# Patient Record
Sex: MALE | Race: WHITE
[De-identification: names, ages, dates, MRNs, and addresses within clinical notes are randomized per-mention and may not be internally consistent; named-entity substitution may affect disease eponyms.]

---

## 2017-02-28 NOTE — PREOP
DATE OF OPERATION:  Undetermined. 

 

DATE OF DICTATION:  02/16/2017

 

REASON FOR ADMISSION:  Soft tissue mass, right chest. 

 

BRIEF HISTORY:  This is a 51-year-old gentleman who has a painful soft tissue mass

just along the rib cage on the patient's right side.  The patient underwent an

ultrasound of this mass and it was nonspecific; however, a cyst could not be ruled

out.  The mass, on ultrasound, was approximately 7 mm in size.  Due to the pain, the

patient wished to have this removed.  

 

PAST MEDICAL HISTORY:  Significant for peptic ulcer disease, hypertension, coronary

artery disease, hypercholesterolemia, diabetes, history of blood clots and a

pulmonary embolism.  He is on lifelong Coumadin.  He has a history of thyroid issues.

 

 

PAST SURGICAL HISTORY:  Open repair of ventral hernia.  He has an excision of a cyst

in 2012 and knee surgery. 

 

ALLERGIES:  None. 

 

SOCIAL HISTORY:  The patient is a retired .  He does not smoke nor

drink. 

 

MEDICATIONS:  Metformin, atenolol, diltiazem, AcipHex and Coumadin.  

 

PHYSICAL EXAMINATION:

Lungs:  Clear. 

Heart:  Regular rhythm. 

Abdomen:  Soft, nontender, nondistended.  Patient lost approximately 90 pounds.  The

patient has a soft tissue mass along the right rib cage.  It is noted best in the

sitting position and just at the crease of the chest fat and his abdominal fat.  The

mass is approximately 1 cm in size and well demarcated.  It is located between a

chronic skin psoriatic patch posteriorly and a skin tag anteriorly.  

 

IMPRESSION/PLAN:  Soft tissue mass, right chest.  Given the fact that it caused him

discomfort, will plan for excision in the operating room.  

 

The patient, his wife and myself had approximately an hour conversation regarding the

pros and cons of stopping the Coumadin for this operation and doing a Lovenox bridge

versus doing the operation on Coumadin.  I think the risks for this kind of surgery

is higher if we put him on a Lovenox bridge, and therefore, the patient and wife have

opted to proceed with doing the surgery on Coumadin, knowing the effects of

persistent bleeding, possibly requiring transfusion and even reversal with FFP.  They

understand these risks and still want to proceed.  

 

 

BURTON DRIVER M.D.

 

[id number]

DD: 02/16/2017 15:14

DT: 02/16/2017 20:19

Job #:  [id number]

CC:  Davion Solis MD

## 2017-03-17 PROBLEM — Z00.00 ENCOUNTER FOR PREVENTIVE HEALTH EXAMINATION: Status: ACTIVE | Noted: 2017-03-17

## 2017-03-28 PROBLEM — N40.0 BPH (BENIGN PROSTATIC HYPERPLASIA): Status: RESOLVED | Noted: 2017-03-28 | Resolved: 2017-03-28

## 2017-03-29 ENCOUNTER — HOSPITAL ENCOUNTER (OUTPATIENT)
Dept: HOSPITAL 74 - FASU | Age: 52
Discharge: HOME | End: 2017-03-29
Attending: SURGERY
Payer: COMMERCIAL

## 2017-03-29 VITALS — HEART RATE: 71 BPM | TEMPERATURE: 97.9 F | SYSTOLIC BLOOD PRESSURE: 123 MMHG | DIASTOLIC BLOOD PRESSURE: 73 MMHG

## 2017-03-29 VITALS — BODY MASS INDEX: 33.7 KG/M2

## 2017-03-29 DIAGNOSIS — D21.3: Primary | ICD-10-CM

## 2017-03-29 LAB
INR BLD: 1.88 (ref 0.82–1.09)
PT PNL PPP: 20.8 SEC (ref 10.2–13)

## 2017-03-29 PROCEDURE — 0HB5XZX EXCISION OF CHEST SKIN, EXTERNAL APPROACH, DIAGNOSTIC: ICD-10-PCS | Performed by: SURGERY

## 2017-03-29 NOTE — PN
Progress Note (short form)





- Note


Progress Note: 


52 year old male has AF, with bradycardia that seems to be exacerbated post op.

  Pt with occational runs of HR dropping into 20's.  Pt says he feels fine.





Bp stable through out





Rhythm strip captured and 12 lead obtained.





Spoke with Dr Olivares pt cardiologist.


He recommended, holding Digoxin, holding atenolol for the evening and call him 

in the AM





he was ok with patient being discharged home

## 2017-03-30 ENCOUNTER — APPOINTMENT (OUTPATIENT)
Dept: HEMATOLOGY ONCOLOGY | Facility: CLINIC | Age: 52
End: 2017-03-30

## 2017-03-30 VITALS
HEART RATE: 72 BPM | OXYGEN SATURATION: 98 % | HEIGHT: 70 IN | SYSTOLIC BLOOD PRESSURE: 130 MMHG | DIASTOLIC BLOOD PRESSURE: 88 MMHG | WEIGHT: 239 LBS | BODY MASS INDEX: 34.22 KG/M2

## 2017-03-30 DIAGNOSIS — Z87.891 PERSONAL HISTORY OF NICOTINE DEPENDENCE: ICD-10-CM

## 2017-03-30 DIAGNOSIS — N40.0 BENIGN PROSTATIC HYPERPLASIA WITHOUT LOWER URINARY TRACT SYMPMS: ICD-10-CM

## 2017-03-30 DIAGNOSIS — Z83.2 FAMILY HISTORY OF DISEASES OF THE BLOOD AND BLOOD-FORMING ORGANS AND CERTAIN DISORDERS INVOLVING THE IMMUNE MECHANISM: ICD-10-CM

## 2017-03-30 DIAGNOSIS — Z82.71 FAMILY HISTORY OF POLYCYSTIC KIDNEY: ICD-10-CM

## 2017-03-30 RX ORDER — DIGOXIN 250 MCG
0.25 TABLET ORAL DAILY
Refills: 0 | Status: DISCONTINUED | COMMUNITY
End: 2017-03-29

## 2017-03-30 RX ORDER — GLIMEPIRIDE 4 MG/1
4 TABLET ORAL TWICE DAILY
Refills: 0 | Status: DISCONTINUED | COMMUNITY
End: 2017-03-30

## 2017-03-30 NOTE — OP
DATE OF OPERATION:  03/29/2017

 

PREOPERATIVE DIAGNOSIS:  Soft tissue mass right chest, suspect lipoma.

 

POSTOPERATIVE DIAGNOSIS:   Soft tissue mass right chest, suspect lipoma, 
consistent

with lipoma.

 

PROCEDURE:   Excision of right chest soft tissue mass with 4 cm wound closure. 

 

SURGEON:  Hilario Reyna MD

 

ASSISTANT: None. 

 

ANESTHESIA:  Sofi Lockett MD; MAC, 1% lidocaine without epinephrine, 
approximately 5

mL. 

 

ESTIMATED BLOOD LOSS:  Minimal.

 

SPECIMENS:  Soft tissue mass, lipoma. 

 

INDICATIONS FOR PROCEDURE:  This is a gentleman with a soft tissue mass 
overlying the

right chest that is causing him discomfort.  He wished to have this removed. 

 

DESCRIPTION OF PROCEDURE:   Patient identified and appropriately positioned on 
the

operating table.  After IV sedation, the area was prepped and draped in the 
usual

sterile fashion with ChloraPrep.  Then, 1% lidocaine without epinephrine was 
used for

anesthesia, approximately 5 mL.  A 4-cm transverse incision overlying the mass 
was

mass was made, deep to the subcutaneous tissue.  The mass was sharply excised. 

Hemostasis was achieved with electrocautery as needed.  The deep fat and dermis 
were

reapproximated with inverted 3-0 Vicryl suture. The skin was closed with 4-0 
Biosyn

followed by Dermabond.  Length of incision was 4 cm.  At the conclusion of the 
case,

sponge and needle counts were correct.  

 

ATTESTATION:  Brief operative note hand written on the preprinted form.  The 
 was

cleared prior to giving any narcotics.  

 

 

MEGAN CALDWELL [id number]

DD: 03/29/2017 13:01

DT: 03/30/2017 02:03

Job #:  [id number]



cc:  EMERSON Rebolledo

## 2017-03-30 NOTE — EKG
Test Reason : 

Blood Pressure : ***/*** mmHG

Vent. Rate : 052 BPM     Atrial Rate : 055 BPM

   P-R Int : 000 ms          QRS Dur : 088 ms

    QT Int : 432 ms       P-R-T Axes : 000 000 -08 degrees

   QTc Int : 401 ms

 

ATRIAL FIBRILLATION WITH SLOW VENTRICULAR RESPONSE

CANNOT RULE OUT INFERIOR INFARCT , AGE UNDETERMINED

ANTEROSEPTAL INFARCT , AGE UNDETERMINED

NO PREVIOUS ECGS AVAILABLE

Confirmed by MD COY MARJORY ([id number]) on 3/30/2017 4:46:14 PM

 

Referred By: Hilario Reyna           Confirmed By:DRISS COY MD

## 2017-03-31 NOTE — PATH
Surgical Pathology Report



Patient Name:  BOBY JOHNSON

Accession #:  

[facility]. Rec. #:  [id number]                                                        

   /Age/Gender:  1965 (Age: 52) / M

Account:  [id number]                                                          

             Location: [facility] AMB[facility] 

Taken:  3/29/2017

Received:  3/29/2017

Reported:  3/31/2017

Physicians:  Hilario Reyna

  



Specimen(s) Received

 SOFT TISSUE MASS RIGHT CHEST LIPOMA 





Clinical History

Right chest wall mass







Final Diagnosis

SOFT TISSUE, RIGHT CHEST, EXCISION:

LIPOMA.





***Electronically Signed***

Rafa Klein M.D.





Gross Description

Received in formalin labeled "soft tissue mass right chest lipoma," is a 3.5 x

2.7 x 1.0 cm portion of yellow, lobulated adipose tissue. Sectioning reveals

homogeneous yellow, smooth fat. Representative sections are submitted in one

cassette.

/3/30/2017



[facility]/3/30/2017

## 2017-04-07 RX ORDER — WARFARIN 5 MG/1
5 TABLET ORAL
Refills: 0 | Status: DISCONTINUED | COMMUNITY
End: 2017-04-07

## 2017-06-14 ENCOUNTER — OTHER (OUTPATIENT)
Age: 52
End: 2017-06-14

## 2017-06-21 ENCOUNTER — APPOINTMENT (OUTPATIENT)
Dept: HEMATOLOGY ONCOLOGY | Facility: CLINIC | Age: 52
End: 2017-06-21

## 2017-07-18 ENCOUNTER — APPOINTMENT (OUTPATIENT)
Dept: HEMATOLOGY ONCOLOGY | Facility: CLINIC | Age: 52
End: 2017-07-18

## 2017-07-18 VITALS
HEART RATE: 78 BPM | SYSTOLIC BLOOD PRESSURE: 122 MMHG | BODY MASS INDEX: 35.07 KG/M2 | OXYGEN SATURATION: 98 % | HEIGHT: 70 IN | WEIGHT: 245 LBS | RESPIRATION RATE: 16 BRPM | DIASTOLIC BLOOD PRESSURE: 76 MMHG | TEMPERATURE: 98.7 F

## 2017-07-18 RX ORDER — ALLOPURINOL 100 MG/1
100 TABLET ORAL DAILY
Refills: 0 | Status: DISCONTINUED | COMMUNITY
End: 2017-07-18

## 2017-10-05 ENCOUNTER — OTHER (OUTPATIENT)
Age: 52
End: 2017-10-05

## 2018-01-02 ENCOUNTER — RX RENEWAL (OUTPATIENT)
Age: 53
End: 2018-01-02

## 2018-01-03 ENCOUNTER — RX RENEWAL (OUTPATIENT)
Age: 53
End: 2018-01-03

## 2018-01-22 ENCOUNTER — APPOINTMENT (OUTPATIENT)
Dept: HEMATOLOGY ONCOLOGY | Facility: CLINIC | Age: 53
End: 2018-01-22
Payer: MEDICARE

## 2018-01-22 VITALS
WEIGHT: 281 LBS | OXYGEN SATURATION: 97 % | DIASTOLIC BLOOD PRESSURE: 75 MMHG | HEIGHT: 70 IN | SYSTOLIC BLOOD PRESSURE: 117 MMHG | HEART RATE: 82 BPM | TEMPERATURE: 99.2 F | BODY MASS INDEX: 40.23 KG/M2 | RESPIRATION RATE: 16 BRPM

## 2018-01-22 PROCEDURE — 99214 OFFICE O/P EST MOD 30 MIN: CPT

## 2018-02-22 ENCOUNTER — APPOINTMENT (OUTPATIENT)
Dept: HEMATOLOGY ONCOLOGY | Facility: CLINIC | Age: 53
End: 2018-02-22

## 2018-03-08 ENCOUNTER — APPOINTMENT (OUTPATIENT)
Dept: HEMATOLOGY ONCOLOGY | Facility: CLINIC | Age: 53
End: 2018-03-08

## 2018-06-04 ENCOUNTER — OTHER (OUTPATIENT)
Age: 53
End: 2018-06-04

## 2018-06-11 ENCOUNTER — APPOINTMENT (OUTPATIENT)
Dept: HEMATOLOGY ONCOLOGY | Facility: CLINIC | Age: 53
End: 2018-06-11

## 2018-06-15 ENCOUNTER — OTHER (OUTPATIENT)
Age: 53
End: 2018-06-15

## 2018-07-17 ENCOUNTER — APPOINTMENT (OUTPATIENT)
Dept: HEMATOLOGY ONCOLOGY | Facility: CLINIC | Age: 53
End: 2018-07-17

## 2018-12-24 ENCOUNTER — RECORD ABSTRACTING (OUTPATIENT)
Age: 53
End: 2018-12-24

## 2018-12-24 DIAGNOSIS — Z87.19 PERSONAL HISTORY OF OTHER DISEASES OF THE DIGESTIVE SYSTEM: ICD-10-CM

## 2018-12-24 DIAGNOSIS — Z86.010 PERSONAL HISTORY OF COLONIC POLYPS: ICD-10-CM

## 2018-12-24 DIAGNOSIS — Z82.49 FAMILY HISTORY OF ISCHEMIC HEART DISEASE AND OTHER DISEASES OF THE CIRCULATORY SYSTEM: ICD-10-CM

## 2018-12-24 DIAGNOSIS — Z84.1 FAMILY HISTORY OF DISORDERS OF KIDNEY AND URETER: ICD-10-CM

## 2019-01-10 ENCOUNTER — RX RENEWAL (OUTPATIENT)
Age: 54
End: 2019-01-10

## 2019-01-23 ENCOUNTER — RX RENEWAL (OUTPATIENT)
Age: 54
End: 2019-01-23

## 2019-02-04 ENCOUNTER — RX RENEWAL (OUTPATIENT)
Age: 54
End: 2019-02-04

## 2019-02-13 ENCOUNTER — RESULT REVIEW (OUTPATIENT)
Age: 54
End: 2019-02-13

## 2019-03-12 ENCOUNTER — APPOINTMENT (OUTPATIENT)
Dept: ENDOCRINOLOGY | Facility: CLINIC | Age: 54
End: 2019-03-12

## 2019-03-13 ENCOUNTER — RX RENEWAL (OUTPATIENT)
Age: 54
End: 2019-03-13

## 2019-03-18 ENCOUNTER — RX RENEWAL (OUTPATIENT)
Age: 54
End: 2019-03-18

## 2019-03-18 RX ORDER — ENOXAPARIN SODIUM 150 MG/ML
150 INJECTION SUBCUTANEOUS DAILY
Qty: 3 | Refills: 0 | Status: DISCONTINUED | COMMUNITY
Start: 2017-08-15 | End: 2019-03-18

## 2019-03-18 RX ORDER — ENOXAPARIN SODIUM 100 MG/ML
40 INJECTION SUBCUTANEOUS DAILY
Qty: 2 | Refills: 0 | Status: DISCONTINUED | COMMUNITY
Start: 2017-08-15 | End: 2019-03-18

## 2019-03-19 ENCOUNTER — APPOINTMENT (OUTPATIENT)
Dept: HEMATOLOGY ONCOLOGY | Facility: CLINIC | Age: 54
End: 2019-03-19
Payer: MEDICARE

## 2019-03-19 ENCOUNTER — OTHER (OUTPATIENT)
Age: 54
End: 2019-03-19

## 2019-03-19 VITALS
OXYGEN SATURATION: 97 % | WEIGHT: 315 LBS | BODY MASS INDEX: 45.1 KG/M2 | DIASTOLIC BLOOD PRESSURE: 94 MMHG | RESPIRATION RATE: 20 BRPM | SYSTOLIC BLOOD PRESSURE: 146 MMHG | TEMPERATURE: 98.7 F | HEART RATE: 91 BPM | HEIGHT: 70 IN

## 2019-03-19 PROCEDURE — 99214 OFFICE O/P EST MOD 30 MIN: CPT

## 2019-03-19 NOTE — HISTORY OF PRESENT ILLNESS
[de-identified] : Patient is a 52 year old male being seen for history of PE. He was diagnosed with PE 1/2010,  placed on enoxaparin transitioned to warfarin. PE felt to be unprovoked and hypercoag work up was negative for prothrombin and factor V mutation and antiphospholipid syndrome.  Ferritin level was also low.  Mr Blackwood was morbidly obese in 2010 weighing 350lbs.  He has lost over 100lbs in last 6 months. He has a history of HTN, atrial fibrillation and diabetes.  He has since been able to come off several medications due to weight loss.  He continues on warfarin with no bleeding or bruising and his INR is stable.  [de-identified] : He is s/p inguinal hernia repair about two weeks ago which was done laparoscopically.. His surgery was delayed because of bout with sciatica causing sever back pain. He is scheduled for ventral hernia in September. he used Lovenox as a bridge and restarted Pradaxa.  He denies bleeding, bruising, SOB or leg discomfort.

## 2019-03-19 NOTE — ASSESSMENT
[FreeTextEntry1] : Patient with h/o unprovoked pulmonary embolism 2010 followed by chronic atrial fibrillation 2011 on chronic anticoagulation. \par Patient changed 2017 to dabigatran from warfarin - tolerates well. \par \par Plan for hernia repair - May 2019\par S/p 3 falls, mechanical - PT ordered for gait training \par Continue dabigatran \par Repeat iron parameters - previously reported ferritin 17. \par GI work up - colonoscopy - 2013, EGD 6/16\par \par D/w patient - gained 80 lbs since 2017\par \par

## 2019-03-19 NOTE — PHYSICAL EXAM
[Fully active, able to carry on all pre-disease performance without restriction] : Status 0 - Fully active, able to carry on all pre-disease performance without restriction [Normal] : affect appropriate [Obese] : obese [de-identified] : trace edema, varicose veins [de-identified] : right flank - lipoma removed, no bleeding

## 2019-03-19 NOTE — REASON FOR VISIT
[Follow-Up Visit] : a follow-up visit for [Spouse] : spouse [Other: _____] : [unfilled] [FreeTextEntry2] : Pulmonary embolism, AFib, chronic anticoagulation

## 2019-03-19 NOTE — REVIEW OF SYSTEMS
[Negative] : Allergic/Immunologic [Fever] : no fever [Recent Change In Weight] : ~T no recent weight change [Eye Pain] : no eye pain [Vision Problems] : no vision problems [Dysphagia] : no dysphagia [Hoarseness] : no hoarseness [Chest Pain] : no chest pain [Palpitations] : no palpitations [Lower Ext Edema] : no lower extremity edema [Shortness Of Breath] : no shortness of breath [Cough] : no cough [Abdominal Pain] : no abdominal pain [Constipation] : no constipation [Diarrhea] : no diarrhea [Dysuria] : no dysuria [Joint Pain] : no joint pain [Muscle Pain] : no muscle pain [Skin Rash] : no skin rash [Dizziness] : no dizziness [Insomnia] : no insomnia [Anxiety] : no anxiety [Depression] : no depression [Hot Flashes] : no hot flashes [Muscle Weakness] : no muscle weakness [Easy Bleeding] : no tendency for easy bleeding [Easy Bruising] : no tendency for easy bruising

## 2019-03-19 NOTE — CONSULT LETTER
[Dear  ___] : Dear  [unfilled], [Consult Letter:] : I had the pleasure of evaluating your patient, [unfilled]. [Please see my note below.] : Please see my note below. [Consult Closing:] : Thank you very much for allowing me to participate in the care of this patient.  If you have any questions, please do not hesitate to contact me. [Sincerely,] : Sincerely, [DrPerez  ___] : Dr. PINEDA [DrPerez ___] : Dr. PINEDA [FreeTextEntry3] : Ariana Gibson MD\par [facility] [place] [facility]\par

## 2019-03-19 NOTE — DISCUSSION/SUMMARY
[FreeTextEntry1] : 1.  Plan for hernia repair- Dr. Loera\par - stop Pradaxa 9/3/17 in am\par - anticoagulation Lovenox 150 mg SQ q 24hr for 3 dayy with last dose 24hr before procedure\par - start Lovenox 40 mg SQ 24 hr after the surgery x 2 days \par - resume paradaxa on 9/9/17\par \par 2. Iron deficiency anemia \par - Dr. Nieto - GI work up, obtain results\par - start oral iron. ferrous gluconate

## 2019-03-27 ENCOUNTER — RX RENEWAL (OUTPATIENT)
Age: 54
End: 2019-03-27

## 2019-04-29 ENCOUNTER — APPOINTMENT (OUTPATIENT)
Dept: ENDOCRINOLOGY | Facility: CLINIC | Age: 54
End: 2019-04-29
Payer: MEDICARE

## 2019-04-29 VITALS
HEART RATE: 88 BPM | BODY MASS INDEX: 45.1 KG/M2 | WEIGHT: 315 LBS | SYSTOLIC BLOOD PRESSURE: 136 MMHG | HEIGHT: 70 IN | DIASTOLIC BLOOD PRESSURE: 90 MMHG

## 2019-04-29 PROCEDURE — 99215 OFFICE O/P EST HI 40 MIN: CPT

## 2019-04-29 RX ORDER — ROSUVASTATIN CALCIUM 5 MG/1
5 TABLET, FILM COATED ORAL
Refills: 0 | Status: DISCONTINUED | COMMUNITY
End: 2019-04-29

## 2019-04-29 RX ORDER — METHYLPREDNISOLONE 4 MG/1
4 TABLET ORAL
Refills: 0 | Status: DISCONTINUED | COMMUNITY
End: 2019-04-29

## 2019-04-29 RX ORDER — PEN NEEDLE, DIABETIC 29 G X1/2"
31G X 8 MM NEEDLE, DISPOSABLE MISCELLANEOUS
Qty: 120 | Refills: 5 | Status: ACTIVE | COMMUNITY
Start: 2019-04-29 | End: 1900-01-01

## 2019-04-29 RX ORDER — METFORMIN HYDROCHLORIDE 1000 MG/1
1000 TABLET, COATED ORAL
Refills: 0 | Status: DISCONTINUED | COMMUNITY
End: 2019-04-29

## 2019-04-29 RX ORDER — HYDROXYCHLOROQUINE SULFATE 200 MG/1
200 TABLET, FILM COATED ORAL
Refills: 0 | Status: DISCONTINUED | COMMUNITY
End: 2019-04-29

## 2019-04-29 RX ORDER — TADALAFIL 5 MG/1
5 TABLET, FILM COATED ORAL
Refills: 0 | Status: DISCONTINUED | COMMUNITY
End: 2019-04-29

## 2019-04-29 NOTE — HISTORY OF PRESENT ILLNESS
[FreeTextEntry1] : ELMER is here for diabetes type 2 follow up. He is on  no medications \par A1c is 10.1 % and has hyperglycemia, recently completely steroid course for URI \par has been getting steroids for last few mths for one reason or another\par A1c prior to this was 6.5 % \par REFUSES TO BE ON INSULIN \par wants to try diet and exercise  \par no BG log \par Recent thyroid USG now negative for nodules \par TSH was borderline high \par

## 2019-04-29 NOTE — REVIEW OF SYSTEMS
[All other systems negative] : All other systems negative [FreeTextEntry1] : Review of system intake sheet reviewed, signed and scanned in the EMR

## 2019-04-29 NOTE — HISTORY OF PRESENT ILLNESS
[FreeTextEntry1] : ELMRE is here for diabetes type 2 follow up. He is on  no medications \par A1c is 10.1 % and has hyperglycemia, recently completely steroid course for URI \par has been getting steroids for last few mths for one reason or another\par A1c prior to this was 6.5 % \par REFUSES TO BE ON INSULIN \par wants to try diet and exercise  \par no BG log \par Recent thyroid USG now negative for nodules \par TSH was borderline high \par

## 2019-05-01 ENCOUNTER — RX RENEWAL (OUTPATIENT)
Age: 54
End: 2019-05-01

## 2019-05-02 ENCOUNTER — RX RENEWAL (OUTPATIENT)
Age: 54
End: 2019-05-02

## 2019-05-02 RX ORDER — BLOOD SUGAR DIAGNOSTIC
STRIP MISCELLANEOUS TWICE DAILY
Qty: 120 | Refills: 2 | Status: DISCONTINUED | COMMUNITY
Start: 2019-05-01 | End: 2019-05-02

## 2019-05-20 ENCOUNTER — RX RENEWAL (OUTPATIENT)
Age: 54
End: 2019-05-20

## 2019-05-24 ENCOUNTER — RX RENEWAL (OUTPATIENT)
Age: 54
End: 2019-05-24

## 2019-05-28 ENCOUNTER — RX RENEWAL (OUTPATIENT)
Age: 54
End: 2019-05-28

## 2019-05-29 ENCOUNTER — RX CHANGE (OUTPATIENT)
Age: 54
End: 2019-05-29

## 2019-05-29 RX ORDER — OLMESARTAN MEDOXOMIL 20 MG/1
20 TABLET, FILM COATED ORAL
Refills: 0 | Status: DISCONTINUED | COMMUNITY
End: 2019-05-29

## 2019-05-30 ENCOUNTER — RX CHANGE (OUTPATIENT)
Age: 54
End: 2019-05-30

## 2019-05-30 RX ORDER — OLMESARTAN MEDOXOMIL 40 MG/1
40 TABLET, FILM COATED ORAL DAILY
Qty: 90 | Refills: 3 | Status: DISCONTINUED | COMMUNITY
Start: 1900-01-01 | End: 2019-05-30

## 2019-05-30 RX ORDER — IRBESARTAN 150 MG/1
150 TABLET ORAL DAILY
Qty: 90 | Refills: 3 | Status: DISCONTINUED | COMMUNITY
Start: 2019-05-29 | End: 2019-05-30

## 2019-05-31 ENCOUNTER — RECORD ABSTRACTING (OUTPATIENT)
Age: 54
End: 2019-05-31

## 2019-05-31 DIAGNOSIS — Z82.49 FAMILY HISTORY OF ISCHEMIC HEART DISEASE AND OTHER DISEASES OF THE CIRCULATORY SYSTEM: ICD-10-CM

## 2019-06-03 ENCOUNTER — APPOINTMENT (OUTPATIENT)
Dept: ENDOCRINOLOGY | Facility: CLINIC | Age: 54
End: 2019-06-03
Payer: MEDICARE

## 2019-06-03 VITALS
BODY MASS INDEX: 45.1 KG/M2 | SYSTOLIC BLOOD PRESSURE: 138 MMHG | HEIGHT: 70 IN | WEIGHT: 315 LBS | DIASTOLIC BLOOD PRESSURE: 92 MMHG | HEART RATE: 90 BPM

## 2019-06-03 LAB — GLUCOSE BLDC GLUCOMTR-MCNC: 220

## 2019-06-03 PROCEDURE — 82962 GLUCOSE BLOOD TEST: CPT

## 2019-06-03 PROCEDURE — 99215 OFFICE O/P EST HI 40 MIN: CPT | Mod: 25

## 2019-06-03 RX ORDER — DILTIAZEM HYDROCHLORIDE 240 MG/1
240 CAPSULE, COATED, EXTENDED RELEASE ORAL
Refills: 0 | Status: DISCONTINUED | COMMUNITY
End: 2019-06-03

## 2019-06-03 NOTE — REVIEW OF SYSTEMS
[Fatigue] : fatigue [Recent Weight Gain (___ Lbs)] : recent [unfilled] ~Ulb weight gain [Nausea] : nausea [Joint Pain] : joint pain [Back Pain] : back pain [Headache] : headaches [Insomnia] : insomnia [Polydipsia] : polydipsia [Negative] : Heme/Lymph [FreeTextEntry7] : passing kidney stones  [FreeTextEntry8] : hemataurelioa

## 2019-06-03 NOTE — HISTORY OF PRESENT ILLNESS
[FreeTextEntry1] :    55 yo WM pt of Dr Metcalf coming for f/u for uncontrolled DM2, hypothyroidism gained his all wait back \par noncompliant last seen 2017 then seen Dr Butler\par        lost 68lb since on MediFast diet, gained 11 in the last 6 months, still doing well, going to the gym daily, swimming then gained all back, has neck pain, now passing kidney stones\par tried Victoza with Dr butler had side effects \par        labs reviewed, now thyroid low , A1c 10 \par        off all his diabetic meds\par        BP meds were adjusted by his PCP , will have hernia repair 12/17 also will see a vascular surgeon per pt his PCP advised to to pains in his legs while sitting \par        did not do US thyroid as advised, last US done 11/16 showed no nodules, so no biopsy was done by Dr Leach\par        body hair groing back, feels "great " per pt \par        started Medifast beginning of November 2016 , "Take shape for Life" \par        stopped insulin all togheter \par        type 2 DM for 10 years 2005 w/o known complications \par        STOPPED Glimepiride 4mg half a tab in am only restarted a week ago \par        4/4/17 STOPPED Metformin 1g bid \par        STOPPED Lantus 30units qhs \par        STOPPED Humalog 18-22......22.....22 per SS\par        forgets about 3 times a week to take his Humalog before his meals \par         Humalog sliding scale before each meal\par        for sugar under 80 do not take it\par         take 18 for breakfast....16 for lunch and 18 for supper\par        131-180 take 20units breakfast....18units for lunch....20units supper\par        181-230 take 22units for breakfast........20units for lunch and 22units for supper\par        231-280 take 24units breakfast........22 for lunch.......24units for supper etc\par        used to be on Byetta , stopped as his sugars were low per pt , Bydureon gave him lumps stooped July 2015\par         HgA1c was 8.8% 4/21/15, 7/15 was 9.0%, HgA1c 10.9% on 11/15, had one with Dr Metcalf a month ago around 8% per pt , 2/'16 HgA1c 8.4%, 5/16 was 8.5%\par        Checks BS none\par        lowest 86\par        has problems checking sugars , has a hard time getting blood put of his fingers \par        Microvascular complications: \par        Nephropathy +microal/cr 83 (<30), repeated 83, EGFR 105 11/4/15\par        Neuropathy symptoms, denies \par        microfilament exam normal today \par        Retinopathy denies last eye exam 1/17 ago Dr Thomas \par        Macrovascular complications: \par        HTN at goal on present meds on Benicar\par        CAD/CVA denies\par        Lipids not at goal , was 105 , TG , 136, were 209 was 229 on with LFT's elevated AST 58, ALT 39, was 51 (<46) used to be on Crestor 5mg qd \par        TSH 10.24, FT4 1.3 on dose was increased from 225 to 250mcg since July 2015 , does not forget \par        takes it at 2am then goes back to sleep\par        11/4/15 TSH 8\par        2/16 TSH 0.88\par        5/16 TSH 1.8\par        CBC mild anemia Hg 12.8 stable \par        had hair loss patchy from his legs , seen Dermatholgy was told likely due to thyroid per pt , now resolved \par        also has crhonic pretibial hyperpigmentation stasis.\par eye exam Dr Thomas 11/18 no DR per pt

## 2019-06-03 NOTE — PHYSICAL EXAM
[Alert] : alert [No Acute Distress] : no acute distress [Well Nourished] : well nourished [Normal Sclera/Conjunctiva] : normal sclera/conjunctiva [Well Developed] : well developed [EOMI] : extra ocular movement intact [No Proptosis] : no proptosis [Thyroid Not Enlarged] : the thyroid was not enlarged [Normal Oropharynx] : the oropharynx was normal [No Thyroid Nodules] : there were no palpable thyroid nodules [No Respiratory Distress] : no respiratory distress [No Accessory Muscle Use] : no accessory muscle use [Clear to Auscultation] : lungs were clear to auscultation bilaterally [Normal Rate] : heart rate was normal  [Normal S1, S2] : normal S1 and S2 [Regular Rhythm] : with a regular rhythm [Pedal Pulses Normal] : the pedal pulses are present [No Edema] : there was no peripheral edema [Normal Bowel Sounds] : normal bowel sounds [Not Tender] : non-tender [Not Distended] : not distended [Soft] : abdomen soft [Post Cervical Nodes] : posterior cervical nodes [Anterior Cervical Nodes] : anterior cervical nodes [Axillary Nodes] : axillary nodes [Normal] : normal and non tender [No Spinal Tenderness] : no spinal tenderness [Spine Straight] : spine straight [No Stigmata of Cushings Syndrome] : no stigmata of cushings syndrome [Normal Gait] : normal gait [Normal Strength/Tone] : muscle strength and tone were normal [No Rash] : no rash [Left Foot Was Examined] : left foot ~C was examined [Right Foot Was Examined] : right foot ~C was examined [2+] : 2+ in the dorsalis pedis [No Tremors] : no tremors [Normal Reflexes] : deep tendon reflexes were 2+ and symmetric [Oriented x3] : oriented to person, place, and time [Normal Sensation on Monofilament Testing] : normal sensation on monofilament testing of lower extremities [Acanthosis Nigricans] : no acanthosis nigricans [Vibration Dec.] : normal vibratory sensation at the level of the toes [Position Sense Dec.] : normal position sense at the level of the toes [Diminished Throughout Both Feet] : normal tactile sensation with monofilament testing throughout both feet

## 2019-06-18 ENCOUNTER — RX RENEWAL (OUTPATIENT)
Age: 54
End: 2019-06-18

## 2019-07-09 ENCOUNTER — MEDICATION RENEWAL (OUTPATIENT)
Age: 54
End: 2019-07-09

## 2019-07-23 ENCOUNTER — RX RENEWAL (OUTPATIENT)
Age: 54
End: 2019-07-23

## 2019-07-26 RX ORDER — LEVOTHYROXINE SODIUM 0.17 MG/1
175 TABLET ORAL
Qty: 90 | Refills: 0 | Status: DISCONTINUED | COMMUNITY
Start: 2019-07-23 | End: 2019-07-26

## 2019-08-05 ENCOUNTER — RX RENEWAL (OUTPATIENT)
Age: 54
End: 2019-08-05

## 2019-08-30 ENCOUNTER — RX CHANGE (OUTPATIENT)
Age: 54
End: 2019-08-30

## 2019-08-30 RX ORDER — TELMISARTAN 40 MG/1
40 TABLET ORAL DAILY
Qty: 90 | Refills: 2 | Status: DISCONTINUED | COMMUNITY
Start: 2019-05-30 | End: 2019-08-30

## 2019-09-17 ENCOUNTER — APPOINTMENT (OUTPATIENT)
Dept: HEMATOLOGY ONCOLOGY | Facility: CLINIC | Age: 54
End: 2019-09-17

## 2019-09-19 ENCOUNTER — MEDICATION RENEWAL (OUTPATIENT)
Age: 54
End: 2019-09-19

## 2019-10-07 ENCOUNTER — RX CHANGE (OUTPATIENT)
Age: 54
End: 2019-10-07

## 2019-10-07 ENCOUNTER — APPOINTMENT (OUTPATIENT)
Dept: ENDOCRINOLOGY | Facility: CLINIC | Age: 54
End: 2019-10-07
Payer: MEDICARE

## 2019-10-07 VITALS
HEART RATE: 90 BPM | OXYGEN SATURATION: 97 % | BODY MASS INDEX: 45.1 KG/M2 | RESPIRATION RATE: 16 BRPM | WEIGHT: 315 LBS | HEIGHT: 70 IN | SYSTOLIC BLOOD PRESSURE: 130 MMHG | DIASTOLIC BLOOD PRESSURE: 80 MMHG

## 2019-10-07 LAB — GLUCOSE BLDC GLUCOMTR-MCNC: 139

## 2019-10-07 PROCEDURE — G0447 BEHAVIOR COUNSEL OBESITY 15M: CPT | Mod: 59

## 2019-10-07 PROCEDURE — 82962 GLUCOSE BLOOD TEST: CPT

## 2019-10-07 PROCEDURE — 99215 OFFICE O/P EST HI 40 MIN: CPT | Mod: 25

## 2019-10-07 RX ORDER — RANITIDINE 300 MG/1
300 TABLET ORAL
Refills: 0 | Status: DISCONTINUED | COMMUNITY
Start: 2017-12-07 | End: 2019-10-07

## 2019-10-07 NOTE — REVIEW OF SYSTEMS
[Fatigue] : fatigue [Nausea] : nausea [Recent Weight Gain (___ Lbs)] : recent [unfilled] ~Ulb weight gain [Joint Pain] : joint pain [Back Pain] : back pain [Headache] : headaches [Insomnia] : insomnia [Polydipsia] : polydipsia [Negative] : Endocrine [FreeTextEntry8] : hemataurelioa  [FreeTextEntry7] : passing kidney stones

## 2019-10-07 NOTE — HISTORY OF PRESENT ILLNESS
[FreeTextEntry1] :    53 yo WM pt of Dr Metcalf coming for f/u for uncontrolled DM2, hypothyroidism gained his all wait back \par \par        lost 68lb since on MediFast diet, gained 11 in the last 6 months, still doing well, going to the gym daily, swimming then gained all back, has neck pain, now passing kidney stones\par tried Victoza with Dr Butler had side effects \par        labs reviewed, now thyroid low , A1c 10 \par        tried Tresiba, was having shortness of breath at night and eczema , stopped 5 days ago \par sugars are better per pt he eats better \par        BP meds were adjusted by his PCP , will have hernia repair 12/17 also will see a vascular surgeon per pt his PCP advised to to pains in his legs while sitting \par        did not do US thyroid as advised, last US done 11/16 showed no nodules, so no biopsy was done by Dr Leach\par        body hair groing back, feels "great " per pt \par        started Medifast beginning of November 2016 , "Take shape for Life" \par        stopped insulin all togheter \par        type 2 DM for 10 years 2005 w/o known complications \par        STOPPED Glimepiride 4mg half a tab in am only restarted a week ago \par        4/4/17 STOPPED Metformin 1g bid \par        STOPPED Lantus 30units qhs \par        STOPPED Humalog 18-22......22.....22 per SS\par        forgets about 3 times a week to take his Humalog before his meals \par         Humalog sliding scale before each meal\par        for sugar under 80 do not take it\par         take 18 for breakfast....16 for lunch and 18 for supper\par        131-180 take 20units breakfast....18units for lunch....20units supper\par        181-230 take 22units for breakfast........20units for lunch and 22units for supper\par        231-280 take 24units breakfast........22 for lunch.......24units for supper etc\par        used to be on Byetta , stopped as his sugars were low per pt , Bydureon gave him lumps stooped July 2015\par         HgA1c was 8.8% 4/21/15, 7/15 was 9.0%, HgA1c 10.9% on 11/15, had one with Dr Metcalf a month ago around 8% per pt , 2/'16 HgA1c 8.4%, 5/16 was 8.5%\par        Checks BS none\par        lowest 86\par        has problems checking sugars , has a hard time getting blood put of his fingers \par        Microvascular complications: \par        Nephropathy +microal/cr 83 (<30), repeated 83, EGFR 105 11/4/15\par        Neuropathy symptoms, denies \par        microfilament exam normal today \par        Retinopathy denies last eye exam 1/17 ago Dr Thomas \par        Macrovascular complications: \par        HTN at goal on present meds on Benicar\par        CAD/CVA denies\par        Lipids not at goal , was 105 , TG , 136, were 209 was 229 on with LFT's elevated AST 58, ALT 39, was 51 (<46) used to be on Crestor 5mg qd \par        TSH 10.24, FT4 1.3 on dose was increased from 225 to 250mcg since July 2015 , does not forget \par        takes it at 2am then goes back to sleep\par        11/4/15 TSH 8\par        2/16 TSH 0.88\par        5/16 TSH 1.8\par        CBC mild anemia Hg 12.8 stable \par        had hair loss patchy from his legs , seen Dermatholgy was told likely due to thyroid per pt , now resolved \par        also has crhonic pretibial hyperpigmentation stasis.\par eye exam Dr Thomas 11/18 no DR per pt

## 2019-10-07 NOTE — PHYSICAL EXAM
[Alert] : alert [No Acute Distress] : no acute distress [Well Nourished] : well nourished [Normal Sclera/Conjunctiva] : normal sclera/conjunctiva [Well Developed] : well developed [EOMI] : extra ocular movement intact [No Proptosis] : no proptosis [Normal Oropharynx] : the oropharynx was normal [No Thyroid Nodules] : there were no palpable thyroid nodules [Thyroid Not Enlarged] : the thyroid was not enlarged [No Respiratory Distress] : no respiratory distress [No Accessory Muscle Use] : no accessory muscle use [Clear to Auscultation] : lungs were clear to auscultation bilaterally [Normal S1, S2] : normal S1 and S2 [Normal Rate] : heart rate was normal  [Regular Rhythm] : with a regular rhythm [Pedal Pulses Normal] : the pedal pulses are present [No Edema] : there was no peripheral edema [Normal Bowel Sounds] : normal bowel sounds [Not Tender] : non-tender [Not Distended] : not distended [Soft] : abdomen soft [Post Cervical Nodes] : posterior cervical nodes [Anterior Cervical Nodes] : anterior cervical nodes [Axillary Nodes] : axillary nodes [Normal] : normal and non tender [No Spinal Tenderness] : no spinal tenderness [Spine Straight] : spine straight [No Stigmata of Cushings Syndrome] : no stigmata of cushings syndrome [Normal Gait] : normal gait [Normal Strength/Tone] : muscle strength and tone were normal [No Rash] : no rash [Acanthosis Nigricans] : no acanthosis nigricans [Right Foot Was Examined] : right foot ~C was examined [Left Foot Was Examined] : left foot ~C was examined [2+] : 2+ in the dorsalis pedis [Vibration Dec.] : normal vibratory sensation at the level of the toes [Position Sense Dec.] : normal position sense at the level of the toes [Diminished Throughout Both Feet] : normal tactile sensation with monofilament testing throughout both feet [Normal Reflexes] : deep tendon reflexes were 2+ and symmetric [Normal Sensation on Monofilament Testing] : normal sensation on monofilament testing of lower extremities [No Tremors] : no tremors [Oriented x3] : oriented to person, place, and time

## 2019-10-14 ENCOUNTER — MEDICATION RENEWAL (OUTPATIENT)
Age: 54
End: 2019-10-14

## 2019-10-16 ENCOUNTER — APPOINTMENT (OUTPATIENT)
Dept: HEMATOLOGY ONCOLOGY | Facility: CLINIC | Age: 54
End: 2019-10-16

## 2019-10-17 ENCOUNTER — MEDICATION RENEWAL (OUTPATIENT)
Age: 54
End: 2019-10-17

## 2019-10-17 RX ORDER — FLASH GLUCOSE SENSOR
KIT MISCELLANEOUS
Qty: 1 | Refills: 0 | Status: ACTIVE | COMMUNITY
Start: 2019-10-17 | End: 1900-01-01

## 2019-11-01 ENCOUNTER — RX RENEWAL (OUTPATIENT)
Age: 54
End: 2019-11-01

## 2019-11-19 ENCOUNTER — MEDICATION RENEWAL (OUTPATIENT)
Age: 54
End: 2019-11-19

## 2019-11-29 ENCOUNTER — CLINICAL ADVICE (OUTPATIENT)
Age: 54
End: 2019-11-29

## 2019-12-03 ENCOUNTER — RX RENEWAL (OUTPATIENT)
Age: 54
End: 2019-12-03

## 2019-12-09 ENCOUNTER — APPOINTMENT (OUTPATIENT)
Dept: HEMATOLOGY ONCOLOGY | Facility: CLINIC | Age: 54
End: 2019-12-09
Payer: MEDICARE

## 2019-12-11 ENCOUNTER — APPOINTMENT (OUTPATIENT)
Dept: INTERNAL MEDICINE | Facility: CLINIC | Age: 54
End: 2019-12-11
Payer: MEDICARE

## 2019-12-11 VITALS
HEIGHT: 70 IN | DIASTOLIC BLOOD PRESSURE: 90 MMHG | OXYGEN SATURATION: 97 % | HEART RATE: 92 BPM | BODY MASS INDEX: 45.1 KG/M2 | WEIGHT: 315 LBS | SYSTOLIC BLOOD PRESSURE: 130 MMHG

## 2019-12-11 PROCEDURE — 99204 OFFICE O/P NEW MOD 45 MIN: CPT

## 2019-12-11 NOTE — ASSESSMENT
[FreeTextEntry1] : I suspect dyspnea is due to morbid obesity and recent weight gain and to have the history of shortness of breath mainly at rest but not with activity. I will a complete pulmonary function test. I also suspect a suspect this patient has significant sleep apnea. This is based on the prior history and the fact that he has gained back most of all the weight that he lost. I will order a home sleep study for confirmation. Patient should be treated with CPAP if sleep apnea is documented.

## 2019-12-11 NOTE — PHYSICAL EXAM
[No Acute Distress] : no acute distress [Well Developed] : well developed [Well-Appearing] : well-appearing [PERRL] : pupils equal round and reactive to light [Normal Sclera/Conjunctiva] : normal sclera/conjunctiva [EOMI] : extraocular movements intact [Normal Outer Ear/Nose] : the outer ears and nose were normal in appearance [No Lymphadenopathy] : no lymphadenopathy [No JVD] : no jugular venous distention [Normal Oropharynx] : the oropharynx was normal [Supple] : supple [No Respiratory Distress] : no respiratory distress  [Thyroid Normal, No Nodules] : the thyroid was normal and there were no nodules present [No Accessory Muscle Use] : no accessory muscle use [Clear to Auscultation] : lungs were clear to auscultation bilaterally [Normal Rate] : normal rate  [Normal S1, S2] : normal S1 and S2 [No Murmur] : no murmur heard [No Abdominal Bruit] : a ~M bruit was not heard ~T in the abdomen [No Carotid Bruits] : no carotid bruits [No Varicosities] : no varicosities [No Edema] : there was no peripheral edema [Pedal Pulses Present] : the pedal pulses are present [No Extremity Clubbing/Cyanosis] : no extremity clubbing/cyanosis [Soft] : abdomen soft [No Palpable Aorta] : no palpable aorta [Non-distended] : non-distended [Non Tender] : non-tender [No Masses] : no abdominal mass palpated [No HSM] : no HSM [Normal Bowel Sounds] : normal bowel sounds [Normal Posterior Cervical Nodes] : no posterior cervical lymphadenopathy [Normal Anterior Cervical Nodes] : no anterior cervical lymphadenopathy [No CVA Tenderness] : no CVA  tenderness [No Joint Swelling] : no joint swelling [No Spinal Tenderness] : no spinal tenderness [Grossly Normal Strength/Tone] : grossly normal strength/tone [No Focal Deficits] : no focal deficits [No Rash] : no rash [Coordination Grossly Intact] : coordination grossly intact [Normal Affect] : the affect was normal [Normal Gait] : normal gait [Normal Insight/Judgement] : insight and judgment were intact [de-identified] : morbid obesity [de-identified] : irreg irreg

## 2019-12-11 NOTE — HISTORY OF PRESENT ILLNESS
[FreeTextEntry1] : Evaluation for dyspnea. [de-identified] : This is a 54-year-old male formerly smoked one to 2 packs per day but quit 30 years ago. Over the past 3-4 weeks he complains of shortness of breath. He also has an occasional cough. He had a chest x-ray done on 11-29 which revealed a questionable infiltrate at the right base and he was treated with a course of an antibiotic for 10 days. He continues to complain of shortness of breath more when he is bending over and shortness of breath lying flat. Of note he can walk okay and he is still able to go spinning classes without undue shortness of breath. His cough is mostly dry. There are no fever or chills. He has a history of pulmonary emboli in 2010 which was unprovoked. He currently is on Robaxin. 2 years ago the patient was 120 pounds but has gained back most of it over the past 2 years. He is currently 5 feet 10 weight 341 pounds. Of note the patient used to use CPAP but stopped in March of 2017. He was diagnosed with severe sleep apnea in 2014. He admits to snoring. There is no documented witnessed apneas. He does sometimes wake up with gasping for breath. He feels slightly sleepy during the day but his Irvine score is 8.

## 2019-12-18 ENCOUNTER — RX RENEWAL (OUTPATIENT)
Age: 54
End: 2019-12-18

## 2019-12-26 ENCOUNTER — NON-APPOINTMENT (OUTPATIENT)
Age: 54
End: 2019-12-26

## 2019-12-26 ENCOUNTER — APPOINTMENT (OUTPATIENT)
Dept: CARDIOLOGY | Facility: CLINIC | Age: 54
End: 2019-12-26
Payer: MEDICARE

## 2019-12-26 VITALS
WEIGHT: 315 LBS | HEART RATE: 97 BPM | BODY MASS INDEX: 45.1 KG/M2 | HEIGHT: 70 IN | SYSTOLIC BLOOD PRESSURE: 120 MMHG | DIASTOLIC BLOOD PRESSURE: 90 MMHG

## 2019-12-26 PROCEDURE — 93000 ELECTROCARDIOGRAM COMPLETE: CPT

## 2019-12-26 PROCEDURE — 99214 OFFICE O/P EST MOD 30 MIN: CPT

## 2019-12-26 PROCEDURE — 99205 OFFICE O/P NEW HI 60 MIN: CPT

## 2019-12-26 RX ORDER — OMEGA-3/DHA/EPA/FISH OIL 300-1000MG
1000 CAPSULE ORAL DAILY
Refills: 0 | Status: DISCONTINUED | COMMUNITY
Start: 2019-12-26 | End: 2019-12-26

## 2019-12-26 RX ORDER — DEXLANSOPRAZOLE 60 MG/1
60 CAPSULE, DELAYED RELEASE ORAL DAILY
Refills: 0 | Status: DISCONTINUED | COMMUNITY
End: 2019-12-26

## 2019-12-26 RX ORDER — CHLORHEXIDINE GLUCONATE 4 %
1000 LIQUID (ML) TOPICAL DAILY
Refills: 0 | Status: ACTIVE | COMMUNITY
Start: 2019-12-26

## 2019-12-26 RX ORDER — EMPAGLIFLOZIN 10 MG/1
10 TABLET, FILM COATED ORAL DAILY
Qty: 90 | Refills: 0 | Status: DISCONTINUED | COMMUNITY
Start: 2019-10-07 | End: 2019-12-26

## 2019-12-26 RX ORDER — HYDROCORTISONE 25 MG/G
2.5 CREAM TOPICAL
Refills: 0 | Status: DISCONTINUED | COMMUNITY
End: 2019-12-26

## 2019-12-26 NOTE — HISTORY OF PRESENT ILLNESS
[FreeTextEntry1] : This 54 year-old male patient presents for cardiovascular evaluation.\par \par His problem list is as noted above.\par \par His last examination in January 2019 the patient has had multiple medical interactions.  He underwent bilateral inguinal herniorrhaphy without event.  He had an episode of "walking pneumonia" approximately 2 months ago.  He was treated and recovered.  He has had multiple physician examinations including his primary care physician, pulmonologist, endocrinologist.  He has gained considerable weight and now again has sleep apnea.  His diabetes is being managed.  He also will be seeing his orthopedist.  He has had a recurrence of significant right-sided sciatica and bilateral knee discomfort.\par \par The patient states he can walk for 5 to 10 minutes but is limited then by his sciatica and knee discomfort.  He has some degree of shortness of breath but no acute decompensation.  No chest discomfort, palpitation, fainting.  He has had a couple of trips and falls.  He is currently using a cane.

## 2019-12-26 NOTE — DISCUSSION/SUMMARY
[FreeTextEntry1] : Brief recommendations and follow-up: (see above for details)\par \par The patient overall cardiovascular status is stable but he needs considerable attention to risk factor reduction.\par His blood pressure is not optimal and I have increased his losartan to 50 mg twice a day\par I advised that he may discontinue his fish oil.\par I agree with the recommendation for a formal nutritional consultation.\par He anticipates laboratories with his next endocrinology visit.\par He has an upcoming orthopedic evaluation.\par Next full cardiology visit 4 months.

## 2019-12-26 NOTE — PHYSICAL EXAM
[Normal Conjunctiva] : the conjunctiva exhibited no abnormalities [No Oral Pallor] : no oral pallor [Normal Oral Mucosa] : normal oral mucosa [Eyelids - No Xanthelasma] : the eyelids demonstrated no xanthelasmas [Normal Jugular Venous A Waves Present] : normal jugular venous A waves present [No Oral Cyanosis] : no oral cyanosis [Normal Jugular Venous V Waves Present] : normal jugular venous V waves present [No Jugular Venous Tillman A Waves] : no jugular venous tillman A waves [Auscultation Breath Sounds / Voice Sounds] : lungs were clear to auscultation bilaterally [Respiration, Rhythm And Depth] : normal respiratory rhythm and effort [Exaggerated Use Of Accessory Muscles For Inspiration] : no accessory muscle use [Abdomen Soft] : soft [Heart Sounds] : normal S1 and S2 [Murmurs] : no murmurs present [Heart Rate And Rhythm] : heart rate and rhythm were normal [Abdomen Mass (___ Cm)] : no abdominal mass palpated [Abdomen Tenderness] : non-tender [Skin Color & Pigmentation] : normal skin color and pigmentation [] : no rash [No Venous Stasis] : no venous stasis [No Skin Ulcers] : no skin ulcer [No Xanthoma] : no  xanthoma was observed [Skin Lesions] : no skin lesions [Mood] : the mood was normal [Affect] : the affect was normal [Oriented To Time, Place, And Person] : oriented to person, place, and time [No Anxiety] : not feeling anxious [FreeTextEntry1] : 1+ right-sided edema, chronic.  Trace left.

## 2019-12-26 NOTE — REASON FOR VISIT
[FreeTextEntry1] : Mr. ELMER MOSQUERA has the following problem list.\par \par Chronic atrial fibrillation\par Dyslipidemia\par Hypertension\par History of pulmonary embolism\par Type 2 diabetes\par Aortic dilatation\par Obesity\par History of sleep apnea\par \par He has additional medical problems as noted.\par \par His primary care physician is Dr. Metcalf\par

## 2020-01-09 ENCOUNTER — APPOINTMENT (OUTPATIENT)
Dept: HEMATOLOGY ONCOLOGY | Facility: CLINIC | Age: 55
End: 2020-01-09
Payer: MEDICARE

## 2020-01-09 ENCOUNTER — RESULT REVIEW (OUTPATIENT)
Age: 55
End: 2020-01-09

## 2020-01-09 VITALS
WEIGHT: 315 LBS | RESPIRATION RATE: 24 BRPM | SYSTOLIC BLOOD PRESSURE: 135 MMHG | BODY MASS INDEX: 45.1 KG/M2 | TEMPERATURE: 98.9 F | OXYGEN SATURATION: 94 % | HEART RATE: 95 BPM | HEIGHT: 70 IN | DIASTOLIC BLOOD PRESSURE: 93 MMHG

## 2020-01-09 PROCEDURE — 99214 OFFICE O/P EST MOD 30 MIN: CPT

## 2020-01-09 NOTE — ASSESSMENT
[FreeTextEntry1] : Patient with h/o unprovoked pulmonary embolism 2010 followed by chronic atrial fibrillation 2011 on chronic anticoagulation. \par Patient changed 2017 to dabigatran from warfarin - tolerates well. \par \par Plan for hernia repair - May 2019\par S/p 3 falls, mechanical - PT ordered for gait training \par Continue dabigatran \par Repeat iron parameters - previously reported ferritin 17. Increased to ferritin 54, takes oral iron.\par GI work up - colonoscopy - 2013, EGD 6/16\par \par Leukocytosis - new WBC 12.8, Hg 14, ANC 10.4. Denies infection. Uses steroids cream on hands. Last CBC - 3/19  WNL. Steroid injection 2 weeks ago - possible culprit\par Plan - repeat CBC in 2 weeks. Ordered manual diff. \par \par

## 2020-01-09 NOTE — REVIEW OF SYSTEMS
[Fever] : no fever [Recent Change In Weight] : ~T no recent weight change [Eye Pain] : no eye pain [Dysphagia] : no dysphagia [Vision Problems] : no vision problems [Hoarseness] : no hoarseness [Chest Pain] : no chest pain [Palpitations] : no palpitations [Lower Ext Edema] : no lower extremity edema [Shortness Of Breath] : no shortness of breath [Cough] : no cough [Abdominal Pain] : no abdominal pain [Diarrhea] : no diarrhea [Constipation] : no constipation [Muscle Pain] : no muscle pain [Dysuria] : no dysuria [Joint Pain] : no joint pain [Dizziness] : no dizziness [Skin Rash] : no skin rash [Insomnia] : no insomnia [Depression] : no depression [Hot Flashes] : no hot flashes [Anxiety] : no anxiety [Muscle Weakness] : no muscle weakness [Easy Bleeding] : no tendency for easy bleeding [Easy Bruising] : no tendency for easy bruising

## 2020-01-09 NOTE — PHYSICAL EXAM
[de-identified] : trace edema, varicose veins [de-identified] : right flank - lipoma removed, no bleeding

## 2020-01-09 NOTE — CONSULT LETTER
[FreeTextEntry3] : Ariana Gibson MD\par [facility] [place] at [facility]\par

## 2020-01-09 NOTE — HISTORY OF PRESENT ILLNESS
[de-identified] : Patient here today for follow-up for history of PE, on pradaxa. States that he is feeling well. Had pneumonia in November 2019, underwent home sleep study, awaiting results  [de-identified] : Patient is a 52 year old male being seen for history of PE. He was diagnosed with PE 1/2010,  placed on enoxaparin transitioned to warfarin. PE felt to be unprovoked and hypercoag work up was negative for prothrombin and factor V mutation and antiphospholipid syndrome.  Ferritin level was also low.  Mr Blackwood was morbidly obese in 2010 weighing 350lbs.  He has lost over 100lbs in last 6 months. He has a history of HTN, atrial fibrillation and diabetes.  He has since been able to come off several medications due to weight loss.  He continues on warfarin with no bleeding or bruising and his INR is stable.

## 2020-01-13 ENCOUNTER — RX RENEWAL (OUTPATIENT)
Age: 55
End: 2020-01-13

## 2020-01-23 ENCOUNTER — RESULT REVIEW (OUTPATIENT)
Age: 55
End: 2020-01-23

## 2020-01-23 ENCOUNTER — APPOINTMENT (OUTPATIENT)
Dept: HEMATOLOGY ONCOLOGY | Facility: CLINIC | Age: 55
End: 2020-01-23
Payer: MEDICARE

## 2020-01-23 VITALS
DIASTOLIC BLOOD PRESSURE: 89 MMHG | SYSTOLIC BLOOD PRESSURE: 142 MMHG | HEART RATE: 84 BPM | OXYGEN SATURATION: 97 % | TEMPERATURE: 98.4 F | WEIGHT: 315 LBS | HEIGHT: 70 IN | BODY MASS INDEX: 45.1 KG/M2 | RESPIRATION RATE: 18 BRPM

## 2020-01-23 PROCEDURE — 99213 OFFICE O/P EST LOW 20 MIN: CPT

## 2020-01-23 NOTE — HISTORY OF PRESENT ILLNESS
[de-identified] : Patient is a 54 year old male being seen for history of PE. He was diagnosed with PE 1/2010,  placed on enoxaparin transitioned to warfarin. PE felt to be unprovoked and hypercoag work up was negative for prothrombin and factor V mutation and antiphospholipid syndrome.  Ferritin level was also low.  Mr Blackwood was morbidly obese in 2010 weighing 350lbs.  He has lost over 100lbs in last 6 months. He has a history of HTN, atrial fibrillation and diabetes.  He has since been able to come off several medications due to weight loss.  He continues on warfarin with no bleeding or bruising and his INR is stable.  [de-identified] : Patient here today for follow-up for history of PE, on pradaxa, recently with leukocytosis but received multiple steroid injections to the knee.

## 2020-01-23 NOTE — CONSULT LETTER
[Dear  ___] : Dear  [unfilled], [Consult Letter:] : I had the pleasure of evaluating your patient, [unfilled]. [Please see my note below.] : Please see my note below. [Consult Closing:] : Thank you very much for allowing me to participate in the care of this patient.  If you have any questions, please do not hesitate to contact me. [Sincerely,] : Sincerely, [DrPerez  ___] : Dr. PINEDA [DrPerez ___] : Dr. PINEDA [FreeTextEntry3] : Ariana Gibson MD\par [facility] [place] [facility]\par

## 2020-01-23 NOTE — ASSESSMENT
[FreeTextEntry1] : Patient with h/o unprovoked pulmonary embolism 2010 followed by chronic atrial fibrillation 2011 on chronic anticoagulation. \par Patient changed 2017 to dabigatran from warfarin - tolerates well. \par \par Plan for hernia repair - May 2019\par S/p 3 falls, mechanical - PT ordered for gait training \par Continue dabigatran \par Repeat iron parameters - previously reported ferritin 17. Increased to ferritin 54, takes oral iron.\par GI work up - colonoscopy - 2013, EGD 6/16\par \par Leukocytosis - new WBC 12.8, Hg 14, ANC 10.4. Denies infection. Uses steroids cream on hands. Last CBC - 3/19  WNL. Steroid injection 2 weeks ago - possible culprit, CBC repeated today, WBC down to 10.\par \par History of present illness, review of systems, physical exam and treatment plan reviewed with .\par \par Office visit in  weeks or prn for new or worsening symptoms. \par

## 2020-01-23 NOTE — PHYSICAL EXAM
[Fully active, able to carry on all pre-disease performance without restriction] : Status 0 - Fully active, able to carry on all pre-disease performance without restriction [Obese] : obese [Normal] : affect appropriate [de-identified] : right flank - lipoma removed, no bleeding [de-identified] : trace edema, varicose veins

## 2020-01-23 NOTE — REVIEW OF SYSTEMS
[Joint Pain] : joint pain [Negative] : Allergic/Immunologic [Fever] : no fever [Recent Change In Weight] : ~T no recent weight change [Eye Pain] : no eye pain [Vision Problems] : no vision problems [Dysphagia] : no dysphagia [Hoarseness] : no hoarseness [Chest Pain] : no chest pain [Palpitations] : no palpitations [Lower Ext Edema] : no lower extremity edema [Shortness Of Breath] : no shortness of breath [Cough] : no cough [Abdominal Pain] : no abdominal pain [Constipation] : no constipation [Diarrhea] : no diarrhea [Dysuria] : no dysuria [Muscle Pain] : no muscle pain [Skin Rash] : no skin rash [Dizziness] : no dizziness [Insomnia] : no insomnia [Anxiety] : no anxiety [Depression] : no depression [Hot Flashes] : no hot flashes [Muscle Weakness] : no muscle weakness [Easy Bleeding] : no tendency for easy bleeding [Easy Bruising] : no tendency for easy bruising [FreeTextEntry9] : Knees

## 2020-03-09 ENCOUNTER — RX RENEWAL (OUTPATIENT)
Age: 55
End: 2020-03-09

## 2020-03-18 RX ORDER — DILTIAZEM HYDROCHLORIDE 240 MG/1
240 CAPSULE, EXTENDED RELEASE ORAL
Qty: 90 | Refills: 3 | Status: DISCONTINUED | COMMUNITY
Start: 2019-01-10 | End: 2020-03-18

## 2020-03-18 RX ORDER — ROSUVASTATIN CALCIUM 10 MG/1
10 TABLET, FILM COATED ORAL DAILY
Qty: 90 | Refills: 3 | Status: DISCONTINUED | COMMUNITY
End: 2020-03-18

## 2020-03-24 ENCOUNTER — RX RENEWAL (OUTPATIENT)
Age: 55
End: 2020-03-24

## 2020-03-26 ENCOUNTER — APPOINTMENT (OUTPATIENT)
Dept: INTERNAL MEDICINE | Facility: CLINIC | Age: 55
End: 2020-03-26
Payer: MEDICARE

## 2020-03-26 PROCEDURE — G2012 BRIEF CHECK IN BY MD/QHP: CPT

## 2020-04-14 ENCOUNTER — APPOINTMENT (OUTPATIENT)
Dept: CARDIOLOGY | Facility: CLINIC | Age: 55
End: 2020-04-14

## 2020-05-13 ENCOUNTER — RX RENEWAL (OUTPATIENT)
Age: 55
End: 2020-05-13

## 2020-08-10 ENCOUNTER — APPOINTMENT (OUTPATIENT)
Dept: ENDOCRINOLOGY | Facility: CLINIC | Age: 55
End: 2020-08-10
Payer: MEDICARE

## 2020-08-10 VITALS — WEIGHT: 315 LBS | HEIGHT: 70 IN | BODY MASS INDEX: 45.1 KG/M2

## 2020-08-10 PROCEDURE — 99214 OFFICE O/P EST MOD 30 MIN: CPT | Mod: 95,25

## 2020-08-10 PROCEDURE — G0447 BEHAVIOR COUNSEL OBESITY 15M: CPT | Mod: 95,59

## 2020-08-11 NOTE — REVIEW OF SYSTEMS
[Recent Weight Loss (___ Lbs)] : recent weight loss: [unfilled] lbs [Negative] : Constitutional [FreeTextEntry1] : Gastrointestinal: nausea . passing kidney stones. \par Genitourinary:. hemathuria. \par Musculoskeletal: joint pain and back pain. \par Neurological: headaches. \par Psychiatric: insomnia. \par Endocrine: polydipsia. \par Constitutional, Eyes, ENT, Cardiovascular, Respiratory, Gastrointestinal, Genitourinary, Musculoskeletal, Integumentary, Neurological, Psychiatric, Endocrine and Heme/Lymph are otherwise negative.

## 2020-08-11 NOTE — HISTORY OF PRESENT ILLNESS
[Home] : at home, [unfilled] , at the time of the visit. 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 uncontrolled diabetes \par had a bed accident at home also injured  his knee\par        lost 68lb since on MediFast diet, gained 11 in the last 6 months, still doing well, going to the gym daily, swimming then gained all back, has neck pain, now passing kidney stones\par tried Victoza with Dr Butler had side effects \par        labs reviewed, now thyroid low , A1c 10 \par        tried Tresiba, was having shortness of breath at night and eczema , stopped 5 days ago \par sugars are better per pt he eats better \par        BP meds were adjusted by his PCP , will have hernia repair 12/17 also will see a vascular surgeon per pt his PCP advised to to pains in his legs while sitting \par        did not do US thyroid as advised, last US done 11/16 showed no nodules, so no biopsy was done by Dr Leach\par        body hair groing back, feels "great " per pt \par        started Medifast beginning of November 2016 , "Take shape for Life" \par        stopped insulin all togheter \par        type 2 DM for 10 years 2005 w/o known complications \par        STOPPED Glimepiride 4mg half a tab in am only restarted a week ago \par        4/4/17 STOPPED Metformin 1g bid \par        STOPPED Lantus 30units qhs \par        STOPPED Humalog 18-22......22.....22 per SS\par        forgets about 3 times a week to take his Humalog before his meals \par         Humalog sliding scale before each meal\par        for sugar under 80 do not take it\par         take 18 for breakfast....16 for lunch and 18 for supper\par        131-180 take 20units breakfast....18units for lunch....20units supper\par        181-230 take 22units for breakfast........20units for lunch and 22units for supper\par        231-280 take 24units breakfast........22 for lunch.......24units for supper etc\par        used to be on Byetta , stopped as his sugars were low per pt , Bydureon gave him lumps stooped July 2015\par         HgA1c was 8.8% 4/21/15, 7/15 was 9.0%, HgA1c 10.9% on 11/15, had one with Dr Metcalf a month ago around 8% per pt , 2/'16 HgA1c 8.4%, 5/16 was 8.5%\par        Checks BS none\par        lowest 86\par        has problems checking sugars , has a hard time getting blood put of his fingers \par        Microvascular complications: \par        Nephropathy +microal/cr 83 (<30), repeated 83, EGFR 105 11/4/15\par        Neuropathy symptoms, denies \par        microfilament exam normal today \par        Retinopathy denies last eye exam 1/17 ago Dr Thomas \par        Macrovascular complications: \par        HTN at goal on present meds on Benicar\par        CAD/CVA denies\par        Lipids not at goal , was 105 , TG , 136, were 209 was 229 on with LFT's elevated AST 58, ALT 39, was 51 (<46) used to be on Crestor 5mg qd \par        TSH 10.24, FT4 1.3 on dose was increased from 225 to 250mcg since July 2015 , does not forget \par        takes it at 2am then goes back to sleep\par        11/4/15 TSH 8\par        2/16 TSH 0.88\par        5/16 TSH 1.8\par        CBC mild anemia Hg 12.8 stable \par        had hair loss patchy from his legs , seen Dermatholgy was told likely due to thyroid per pt , now resolved \par        also has crhonic pretibial hyperpigmentation stasis.\par eye exam Dr Thomas 11/18 no DR per pt

## 2020-08-14 ENCOUNTER — RX CHANGE (OUTPATIENT)
Age: 55
End: 2020-08-14

## 2020-09-01 ENCOUNTER — NON-APPOINTMENT (OUTPATIENT)
Age: 55
End: 2020-09-01

## 2020-09-01 ENCOUNTER — APPOINTMENT (OUTPATIENT)
Dept: CARDIOLOGY | Facility: CLINIC | Age: 55
End: 2020-09-01
Payer: MEDICARE

## 2020-09-01 VITALS
HEIGHT: 70 IN | DIASTOLIC BLOOD PRESSURE: 100 MMHG | SYSTOLIC BLOOD PRESSURE: 130 MMHG | HEART RATE: 84 BPM | BODY MASS INDEX: 45.1 KG/M2 | WEIGHT: 315 LBS

## 2020-09-01 VITALS — DIASTOLIC BLOOD PRESSURE: 90 MMHG | SYSTOLIC BLOOD PRESSURE: 135 MMHG

## 2020-09-01 PROCEDURE — 93000 ELECTROCARDIOGRAM COMPLETE: CPT

## 2020-09-01 PROCEDURE — 99215 OFFICE O/P EST HI 40 MIN: CPT

## 2020-09-01 NOTE — HISTORY OF PRESENT ILLNESS
[FreeTextEntry1] : This 55 year-old male patient presents for cardiovascular evaluation.\par \par His problem list is as noted above.\par \par Since his last examination he reports some medical interactions.  He did suffer a trip and fall with a laceration of his left lower extremity.\par \par He reports no major events or hospitalizations.  He currently has been struggling with some significant orthopedic and back issues.  He is going to be seeing a pain specialist shortly.\par \par He has seen his endocrinologist and pulmonologist and hematologist.\par \par He has been struggling with his weight.  He is under tremendous stress.  It is been difficult for him during the current COVID-19 pandemic.  His son is also a .\par \par The patient can perform his activities of daily living but does have significant functional limitations including some chronic dyspnea on exertion.  This is multifactorial.\par

## 2020-09-01 NOTE — DISCUSSION/SUMMARY
[FreeTextEntry1] : Brief recommendations and follow-up: (see above for details)\par \par The patient remains with a considerable problem list.\par His blood pressure is mildly elevated but he was in some pain.  For now continue current routine.\par He will take his blood pressure at home and keep me posted.\par Focus on therapeutic lifestyle treatment.\par He is anticipating evaluation with a pain specialist.  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

## 2020-09-01 NOTE — ASSESSMENT
[FreeTextEntry1] : KG 9/1/2020.  Atrial fibrillation.  Controlled.  R S waves V1 through V3.  Due to body habitus.  No acute changes.\par EKG atrial fibrillation.  Controlled.  Otherwise within normal limits.

## 2020-10-19 ENCOUNTER — RESULT REVIEW (OUTPATIENT)
Age: 55
End: 2020-10-19

## 2020-10-19 ENCOUNTER — APPOINTMENT (OUTPATIENT)
Dept: HEMATOLOGY ONCOLOGY | Facility: CLINIC | Age: 55
End: 2020-10-19
Payer: MEDICARE

## 2020-10-19 VITALS
HEART RATE: 83 BPM | WEIGHT: 315 LBS | BODY MASS INDEX: 45.1 KG/M2 | OXYGEN SATURATION: 98 % | TEMPERATURE: 97.5 F | HEIGHT: 70 IN | DIASTOLIC BLOOD PRESSURE: 92 MMHG | SYSTOLIC BLOOD PRESSURE: 147 MMHG | RESPIRATION RATE: 20 BRPM

## 2020-10-19 PROCEDURE — 99213 OFFICE O/P EST LOW 20 MIN: CPT

## 2020-10-20 NOTE — CONSULT LETTER
[Dear  ___] : Dear  [unfilled], [Consult Letter:] : I had the pleasure of evaluating your patient, [unfilled]. [Please see my note below.] : Please see my note below. [Consult Closing:] : Thank you very much for allowing me to participate in the care of this patient.  If you have any questions, please do not hesitate to contact me. [Sincerely,] : Sincerely, [DrPerez ___] : Dr. PINEDA [DrPerez  ___] : Dr. PINEDA [FreeTextEntry3] : Ariana Gibson MD\par [facility] [place] at [facility]\par

## 2020-10-20 NOTE — HISTORY OF PRESENT ILLNESS
[de-identified] : Patient is a 54 year old male being seen for history of PE. He was diagnosed with PE 1/2010,  placed on enoxaparin transitioned to warfarin. PE felt to be unprovoked and hypercoag work up was negative for prothrombin and factor V mutation and antiphospholipid syndrome.  Ferritin level was also low.  Mr Blackwood was morbidly obese in 2010 weighing 350lbs.  He has lost over 100lbs in last 6 months. He has a history of HTN, atrial fibrillation and diabetes.  He has since been able to come off several medications due to weight loss.  He continues on warfarin with no bleeding or bruising and his INR is stable.  [de-identified] : Patient here today for follow-up for history of PE, on pradaxa- needs clearance for two dental extractions-

## 2020-10-20 NOTE — REVIEW OF SYSTEMS
[Joint Pain] : joint pain [Negative] : Allergic/Immunologic [Fever] : no fever [Eye Pain] : no eye pain [Recent Change In Weight] : ~T no recent weight change [Vision Problems] : no vision problems [Dysphagia] : no dysphagia [Hoarseness] : no hoarseness [Palpitations] : no palpitations [Chest Pain] : no chest pain [Shortness Of Breath] : no shortness of breath [Lower Ext Edema] : no lower extremity edema [Cough] : no cough [Abdominal Pain] : no abdominal pain [Constipation] : no constipation [Diarrhea] : no diarrhea [Dysuria] : no dysuria [Muscle Pain] : no muscle pain [Skin Rash] : no skin rash [Dizziness] : no dizziness [Insomnia] : no insomnia [Anxiety] : no anxiety [Depression] : no depression [Hot Flashes] : no hot flashes [Muscle Weakness] : no muscle weakness [Easy Bleeding] : no tendency for easy bleeding [Easy Bruising] : no tendency for easy bruising [FreeTextEntry9] : Knees

## 2020-10-20 NOTE — PHYSICAL EXAM
[Fully active, able to carry on all pre-disease performance without restriction] : Status 0 - Fully active, able to carry on all pre-disease performance without restriction [Obese] : obese [Normal] : affect appropriate [de-identified] : right flank - lipoma removed, no bleeding [de-identified] : trace edema, varicose veins

## 2020-10-20 NOTE — ASSESSMENT
[FreeTextEntry1] : Patient with h/o unprovoked pulmonary embolism 2010 followed by chronic atrial fibrillation 2011 on chronic anticoagulation. \par Patient changed 2017 to dabigatran from warfarin - tolerates well- no evidence of bleeding\par Continue dabigatran \par Repeat iron parameters - last ferritin 52- repeat today, HGB 13.4 today \par GI work up - colonoscopy - 2013, EGD 6/16- needs follow up\par Leukocytosis- resolved WBC 7.5 today\par \par Having two teeth extracted on Friday 10/23- instructed to hold Pradaxa for 24 hrs prior to extractions, may resume that night per oral surgeon if bleeding is minimal- pt verbalized understanding .\par \par Follow up in 6 months, case and plan reviewed with Dr. Gibson\par

## 2020-10-20 NOTE — DISCUSSION/SUMMARY
[FreeTextEntry1] : Plan for two dental extractions- will hold Pradaxa for 24 hrs prior to extractions- may resume that evening or next morning after extractions per oral surgeon- dependant upon post op bleeding-  bleeding precautions reviewed with patient- verbalized understanding.

## 2020-11-06 ENCOUNTER — RX RENEWAL (OUTPATIENT)
Age: 55
End: 2020-11-06

## 2020-11-18 ENCOUNTER — APPOINTMENT (OUTPATIENT)
Dept: GASTROENTEROLOGY | Facility: CLINIC | Age: 55
End: 2020-11-18

## 2020-11-19 ENCOUNTER — APPOINTMENT (OUTPATIENT)
Dept: ENDOCRINOLOGY | Facility: CLINIC | Age: 55
End: 2020-11-19

## 2020-12-18 ENCOUNTER — RX RENEWAL (OUTPATIENT)
Age: 55
End: 2020-12-18

## 2020-12-30 ENCOUNTER — APPOINTMENT (OUTPATIENT)
Dept: GASTROENTEROLOGY | Facility: CLINIC | Age: 55
End: 2020-12-30
Payer: MEDICARE

## 2020-12-30 VITALS
WEIGHT: 315 LBS | HEART RATE: 83 BPM | RESPIRATION RATE: 20 BRPM | OXYGEN SATURATION: 97 % | DIASTOLIC BLOOD PRESSURE: 84 MMHG | BODY MASS INDEX: 45.1 KG/M2 | TEMPERATURE: 97.9 F | HEIGHT: 70 IN | SYSTOLIC BLOOD PRESSURE: 132 MMHG

## 2020-12-30 PROCEDURE — 99204 OFFICE O/P NEW MOD 45 MIN: CPT

## 2020-12-30 PROCEDURE — 99214 OFFICE O/P EST MOD 30 MIN: CPT

## 2020-12-30 RX ORDER — CEPHALEXIN 500 MG/1
500 CAPSULE ORAL
Qty: 21 | Refills: 0 | Status: COMPLETED | COMMUNITY
Start: 2020-05-21 | End: 2020-12-30

## 2020-12-30 RX ORDER — CYCLOBENZAPRINE HYDROCHLORIDE 10 MG/1
10 TABLET, FILM COATED ORAL
Qty: 90 | Refills: 0 | Status: COMPLETED | COMMUNITY
Start: 2020-09-23 | End: 2020-12-30

## 2020-12-30 RX ORDER — GABAPENTIN 300 MG/1
300 CAPSULE ORAL
Qty: 30 | Refills: 0 | Status: COMPLETED | COMMUNITY
Start: 2020-10-03 | End: 2020-12-01

## 2020-12-30 NOTE — CONSULT LETTER
[Dear  ___] : Dear  [unfilled], [Consult Letter:] : I had the pleasure of evaluating your patient, [unfilled]. [Please see my note below.] : Please see my note below. [Consult Closing:] : Thank you very much for allowing me to participate in the care of this patient.  If you have any questions, please do not hesitate to contact me. [Sincerely,] : Sincerely, [FreeTextEntry3] : Pedrito Nieto MD\par tel: [phone]\par fax: [phone]\par  [DrPerez  ___] : Dr. PINEDA [DrPerez ___] : Dr. PINEDA

## 2020-12-30 NOTE — HISTORY OF PRESENT ILLNESS
[de-identified] : ELMER MOSQUERA  is being evaluated at the request of Dr. Devang Metcalf for an opinion re: GERD and H/H slightly decreased at 13/40. Admits to frequent Heartburm. Additionally seeing Dr. Ronquillo for hematuria. Denies BRBPR / melena / hematemesis\par EGD in 7/2018 for dysphagia was unremarkable. Prior to that, was seen 2/2018  for follow up for rectal bleeding / itching / burning and pain (on Pradaxa).  Had purposefully lost weight with Medifast / exercise. Seen in 2/2015 for self limited abdominal pain / nausea. Work up was unrevealing ( including CAT scan revealing unchanged 1.1 cm liver lesion, nephrolithiasis / renal cyst) . Sonogram reportedly showed GB sludge. Referred to Dr. Loera who apparently did not believe the GB was the problem. EGD 12/2014 ( reflux) revealed a hyperplastic gastric polyp. Prior to that had colonoscopy for diarrhea on 10/2013 ( non specific inflam changes ./ adenoma / c. diff negative ) and on 5/29/12 ( diverticulosis and hemorrhoids / Random biopsies were normal /Stool studies were notable for C.diff which was treated with Flagyl) .\par \par The patient also has a h/o diverticulitis, last attack in 2008. CAT scan in 2012 for abnormal liver enzymes ( AST =79) and a liver lesion seen on a CAt scan in 2008 revealed fatty liver and an unchanged liver lesion ( c/w 2008) consistent with a cyst or hamartoma. \par \par Colonoscopy 12/2010 revealed diverticulosis, hemorrhoids and a benign polyp. EGD in 2010 / 2005 revealed gastritis and a small HH.\par \par Colonoscopy in 2008 revealed hemorrhoids, diverticulosis and a benign polyp. Similar findings were noted at a colonoscopy in 2006 / 2004. \par

## 2020-12-30 NOTE — ASSESSMENT
[FreeTextEntry1] : Anemia / GERD:  EGD  and colonoscopy planned to evaluate mild anemia. Hematuria evaluation as planned\par Risks of the procedure including but not limited to bleeding / perforation / infection / anesthesia complication / missed polyp or lesion explained to the  patient . The patient expressed understanding and a desire to proceed with the procedure.\par \par Risk of not doing procedure includes but is not limited to missed or delayed diagnosis of colon cancer or other colonic pathology\par

## 2020-12-30 NOTE — PHYSICAL EXAM
[Neck Appearance] : the appearance of the neck was normal [] : no respiratory distress [Apical Impulse] : the apical impulse was normal [Abdomen Soft] : soft [FreeTextEntry1] : deferred [Abnormal Walk] : normal gait [Skin Color & Pigmentation] : normal skin color and pigmentation [No Focal Deficits] : no focal deficits [Oriented To Time, Place, And Person] : oriented to person, place, and time

## 2021-01-04 ENCOUNTER — APPOINTMENT (OUTPATIENT)
Dept: HEMATOLOGY ONCOLOGY | Facility: CLINIC | Age: 56
End: 2021-01-04
Payer: MEDICARE

## 2021-01-04 VITALS
DIASTOLIC BLOOD PRESSURE: 95 MMHG | WEIGHT: 315 LBS | BODY MASS INDEX: 45.1 KG/M2 | HEIGHT: 70 IN | RESPIRATION RATE: 18 BRPM | SYSTOLIC BLOOD PRESSURE: 152 MMHG | TEMPERATURE: 98.6 F | HEART RATE: 98 BPM | OXYGEN SATURATION: 98 %

## 2021-01-04 PROCEDURE — 99214 OFFICE O/P EST MOD 30 MIN: CPT

## 2021-01-04 NOTE — REVIEW OF SYSTEMS
[Joint Pain] : joint pain [Negative] : Allergic/Immunologic [Fatigue] : fatigue [Fever] : no fever [Recent Change In Weight] : ~T no recent weight change [Eye Pain] : no eye pain [Vision Problems] : no vision problems [Dysphagia] : no dysphagia [Hoarseness] : no hoarseness [Chest Pain] : no chest pain [Palpitations] : no palpitations [Lower Ext Edema] : no lower extremity edema [Shortness Of Breath] : no shortness of breath [Cough] : no cough [Abdominal Pain] : no abdominal pain [Constipation] : no constipation [Diarrhea] : no diarrhea [Dysuria] : no dysuria [Muscle Pain] : no muscle pain [Skin Rash] : no skin rash [Dizziness] : no dizziness [Insomnia] : no insomnia [Anxiety] : no anxiety [Depression] : no depression [Hot Flashes] : no hot flashes [Muscle Weakness] : no muscle weakness [Easy Bleeding] : no tendency for easy bleeding [Easy Bruising] : no tendency for easy bruising [FreeTextEntry4] : upper and lower tooth pain secondary to wisdom teeth  [FreeTextEntry9] : Knees

## 2021-01-04 NOTE — HISTORY OF PRESENT ILLNESS
[de-identified] : Patient is a 54 year old male being seen for history of PE. He was diagnosed with PE 1/2010,  placed on enoxaparin transitioned to warfarin. PE felt to be unprovoked and hypercoag work up was negative for prothrombin and factor V mutation and antiphospholipid syndrome.  Ferritin level was also low.  Mr Blackwood was morbidly obese in 2010 weighing 350lbs.  He has lost over 100lbs in last 6 months. He has a history of HTN, atrial fibrillation and diabetes.  He has since been able to come off several medications due to weight loss.  He continues on warfarin with no bleeding or bruising and his INR is stable.  [de-identified] : Patient here today for follow-up for history of PE, on pradaxa- needs clearance for wisdom teeth extractions due to increased pain- did not have it done back in Oct

## 2021-01-04 NOTE — CONSULT LETTER
[Dear  ___] : Dear  [unfilled], [Consult Letter:] : I had the pleasure of evaluating your patient, [unfilled]. [Please see my note below.] : Please see my note below. [Consult Closing:] : Thank you very much for allowing me to participate in the care of this patient.  If you have any questions, please do not hesitate to contact me. [Sincerely,] : Sincerely, [DrPerez  ___] : Dr. PNIEDA [DrPerez ___] : Dr. PINEDA [FreeTextEntry3] : Ariana Gibson MD\par [facility] [place] [facility]\par

## 2021-01-04 NOTE — PHYSICAL EXAM
[Fully active, able to carry on all pre-disease performance without restriction] : Status 0 - Fully active, able to carry on all pre-disease performance without restriction [Obese] : obese [Normal] : affect appropriate [de-identified] : right flank - lipoma removed, no bleeding [de-identified] : trace edema, varicose veins

## 2021-01-04 NOTE — ASSESSMENT
[FreeTextEntry1] : Patient with h/o unprovoked pulmonary embolism 2010 followed by chronic atrial fibrillation 2011 on chronic anticoagulation. \par Patient changed 2017 to dabigatran from warfarin - tolerates well- no evidence of bleeding\par Continue dabigatran \par Repeat iron parameters - last ferritin 52- repeat today, HGB 13.4 today \par GI work up - colonoscopy - 2013, EGD 6/16- needs follow up\par Leukocytosis- resolved\par \par Having two teeth extracted (wisdom teeth) this month- instructed to hold Pradaxa for 24 hrs prior to extractions, may resume that night per oral surgeon if bleeding is minimal - up to discretion of oral surgeon dependant upon bleeding- pt verbalized understanding and aware to call office with any questions or concerns.  Outside labs reviewed with patient, CBC stable.  He has a follow up in Feb with endocrinologist for his DM and is having a colonoscopy in Feb- he is to follow up in April here in office.  \par \par Follow up in 3 months, case and plan reviewed with Dr. Gibson- pt to call office with any questions and or concerns.  \par

## 2021-01-12 ENCOUNTER — APPOINTMENT (OUTPATIENT)
Dept: CARDIOLOGY | Facility: CLINIC | Age: 56
End: 2021-01-12
Payer: MEDICARE

## 2021-01-12 ENCOUNTER — NON-APPOINTMENT (OUTPATIENT)
Age: 56
End: 2021-01-12

## 2021-01-12 VITALS
HEART RATE: 91 BPM | DIASTOLIC BLOOD PRESSURE: 90 MMHG | HEIGHT: 70 IN | BODY MASS INDEX: 45.1 KG/M2 | OXYGEN SATURATION: 98 % | WEIGHT: 315 LBS | SYSTOLIC BLOOD PRESSURE: 140 MMHG

## 2021-01-12 PROCEDURE — 99215 OFFICE O/P EST HI 40 MIN: CPT

## 2021-01-12 RX ORDER — LOSARTAN POTASSIUM AND HYDROCHLOROTHIAZIDE 12.5; 1 MG/1; MG/1
TABLET ORAL
Refills: 0 | Status: DISCONTINUED | COMMUNITY
End: 2021-01-12

## 2021-01-12 RX ORDER — SILVER SULFADIAZINE 10 MG/G
1 CREAM TOPICAL
Qty: 50 | Refills: 0 | Status: DISCONTINUED | COMMUNITY
Start: 2020-05-11 | End: 2021-01-12

## 2021-01-12 NOTE — REASON FOR VISIT
[FreeTextEntry1] : Mr. ELMER MOSQUERA has the following problem list.\par \par Chronic atrial fibrillation\par Dyslipidemia\par Hypertension\par History of pulmonary embolism\par Type 2 diabetes\par Aortic dilatation\par Obesity\par History of sleep apnea\par \par He has additional medical problems as noted.\par This includes kidney stones\par \par His primary care physician is Dr. Metcalf\par

## 2021-01-12 NOTE — HISTORY OF PRESENT ILLNESS
[FreeTextEntry1] : This 55 year-old male patient presents for cardiovascular evaluation.\par \par His problem list is as noted above.\par \par The patient is seen on an expedited basis.  He reports that since our last assessment 4 months ago he has had some medical interactions.  He had 2 episodes of kidney stones.  The manifestation was hematuria rather than pain.  He is anticipating urologic procedure under general anesthesia next week by Dr. Ronquillo.\par \par The patient also reports that he will be having 2 wisdom teeth removed tomorrow.  He has had discussion of his anticoagulation management with Dr. Forbes.\par \par Patient reports his overall cardiovascular status has been stable.  He has been quite sedentary during the pandemic.  I did encourage him regarding this.  He can do his household activities.  There have been no acute symptoms of shortness of breath, chest discomfort, palpitation, lightheadedness, fainting.

## 2021-01-12 NOTE — PHYSICAL EXAM
[Normal Conjunctiva] : the conjunctiva exhibited no abnormalities [Eyelids - No Xanthelasma] : the eyelids demonstrated no xanthelasmas [Normal Oral Mucosa] : normal oral mucosa [No Oral Pallor] : no oral pallor [No Oral Cyanosis] : no oral cyanosis [Normal Jugular Venous A Waves Present] : normal jugular venous A waves present [Normal Jugular Venous V Waves Present] : normal jugular venous V waves present [No Jugular Venous Tillman A Waves] : no jugular venous tillman A waves [Respiration, Rhythm And Depth] : normal respiratory rhythm and effort [Exaggerated Use Of Accessory Muscles For Inspiration] : no accessory muscle use [Auscultation Breath Sounds / Voice Sounds] : lungs were clear to auscultation bilaterally [Heart Rate And Rhythm] : heart rate and rhythm were normal [Heart Sounds] : normal S1 and S2 [Murmurs] : no murmurs present [Abdomen Soft] : soft [Abdomen Tenderness] : non-tender [Abdomen Mass (___ Cm)] : no abdominal mass palpated [Skin Color & Pigmentation] : normal skin color and pigmentation [] : no rash [No Venous Stasis] : no venous stasis [Skin Lesions] : no skin lesions [No Skin Ulcers] : no skin ulcer [No Xanthoma] : no  xanthoma was observed [Oriented To Time, Place, And Person] : oriented to person, place, and time [Affect] : the affect was normal [Mood] : the mood was normal [No Anxiety] : not feeling anxious [FreeTextEntry1] : Using a cane.  Knee discomfort and right-sided sciatica.

## 2021-01-12 NOTE — ASSESSMENT
[FreeTextEntry1] : EKG from hospital 1/12/2021.  Atrial fibrillation, controlled.  R S waves in lead III and aVF.  No acute changes.\par EKG 9/1/2020.  Atrial fibrillation.  Controlled.  R S waves V1 through V3.  Due to body habitus.  No acute changes.\par EKG atrial fibrillation.  Controlled.  Otherwise within normal limits.

## 2021-01-12 NOTE — DISCUSSION/SUMMARY
[FreeTextEntry1] : Brief recommendations and follow-up: (see above for details)\par \par As noted the patient is an acceptable candidate for his upcoming urologic procedure as well as having his wisdom teeth pulled tomorrow.\par Recommendations are as noted above.\par He remains with a number of cardiovascular medical issues that are addressed as noted.\par The patient is completing a course of antibiotics and will have his wisdom teeth removed as noted.\par Next routine cardiology visit 6 months.

## 2021-02-01 ENCOUNTER — APPOINTMENT (OUTPATIENT)
Dept: ENDOCRINOLOGY | Facility: CLINIC | Age: 56
End: 2021-02-01
Payer: MEDICARE

## 2021-02-01 VITALS — WEIGHT: 315 LBS | BODY MASS INDEX: 45.1 KG/M2 | HEIGHT: 70 IN

## 2021-02-01 PROCEDURE — 99215 OFFICE O/P EST HI 40 MIN: CPT | Mod: 95

## 2021-02-01 PROCEDURE — G0447 BEHAVIOR COUNSEL OBESITY 15M: CPT | Mod: 59,95
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
## 2021-02-03 ENCOUNTER — RX RENEWAL (OUTPATIENT)
Age: 56
End: 2021-02-03

## 2021-02-14 ENCOUNTER — RX RENEWAL (OUTPATIENT)
Age: 56
End: 2021-02-14

## 2021-03-02 ENCOUNTER — APPOINTMENT (OUTPATIENT)
Dept: CARDIOLOGY | Facility: CLINIC | Age: 56
End: 2021-03-02

## 2021-03-09 ENCOUNTER — RX RENEWAL (OUTPATIENT)
Age: 56
End: 2021-03-09

## 2021-03-24 ENCOUNTER — RX RENEWAL (OUTPATIENT)
Age: 56
End: 2021-03-24

## 2021-04-02 ENCOUNTER — RESULT REVIEW (OUTPATIENT)
Age: 56
End: 2021-04-02

## 2021-04-04 ENCOUNTER — RESULT REVIEW (OUTPATIENT)
Age: 56
End: 2021-04-04

## 2021-04-05 ENCOUNTER — APPOINTMENT (OUTPATIENT)
Dept: GASTROENTEROLOGY | Facility: HOSPITAL | Age: 56
End: 2021-04-05

## 2021-04-09 ENCOUNTER — RX RENEWAL (OUTPATIENT)
Age: 56
End: 2021-04-09

## 2021-04-12 ENCOUNTER — APPOINTMENT (OUTPATIENT)
Dept: HEMATOLOGY ONCOLOGY | Facility: CLINIC | Age: 56
End: 2021-04-12
Payer: MEDICARE

## 2021-04-12 ENCOUNTER — RESULT REVIEW (OUTPATIENT)
Age: 56
End: 2021-04-12

## 2021-04-12 VITALS
WEIGHT: 315 LBS | RESPIRATION RATE: 20 BRPM | SYSTOLIC BLOOD PRESSURE: 157 MMHG | HEART RATE: 85 BPM | HEIGHT: 70 IN | BODY MASS INDEX: 45.1 KG/M2 | DIASTOLIC BLOOD PRESSURE: 102 MMHG | OXYGEN SATURATION: 98 % | TEMPERATURE: 97.8 F

## 2021-04-12 PROCEDURE — 99214 OFFICE O/P EST MOD 30 MIN: CPT

## 2021-04-12 RX ORDER — AMOXICILLIN AND CLAVULANATE POTASSIUM 875; 125 MG/1; MG/1
875-125 TABLET, COATED ORAL
Qty: 28 | Refills: 0 | Status: COMPLETED | COMMUNITY
Start: 2021-01-07

## 2021-04-12 RX ORDER — AMOXICILLIN AND CLAVULANATE POTASSIUM 500; 125 MG/1; MG/1
500-125 TABLET, FILM COATED ORAL
Qty: 10 | Refills: 0 | Status: COMPLETED | COMMUNITY
Start: 2021-01-05

## 2021-04-12 RX ORDER — POTASSIUM CITRATE 10 MEQ/1
10 MEQ TABLET, EXTENDED RELEASE ORAL
Qty: 90 | Refills: 0 | Status: COMPLETED | COMMUNITY
Start: 2021-02-16

## 2021-04-12 RX ORDER — COLCHICINE 0.6 MG/1
0.6 TABLET ORAL
Qty: 30 | Refills: 0 | Status: COMPLETED | COMMUNITY
Start: 2020-11-30

## 2021-04-12 NOTE — PHYSICAL EXAM
[Fully active, able to carry on all pre-disease performance without restriction] : Status 0 - Fully active, able to carry on all pre-disease performance without restriction [Obese] : obese [Normal] : affect appropriate [de-identified] : right flank - lipoma removed, no bleeding [de-identified] : trace edema, varicose veins

## 2021-04-12 NOTE — HISTORY OF PRESENT ILLNESS
[de-identified] : Patient is a 54 year old male being seen for history of PE. He was diagnosed with PE 1/2010,  placed on enoxaparin transitioned to warfarin. PE felt to be unprovoked and hypercoag work up was negative for prothrombin and factor V mutation and antiphospholipid syndrome.  Ferritin level was also low.  Mr Blackwood was morbidly obese in 2010 weighing 350lbs.  He has lost over 100lbs in last 6 months. He has a history of HTN, atrial fibrillation and diabetes.  He has since been able to come off several medications due to weight loss.  He continues on warfarin with no bleeding or bruising and his INR is stable.  [de-identified] : Patient here today for follow-up for history of PE, on pradaxa. Patient had a colonoscopy and EGD last Tuesday, polyps came back negative. Patient had kidney stone surgery back in February.

## 2021-04-12 NOTE — ASSESSMENT
[FreeTextEntry1] : Patient with h/o unprovoked pulmonary embolism 2010 followed by chronic atrial fibrillation 2011 on chronic anticoagulation. \par Patient changed 2017 to dabigatran from warfarin - tolerates well- no evidence of bleeding\par Continue dabigatran \par Repeat iron parameters - last ferritin 52- repeat today, HGB 13.4 today \par GI work up - colonoscopy - 2013, EGD 6/16- needs follow up\par Leukocytosis- resolved\par \par Having two teeth extracted (wisdom teeth) this month- instructed to hold Pradaxa for 24 hrs prior to extractions, may resume that night per oral surgeon if bleeding is minimal - up to discretion of oral surgeon dependant upon bleeding- pt verbalized understanding and aware to call office with any questions or concerns.  Outside labs reviewed with patient, CBC stable.  He has a follow up in Feb with endocrinologist for his DM and is having a colonoscopy in Feb- he is to follow up in April here in office.  \par \par 4/12/2021\par Hg decline, underwent EGD and colonoscopy April 2021 - polyps removed by Dr. Nieto\par Repeat ferritin today , if low will consider camera capsule study\par Continue oral iron \par Repeat CBC in 3 month\par \par

## 2021-04-12 NOTE — REVIEW OF SYSTEMS
[Joint Pain] : joint pain [Negative] : Allergic/Immunologic [Shortness Of Breath] : shortness of breath [Difficulty Walking] : difficulty walking [Insomnia] : insomnia [Easy Bleeding] : a tendency for easy bleeding [Easy Bruising] : a tendency for easy bruising [Fever] : no fever [Fatigue] : no fatigue [Recent Change In Weight] : ~T no recent weight change [Eye Pain] : no eye pain [Vision Problems] : no vision problems [Dysphagia] : no dysphagia [Hoarseness] : no hoarseness [Chest Pain] : no chest pain [Palpitations] : no palpitations [Lower Ext Edema] : no lower extremity edema [Cough] : no cough [Abdominal Pain] : no abdominal pain [Constipation] : no constipation [Diarrhea] : no diarrhea [Dysuria] : no dysuria [Muscle Pain] : no muscle pain [Skin Rash] : no skin rash [Dizziness] : no dizziness [Anxiety] : no anxiety [Depression] : no depression [Hot Flashes] : no hot flashes [Muscle Weakness] : no muscle weakness [FreeTextEntry9] : Knees, slip disc in back  [de-identified] : on blood thinners

## 2021-04-12 NOTE — CONSULT LETTER
[Dear  ___] : Dear  [unfilled], [Consult Letter:] : I had the pleasure of evaluating your patient, [unfilled]. [Please see my note below.] : Please see my note below. [Consult Closing:] : Thank you very much for allowing me to participate in the care of this patient.  If you have any questions, please do not hesitate to contact me. [Sincerely,] : Sincerely, [DrPerez  ___] : Dr. PINEDA [DrPerez ___] : Dr. PINEDA [FreeTextEntry3] : Ariana Gibson MD\par [facility] [place] [facility]\par

## 2021-07-13 ENCOUNTER — NON-APPOINTMENT (OUTPATIENT)
Age: 56
End: 2021-07-13

## 2021-07-14 ENCOUNTER — NON-APPOINTMENT (OUTPATIENT)
Age: 56
End: 2021-07-14

## 2021-07-14 ENCOUNTER — APPOINTMENT (OUTPATIENT)
Dept: CARDIOLOGY | Facility: CLINIC | Age: 56
End: 2021-07-14
Payer: MEDICARE

## 2021-07-14 VITALS
BODY MASS INDEX: 45.1 KG/M2 | HEIGHT: 70 IN | SYSTOLIC BLOOD PRESSURE: 150 MMHG | WEIGHT: 315 LBS | HEART RATE: 96 BPM | DIASTOLIC BLOOD PRESSURE: 100 MMHG | TEMPERATURE: 98.6 F

## 2021-07-14 PROCEDURE — 93000 ELECTROCARDIOGRAM COMPLETE: CPT

## 2021-07-14 PROCEDURE — 99215 OFFICE O/P EST HI 40 MIN: CPT

## 2021-07-19 ENCOUNTER — APPOINTMENT (OUTPATIENT)
Dept: HEMATOLOGY ONCOLOGY | Facility: CLINIC | Age: 56
End: 2021-07-19
Payer: MEDICARE

## 2021-07-20 ENCOUNTER — APPOINTMENT (OUTPATIENT)
Dept: CARDIOLOGY | Facility: CLINIC | Age: 56
End: 2021-07-20
Payer: MEDICARE

## 2021-07-20 VITALS
HEIGHT: 70 IN | BODY MASS INDEX: 45.1 KG/M2 | TEMPERATURE: 97.9 F | DIASTOLIC BLOOD PRESSURE: 102 MMHG | WEIGHT: 315 LBS | HEART RATE: 85 BPM | OXYGEN SATURATION: 95 % | SYSTOLIC BLOOD PRESSURE: 144 MMHG

## 2021-07-20 PROCEDURE — 36415 COLL VENOUS BLD VENIPUNCTURE: CPT

## 2021-07-20 PROCEDURE — 99214 OFFICE O/P EST MOD 30 MIN: CPT

## 2021-07-20 NOTE — HISTORY OF PRESENT ILLNESS
[FreeTextEntry1] : 56 year old male with hypertension, dyslipidemia, chronic atrial fibrillation on Pradaxa, history of pulmonary embolism, DM type 2, aortic dilatation 4.3cm, NICOLE, hypothyroidism, GERD, kidney stones, gout, BPH, family history of CAD, former smoker.

## 2021-07-20 NOTE — REASON FOR VISIT
[Hypertension] : hypertension [FreeTextEntry1] : Mr. Blackwood called the office with report of elevated BP readings, 171/120 160/110s. Asymptomatic. Started chlorthalidone 25mg daily. He presents today for follow up and nonfasting laboratories. \par \par He denies chest pain, SOB, palpitations, dizziness or syncope, headaches. He brought from home his BP machine.

## 2021-07-20 NOTE — CARDIOLOGY SUMMARY
[de-identified] : 7/14/21 Atrial fibrillation, controlled [de-identified] : \par Stress MIBI 3/10/17. Baseline atrial fibrillation. No ischemia. EF 65%. Gutierrez stage III. 9-1/2 minutes. 11 METs. 85%.  [de-identified] : CT angiogram 10/23/18. No obvious pulmonary embolism. Dilated ascending thoracic aorta, 4.3 cm (3.9 in 2010). [de-identified] : Lower extremity study 7/13/17. Negative.

## 2021-07-20 NOTE — REVIEW OF SYSTEMS
[Fever] : no fever [Headache] : no headache [Chills] : no chills [Feeling Fatigued] : not feeling fatigued [SOB] : no shortness of breath [Chest Discomfort] : no chest discomfort [Palpitations] : no palpitations [Syncope] : no syncope [Cough] : no cough [Dizziness] : no dizziness [Confusion] : no confusion was observed [Easy Bleeding] : no tendency for easy bleeding [Easy Bruising] : a tendency for easy bruising

## 2021-07-20 NOTE — PHYSICAL EXAM
[No Acute Distress] : no acute distress [Irregularly Irregular] : irregularly irregular [Clear Lung Fields] : clear lung fields [Good Air Entry] : good air entry [No Respiratory Distress] : no respiratory distress  [Normal Gait] : normal gait [No Rash] : no rash [Moves all extremities] : moves all extremities [No Focal Deficits] : no focal deficits [Normal Speech] : normal speech [Alert and Oriented] : alert and oriented [Normal memory] : normal memory

## 2021-07-21 DIAGNOSIS — K83.8 OTHER SPECIFIED DISEASES OF BILIARY TRACT: ICD-10-CM

## 2021-07-21 LAB
ANION GAP SERPL CALC-SCNC: 14 MMOL/L
BUN SERPL-MCNC: 21 MG/DL
CALCIUM SERPL-MCNC: 9.4 MG/DL
CHLORIDE SERPL-SCNC: 96 MMOL/L
CO2 SERPL-SCNC: 26 MMOL/L
CREAT SERPL-MCNC: 0.83 MG/DL
GLUCOSE SERPL-MCNC: 257 MG/DL
POTASSIUM SERPL-SCNC: 4 MMOL/L
SODIUM SERPL-SCNC: 136 MMOL/L

## 2021-07-27 ENCOUNTER — RESULT REVIEW (OUTPATIENT)
Age: 56
End: 2021-07-27

## 2021-08-02 ENCOUNTER — APPOINTMENT (OUTPATIENT)
Dept: HEMATOLOGY ONCOLOGY | Facility: CLINIC | Age: 56
End: 2021-08-02
Payer: MEDICARE

## 2021-08-02 ENCOUNTER — RESULT REVIEW (OUTPATIENT)
Age: 56
End: 2021-08-02

## 2021-08-02 ENCOUNTER — NON-APPOINTMENT (OUTPATIENT)
Age: 56
End: 2021-08-02

## 2021-08-02 VITALS
SYSTOLIC BLOOD PRESSURE: 147 MMHG | RESPIRATION RATE: 16 BRPM | BODY MASS INDEX: 59.47 KG/M2 | TEMPERATURE: 97.6 F | OXYGEN SATURATION: 95 % | HEART RATE: 98 BPM | HEIGHT: 61.2 IN | DIASTOLIC BLOOD PRESSURE: 89 MMHG | WEIGHT: 315 LBS

## 2021-08-02 VITALS
RESPIRATION RATE: 16 BRPM | TEMPERATURE: 97.6 F | DIASTOLIC BLOOD PRESSURE: 89 MMHG | WEIGHT: 315 LBS | BODY MASS INDEX: 45.1 KG/M2 | HEART RATE: 107 BPM | HEIGHT: 70 IN | OXYGEN SATURATION: 95 % | SYSTOLIC BLOOD PRESSURE: 147 MMHG

## 2021-08-02 PROCEDURE — 99214 OFFICE O/P EST MOD 30 MIN: CPT

## 2021-08-02 NOTE — CONSULT LETTER
[FreeTextEntry3] : Ariana Gibson MD\par [facility] [place] at [facility]\par

## 2021-08-02 NOTE — PHYSICAL EXAM
[de-identified] : right flank - lipoma removed, no bleeding [de-identified] : trace edema, varicose veins

## 2021-08-02 NOTE — REVIEW OF SYSTEMS
[Fatigue] : fatigue [Fever] : no fever [Recent Change In Weight] : ~T no recent weight change [Eye Pain] : no eye pain [Vision Problems] : no vision problems [Dysphagia] : no dysphagia [Hoarseness] : no hoarseness [Chest Pain] : no chest pain [Palpitations] : no palpitations [Lower Ext Edema] : no lower extremity edema [Cough] : no cough [Abdominal Pain] : no abdominal pain [Constipation] : no constipation [Diarrhea] : no diarrhea [Dysuria] : no dysuria [Muscle Pain] : no muscle pain [Skin Rash] : no skin rash [Dizziness] : no dizziness [Anxiety] : no anxiety [Depression] : no depression [Hot Flashes] : no hot flashes [Muscle Weakness] : no muscle weakness [FreeTextEntry9] : Knees, slip disc in back  [de-identified] : on blood thinners

## 2021-08-02 NOTE — ASSESSMENT
[FreeTextEntry1] : Patient with h/o unprovoked pulmonary embolism 2010 followed by chronic atrial fibrillation 2011 on chronic anticoagulation. \par Patient changed 2017 to dabigatran from warfarin - tolerates well- no evidence of bleeding\par Continue dabigatran \par Repeat iron parameters - last ferritin 52- repeat today, HGB 13.4 today \par GI work up - colonoscopy - 2013, EGD 6/16- needs follow up\par Leukocytosis- resolved\par \par Having two teeth extracted (wisdom teeth) this month- instructed to hold Pradaxa for 24 hrs prior to extractions, may resume that night per oral surgeon if bleeding is minimal - up to discretion of oral surgeon dependant upon bleeding- pt verbalized understanding and aware to call office with any questions or concerns.  Outside labs reviewed with patient, CBC stable.  He has a follow up in Feb with endocrinologist for his DM and is having a colonoscopy in Feb- he is to follow up in April here in office.  \par \par 4/12/2021\par Hg decline, underwent EGD and colonoscopy April 2021 - polyps removed by Dr. Nieto\par Repeat ferritin today , if low will consider camera capsule study\par Continue oral iron \par Repeat CBC in 3 month\par \par 8/2/2021\par Hgb 12.3 today- on oral iron \par check anemia panel today- if drops will send back to GI for possible capsule study \par return in 6 weeks \par \par case and mgmt discussed with Dr. Gibson-

## 2021-08-02 NOTE — HISTORY OF PRESENT ILLNESS
[de-identified] : Patient is a 54 year old male being seen for history of PE. He was diagnosed with PE 1/2010,  placed on enoxaparin transitioned to warfarin. PE felt to be unprovoked and hypercoag work up was negative for prothrombin and factor V mutation and antiphospholipid syndrome.  Ferritin level was also low.  Mr Blackwood was morbidly obese in 2010 weighing 350lbs.  He has lost over 100lbs in last 6 months. He has a history of HTN, atrial fibrillation and diabetes.  He has since been able to come off several medications due to weight loss.  He continues on warfarin with no bleeding or bruising and his INR is stable.  [de-identified] : Patient here today for follow-up for history of PE, on pradaxa. He is feeling well

## 2021-08-03 ENCOUNTER — NON-APPOINTMENT (OUTPATIENT)
Age: 56
End: 2021-08-03

## 2021-08-03 ENCOUNTER — RESULT REVIEW (OUTPATIENT)
Age: 56
End: 2021-08-03

## 2021-08-04 ENCOUNTER — APPOINTMENT (OUTPATIENT)
Dept: ENDOCRINOLOGY | Facility: CLINIC | Age: 56
End: 2021-08-04
Payer: MEDICARE

## 2021-08-04 VITALS
RESPIRATION RATE: 16 BRPM | SYSTOLIC BLOOD PRESSURE: 122 MMHG | HEART RATE: 75 BPM | BODY MASS INDEX: 45.1 KG/M2 | HEIGHT: 70 IN | OXYGEN SATURATION: 98 % | DIASTOLIC BLOOD PRESSURE: 78 MMHG | WEIGHT: 315 LBS

## 2021-08-04 LAB — GLUCOSE BLDC GLUCOMTR-MCNC: 169

## 2021-08-04 PROCEDURE — 82962 GLUCOSE BLOOD TEST: CPT

## 2021-08-04 PROCEDURE — 99215 OFFICE O/P EST HI 40 MIN: CPT | Mod: 25

## 2021-08-04 PROCEDURE — G0447 BEHAVIOR COUNSEL OBESITY 15M: CPT | Mod: 59

## 2021-08-04 RX ORDER — LABETALOL HYDROCHLORIDE 200 MG/1
200 TABLET, FILM COATED ORAL
Refills: 0 | Status: DISCONTINUED | COMMUNITY
End: 2021-08-04

## 2021-08-04 RX ORDER — LABETALOL HYDROCHLORIDE 200 MG/1
200 TABLET, FILM COATED ORAL TWICE DAILY
Qty: 360 | Refills: 1 | Status: DISCONTINUED | COMMUNITY
Start: 2021-07-21 | End: 2021-08-04

## 2021-08-04 RX ORDER — CHLORTHALIDONE 25 MG/1
25 TABLET ORAL
Refills: 0 | Status: DISCONTINUED | COMMUNITY
End: 2021-08-04

## 2021-08-23 ENCOUNTER — RESULT REVIEW (OUTPATIENT)
Age: 56
End: 2021-08-23

## 2021-08-23 ENCOUNTER — APPOINTMENT (OUTPATIENT)
Dept: HEMATOLOGY ONCOLOGY | Facility: CLINIC | Age: 56
End: 2021-08-23

## 2021-08-23 VITALS
RESPIRATION RATE: 20 BRPM | HEIGHT: 70 IN | DIASTOLIC BLOOD PRESSURE: 86 MMHG | OXYGEN SATURATION: 97 % | BODY MASS INDEX: 45.1 KG/M2 | HEART RATE: 84 BPM | TEMPERATURE: 97.5 F | WEIGHT: 315 LBS | SYSTOLIC BLOOD PRESSURE: 137 MMHG

## 2021-08-23 RX ORDER — MAGNESIUM OXIDE 241.3 MG/1000MG
400 TABLET ORAL TWICE DAILY
Qty: 120 | Refills: 3 | Status: ACTIVE | COMMUNITY
Start: 2021-08-23 | End: 1900-01-01

## 2021-08-27 ENCOUNTER — RX RENEWAL (OUTPATIENT)
Age: 56
End: 2021-08-27

## 2021-08-29 NOTE — HISTORY OF PRESENT ILLNESS
[Home] : at home, [unfilled] , at the time of the visit. [Other Location: e.g. 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 uncontrolled diabetes \par had a bed accident at home also injured  his knee\par        lost 68lb since on MediFast diet, gained 11 in the last 6 months, still doing well, going to the gym daily, swimming then gained all back, has neck pain, now passing kidney stones\par tried Victoza with Dr Butler had side effects \par        labs reviewed, A1c 9 on 12/2020 with PCP\par      \par        BP meds were adjusted by his PCP , will have hernia repair 12/17 also will see a vascular surgeon per pt his PCP advised to to pains in his legs while sitting \par        did not do US thyroid as advised, last US done 11/16 showed no nodules, so no biopsy was done by Dr Leach\par        body hair groing back, feels "great " per pt \par        started Medifast beginning of November 2016 , "Take shape for Life" \par        stopped insulin all togheter \par        type 2 DM for 10 years 2005 w/o known complications \par        STOPPED Glimepiride 4mg half a tab in am only restarted a week ago \par        4/4/17 STOPPED Metformin 1g bid \par        STOPPED Lantus 30units qhs \par \par on Tresiba 30units qhs \par \par        STOPPED Humalog 18-22......22.....22 per SS\par        forgets about 3 times a week to take his Humalog before his meals \par         Humalog sliding scale before each meal\par        for sugar under 80 do not take it\par         take 18 for breakfast....16 for lunch and 18 for supper\par        131-180 take 20units breakfast....18units for lunch....20units supper\par        181-230 take 22units for breakfast........20units for lunch and 22units for supper\par        231-280 take 24units breakfast........22 for lunch.......24units for supper etc\par        used to be on Byetta , stopped as his sugars were low per pt , Bydureon gave him lumps stooped July 2015\par         HgA1c was 8.8% 4/21/15, 7/15 was 9.0%, HgA1c 10.9% on 11/15, had one with Dr Metcalf a month ago around 8% per pt , 2/'16 HgA1c 8.4%, 5/16 was 8.5%\par        Checks BS none\par        lowest  fasting \par     during the day 200 -160 \par        Microvascular complications: \par        Nephropathy +microal/cr 83 (<30), repeated 83, EGFR 105 11/4/15\par        Neuropathy symptoms, denies \par        microfilament exam normal today \par        Retinopathy denies last eye exam 1/17 ago Dr Thomas \par        Macrovascular complications: \par        HTN at goal on present meds on Benicar\par        CAD/CVA denies\par        Lipids not at goal , was 105 , TG , 136, were 209 was 229 on with LFT's elevated AST 58, ALT 39, was 51 (<46) used to be on Crestor 5mg qd \par        TSH 10.24, FT4 1.3 on dose was increased from 225 to 250mcg since July 2015 , does not forget \par        takes it at 2am then goes back to sleep\par        11/4/15 TSH 8\par        2/16 TSH 0.88\par        5/16 TSH 1.8\par        CBC mild anemia Hg 12.8 stable \par        had hair loss patchy from his legs , seen Dermatholgy was told likely due to thyroid per pt , now resolved \par        also has chronic pretibial hyperpigmentation stasis.\par eye exam Dr Thomas 11/18 no DR per pt

## 2021-08-30 ENCOUNTER — APPOINTMENT (OUTPATIENT)
Dept: HEMATOLOGY ONCOLOGY | Facility: CLINIC | Age: 56
End: 2021-08-30

## 2021-08-30 ENCOUNTER — RESULT REVIEW (OUTPATIENT)
Age: 56
End: 2021-08-30

## 2021-08-30 VITALS
DIASTOLIC BLOOD PRESSURE: 83 MMHG | HEIGHT: 70 IN | RESPIRATION RATE: 16 BRPM | TEMPERATURE: 97.6 F | OXYGEN SATURATION: 95 % | WEIGHT: 315 LBS | HEART RATE: 92 BPM | SYSTOLIC BLOOD PRESSURE: 129 MMHG | BODY MASS INDEX: 45.1 KG/M2

## 2021-08-31 RX ORDER — CHLORTHALIDONE 25 MG/1
25 TABLET ORAL DAILY
Qty: 90 | Refills: 3 | Status: DISCONTINUED | COMMUNITY
Start: 2021-07-16 | End: 2021-08-31

## 2021-08-31 RX ORDER — ATENOLOL 50 MG/1
50 TABLET ORAL
Qty: 180 | Refills: 0 | Status: DISCONTINUED | COMMUNITY
Start: 2021-06-13 | End: 2021-08-31

## 2021-08-31 RX ORDER — ALBUTEROL SULFATE 90 UG/1
108 (90 BASE) INHALANT RESPIRATORY (INHALATION)
Refills: 0 | Status: ACTIVE | COMMUNITY
Start: 2021-04-30

## 2021-09-08 ENCOUNTER — APPOINTMENT (OUTPATIENT)
Dept: HEMATOLOGY ONCOLOGY | Facility: CLINIC | Age: 56
End: 2021-09-08

## 2021-09-08 ENCOUNTER — RESULT REVIEW (OUTPATIENT)
Age: 56
End: 2021-09-08

## 2021-09-08 VITALS
OXYGEN SATURATION: 96 % | RESPIRATION RATE: 16 BRPM | HEIGHT: 70 IN | HEART RATE: 89 BPM | SYSTOLIC BLOOD PRESSURE: 149 MMHG | WEIGHT: 315 LBS | BODY MASS INDEX: 45.1 KG/M2 | DIASTOLIC BLOOD PRESSURE: 81 MMHG | TEMPERATURE: 97.9 F

## 2021-09-13 ENCOUNTER — APPOINTMENT (OUTPATIENT)
Dept: ENDOCRINOLOGY | Facility: CLINIC | Age: 56
End: 2021-09-13
Payer: MEDICARE

## 2021-09-13 VITALS
OXYGEN SATURATION: 100 % | RESPIRATION RATE: 18 BRPM | WEIGHT: 315 LBS | HEART RATE: 62 BPM | DIASTOLIC BLOOD PRESSURE: 78 MMHG | HEIGHT: 70 IN | BODY MASS INDEX: 45.1 KG/M2 | SYSTOLIC BLOOD PRESSURE: 124 MMHG

## 2021-09-13 LAB — GLUCOSE BLDC GLUCOMTR-MCNC: 147

## 2021-09-13 PROCEDURE — 99214 OFFICE O/P EST MOD 30 MIN: CPT | Mod: 25

## 2021-09-13 PROCEDURE — 95251 CONT GLUC MNTR ANALYSIS I&R: CPT

## 2021-09-13 PROCEDURE — 82962 GLUCOSE BLOOD TEST: CPT

## 2021-09-13 RX ORDER — POTASSIUM CITRATE 10 MEQ/1
10 MEQ TABLET, EXTENDED RELEASE ORAL DAILY
Refills: 0 | Status: DISCONTINUED | COMMUNITY
End: 2021-09-13

## 2021-09-13 RX ORDER — DIAZEPAM 5 MG/1
5 TABLET ORAL
Qty: 30 | Refills: 0 | Status: DISCONTINUED | COMMUNITY
Start: 2021-06-03 | End: 2021-09-13

## 2021-09-13 RX ORDER — DULAGLUTIDE 0.75 MG/.5ML
0.75 INJECTION, SOLUTION SUBCUTANEOUS
Qty: 2 | Refills: 0 | Status: DISCONTINUED | COMMUNITY
Start: 2021-08-04 | End: 2021-09-13

## 2021-09-13 NOTE — HISTORY OF PRESENT ILLNESS
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 \par had a bed accident at home also injured  his knee\par        lost 68lb since on MediFast diet, gained 11 in the last 6 months, still doing well, going to the gym daily, swimming then gained all back, has neck pain, now passing kidney stones\par tried Victoza with Dr Butler had side effects \par        labs reviewed, A1c 9 on 12/2020 with PCP\par      \par        BP meds were adjusted by his PCP , will have hernia repair 12/17 also will see a vascular surgeon per pt his PCP advised to to pains in his legs while sitting \par        did not do US thyroid as advised, last US done 11/16 showed no nodules, so no biopsy was done by Dr Leach\par        body hair groing back, feels "great " per pt \par        started Medifast beginning of November 2016 , "Take shape for Life" \par        stopped insulin all togheter \par        type 2 DM for 10 years 2005 w/o known complications \par        STOPPED Glimepiride 4mg half a tab in am only restarted a week ago \par        4/4/17 STOPPED Metformin 1g bid \par        STOPPED Lantus 30units qhs \par \par on Tresiba 20units qhs \par Trulicity 0.75mg q week\par        STOPPED Humalog 18-22......22.....22 per SS\par        forgets about 3 times a week to take his Humalog before his meals \par         Humalog sliding scale before each meal\par        for sugar under 80 do not take it\par         take 18 for breakfast....16 for lunch and 18 for supper\par        131-180 take 20units breakfast....18units for lunch....20units supper\par        181-230 take 22units for breakfast........20units for lunch and 22units for supper\par        231-280 take 24units breakfast........22 for lunch.......24units for supper etc\par        used to be on Byetta , stopped as his sugars were low per pt , Bydureon gave him lumps stooped July 2015\par         HgA1c was 8.8% 4/21/15, 7/15 was 9.0%, HgA1c 10.9% on 11/15, had one with Dr Metcalf a month ago around 8% per pt , 2/'16 HgA1c 8.4%, 5/16 was 8.5%\par        Checks BS none\par        lowest  fasting \par     during the day 200 -160 \par        Microvascular complications: \par        Nephropathy +microal/cr 83 (<30), repeated 83, EGFR 105 11/4/15\par        Neuropathy symptoms, denies \par        microfilament exam normal today \par        Retinopathy denies last eye exam 1/17 ago Dr Thomas \par        Macrovascular complications: \par        HTN at goal on present meds on Benicar\par        CAD/CVA denies\par        Lipids not at goal , was 105 , TG , 136, were 209 was 229 on with LFT's elevated AST 58, ALT 39, was 51 (<46) used to be on Crestor 5mg qd \par        TSH 10.24, FT4 1.3 on dose was increased from 225 to 250mcg since July 2015 , does not forget \par        takes it at 2am then goes back to sleep\par        11/4/15 TSH 8\par        2/16 TSH 0.88\par        5/16 TSH 1.8\par        CBC mild anemia Hg 12.8 stable \par        had hair loss patchy from his legs , seen Dermatholgy was told likely due to thyroid per pt , now resolved \par        also has chronic pretibial hyperpigmentation stasis.\par eye exam Dr Thomas 11/18 no DR per pt

## 2021-09-13 NOTE — ASSESSMENT
[Diabetes Foot Care] : diabetes foot care [Long Term Vascular Complications] : long term vascular complications of diabetes [Importance of Diet and Exercise] : importance of diet and exercise to improve glycemic control, achieve weight loss and improve cardiovascular health [Retinopathy Screening] : Patient was referred to ophthalmology for retinopathy screening [Weight Loss] : weight loss

## 2021-09-13 NOTE — REVIEW OF SYSTEMS
[Joint Pain] : joint pain [Back Pain] : back pain [Negative] : Constitutional [Recent Weight Gain (___ Lbs)] : no recent weight gain [Recent Weight Loss (___ Lbs)] : no recent weight loss [FreeTextEntry1] : Gastrointestinal: nausea . passing kidney stones. \par Genitourinary:. hemathuria. \par Musculoskeletal: joint pain and back pain. \par Neurological: headaches. \par Psychiatric: insomnia. \par Endocrine: polydipsia. \par Constitutional, Eyes, ENT, Cardiovascular, Respiratory, Gastrointestinal, Genitourinary, Musculoskeletal, Integumentary, Neurological, Psychiatric, Endocrine and Heme/Lymph are otherwise negative.

## 2021-09-27 ENCOUNTER — APPOINTMENT (OUTPATIENT)
Dept: HEMATOLOGY ONCOLOGY | Facility: CLINIC | Age: 56
End: 2021-09-27
Payer: MEDICARE

## 2021-09-27 ENCOUNTER — RESULT REVIEW (OUTPATIENT)
Age: 56
End: 2021-09-27

## 2021-09-27 VITALS
HEIGHT: 70 IN | TEMPERATURE: 97.3 F | SYSTOLIC BLOOD PRESSURE: 145 MMHG | DIASTOLIC BLOOD PRESSURE: 88 MMHG | HEART RATE: 81 BPM | BODY MASS INDEX: 45.1 KG/M2 | WEIGHT: 315 LBS | RESPIRATION RATE: 16 BRPM | OXYGEN SATURATION: 97 %

## 2021-09-27 PROCEDURE — 99214 OFFICE O/P EST MOD 30 MIN: CPT

## 2021-09-27 NOTE — DISCUSSION/SUMMARY
[FreeTextEntry1] : Repeat mg level 1.3 from 0.9 after 2 runs in ER last week- he is taking po magnesium.  Will give 1 run of Mag today - refusing 2 runs.  Most likely hypomagnesium caused by chlorthalidone- referred to nephrologist Dr. Yung for further management of electrolytes- Case and plan discussed with Dr. Gibson.

## 2021-09-27 NOTE — CONSULT LETTER
[Dear  ___] : Dear  [unfilled], [Consult Letter:] : I had the pleasure of evaluating your patient, [unfilled]. [Please see my note below.] : Please see my note below. [Consult Closing:] : Thank you very much for allowing me to participate in the care of this patient.  If you have any questions, please do not hesitate to contact me. [Sincerely,] : Sincerely, [DrPerez  ___] : Dr. PINEDA [DrPerez ___] : Dr. PINEDA [FreeTextEntry3] : Ariana Gibson MD\par [facility] [place] [facility]\par

## 2021-09-27 NOTE — HISTORY OF PRESENT ILLNESS
[de-identified] : Patient is a 54 year old male being seen for history of PE. He was diagnosed with PE 1/2010,  placed on enoxaparin transitioned to warfarin. PE felt to be unprovoked and hypercoag work up was negative for prothrombin and factor V mutation and antiphospholipid syndrome.  Ferritin level was also low.  Mr Blackwood was morbidly obese in 2010 weighing 350lbs.  He has lost over 100lbs in last 6 months. He has a history of HTN, atrial fibrillation and diabetes.  He has since been able to come off several medications due to weight loss.  He continues on warfarin with no bleeding or bruising and his INR is stable.  [de-identified] : Patient here today for follow-up for history of PE, on pradaxa. Patient had a colonoscopy and EGD last Tuesday, polyps came back negative. Patient had kidney stone surgery back in February.

## 2021-09-27 NOTE — ASSESSMENT
[FreeTextEntry1] : Patient with h/o unprovoked pulmonary embolism 2010 followed by chronic atrial fibrillation 2011 on chronic anticoagulation. \par Patient changed 2017 to dabigatran from warfarin - tolerates well- no evidence of bleeding\par Continue dabigatran \par Repeat iron parameters - last ferritin 52- repeat today, HGB 13.4 today \par GI work up - colonoscopy - 2013, EGD 6/16- needs follow up\par Leukocytosis- resolved\par \par Having two teeth extracted (wisdom teeth) this month- instructed to hold Pradaxa for 24 hrs prior to extractions, may resume that night per oral surgeon if bleeding is minimal - up to discretion of oral surgeon dependant upon bleeding- pt verbalized understanding and aware to call office with any questions or concerns.  Outside labs reviewed with patient, CBC stable.  He has a follow up in Feb with endocrinologist for his DM and is having a colonoscopy in Feb- he is to follow up in April here in office.  \par \par 4/12/2021\par Hg decline, underwent EGD and colonoscopy April 2021 - polyps removed by Dr. Nieto\par Repeat ferritin today , if low will consider camera capsule study\par Continue oral iron \par Repeat CBC in 3 month\par \par 9/27/2021\par Patient with persistent hypomagnesemia secondary to diuretic\par Off meds now magnesium better\par  - takes iron pills- ferritin 92 \par - Hg stable 12.1 today\par RTC 1 year \par \par \par

## 2021-09-27 NOTE — REVIEW OF SYSTEMS
[Shortness Of Breath] : shortness of breath [Joint Pain] : joint pain [Difficulty Walking] : difficulty walking [Insomnia] : insomnia [Easy Bleeding] : a tendency for easy bleeding [Easy Bruising] : a tendency for easy bruising [Negative] : Allergic/Immunologic [Fever] : no fever [Fatigue] : no fatigue [Recent Change In Weight] : ~T no recent weight change [Eye Pain] : no eye pain [Vision Problems] : no vision problems [Dysphagia] : no dysphagia [Hoarseness] : no hoarseness [Chest Pain] : no chest pain [Palpitations] : no palpitations [Lower Ext Edema] : no lower extremity edema [Cough] : no cough [Abdominal Pain] : no abdominal pain [Constipation] : no constipation [Diarrhea] : no diarrhea [Dysuria] : no dysuria [Muscle Pain] : no muscle pain [Skin Rash] : no skin rash [Dizziness] : no dizziness [Anxiety] : no anxiety [Depression] : no depression [Hot Flashes] : no hot flashes [Muscle Weakness] : no muscle weakness [FreeTextEntry9] : Knees, slip disc in back  [de-identified] : on blood thinners

## 2021-09-27 NOTE — PHYSICAL EXAM
[Fully active, able to carry on all pre-disease performance without restriction] : Status 0 - Fully active, able to carry on all pre-disease performance without restriction [Obese] : obese [Normal] : affect appropriate [de-identified] : right flank - lipoma removed, no bleeding [de-identified] : trace edema, varicose veins

## 2021-09-29 ENCOUNTER — NON-APPOINTMENT (OUTPATIENT)
Age: 56
End: 2021-09-29

## 2021-10-04 ENCOUNTER — RX RENEWAL (OUTPATIENT)
Age: 56
End: 2021-10-04

## 2021-10-06 ENCOUNTER — APPOINTMENT (OUTPATIENT)
Dept: CARDIOLOGY | Facility: CLINIC | Age: 56
End: 2021-10-06
Payer: MEDICARE

## 2021-10-06 VITALS
DIASTOLIC BLOOD PRESSURE: 90 MMHG | WEIGHT: 315 LBS | OXYGEN SATURATION: 98 % | BODY MASS INDEX: 49.5 KG/M2 | SYSTOLIC BLOOD PRESSURE: 142 MMHG | HEART RATE: 88 BPM

## 2021-10-06 PROCEDURE — 99214 OFFICE O/P EST MOD 30 MIN: CPT

## 2021-10-06 RX ORDER — INSULIN DEGLUDEC INJECTION 200 U/ML
200 INJECTION, SOLUTION SUBCUTANEOUS
Qty: 3 | Refills: 1 | Status: DISCONTINUED | COMMUNITY
Start: 2019-06-03 | End: 2021-10-06

## 2021-10-06 NOTE — ASSESSMENT
[FreeTextEntry1] : EKG 7/14/2021.  Atrial fibrillation.  Controlled.  R S waves V1 through V3.  No acute changes.\par EKG from hospital 1/12/2021.  Atrial fibrillation, controlled.  R S waves in lead III and aVF.  No acute changes.\par EKG 9/1/2020.  Atrial fibrillation.  Controlled.  R S waves V1 through V3.  Due to body habitus.  No acute changes.\par EKG atrial fibrillation.  Controlled.  Otherwise within normal limits.

## 2021-10-06 NOTE — PHYSICAL EXAM
[Well Developed] : well developed [Well Nourished] : well nourished [No Acute Distress] : no acute distress [Obese] : obese [Normal Conjunctiva] : normal conjunctiva [Normal Venous Pressure] : normal venous pressure [No Carotid Bruit] : no carotid bruit [Normal S1, S2] : normal S1, S2 [No Murmur] : no murmur [No Rub] : no rub [No Gallop] : no gallop [Clear Lung Fields] : clear lung fields [Good Air Entry] : good air entry [No Respiratory Distress] : no respiratory distress  [Soft] : abdomen soft [Non Tender] : non-tender [No Masses/organomegaly] : no masses/organomegaly [Normal Bowel Sounds] : normal bowel sounds [Normal Gait] : normal gait [No Cyanosis] : no cyanosis [No Clubbing] : no clubbing [No Varicosities] : no varicosities [No Rash] : no rash [No Skin Lesions] : no skin lesions [Moves all extremities] : moves all extremities [No Focal Deficits] : no focal deficits [Normal Speech] : normal speech [Alert and Oriented] : alert and oriented [Normal memory] : normal memory [de-identified] : 1+ chronic bilateral edema.

## 2021-10-06 NOTE — CARDIOLOGY SUMMARY
[de-identified] : 7/14/21 Atrial fibrillation, controlled [de-identified] : \par Stress MIBI 3/10/17. Baseline atrial fibrillation. No ischemia. EF 65%. Gutierrez stage III. 9-1/2 minutes. 11 METs. 85%.  [de-identified] : CT angiogram 10/23/18. No obvious pulmonary embolism. Dilated ascending thoracic aorta, 4.3 cm (3.9 in 2010). [de-identified] : Lower extremity study 7/13/17. Negative.

## 2021-10-06 NOTE — REVIEW OF SYSTEMS
[Negative] : Heme/Lymph [FreeTextEntry5] : See HPI [FreeTextEntry6] : Sleep apnea has returned with weight gain. [FreeTextEntry7] : Rare GERD.  History of diverticulosis. [FreeTextEntry8] : Recurrent kidney stones.  Dr. Ronquillo.  Status post lithotripsy.  ED, treated. [FreeTextEntry9] : Bilateral knee pain.  Improved.  Consultation with Dr. Baudilio Haq. [de-identified] : History left-sided sciatica.  Also right-sided sciatica.

## 2021-10-06 NOTE — REVIEW OF SYSTEMS
[Dyspnea on exertion] : dyspnea during exertion [Lower Ext Edema] : lower extremity edema [Joint Pain] : joint pain [Fever] : no fever [Weight Gain (___ Lbs)] : no recent weight gain [Weight Loss (___ Lbs)] : no recent weight loss [SOB] : no shortness of breath [Chest Discomfort] : no chest discomfort [Palpitations] : no palpitations [Syncope] : no syncope [Cough] : no cough [Dizziness] : no dizziness [Confusion] : no confusion was observed [Easy Bleeding] : no tendency for easy bleeding [Easy Bruising] : no tendency for easy bruising

## 2021-10-06 NOTE — HISTORY OF PRESENT ILLNESS
[FreeTextEntry1] : This 56 year-old male patient presents for cardiovascular evaluation.\par \par His problem list is as noted above.\par \par Since his last examination 6 months ago he reports some medical interactions.  He had a successful urological stent procedure for kidney stones.  There were no cardiac complications.\par \par The patient has been more active.  He has been struggling with his weight.  He is enthusiastic now about working again on his exercise and diet.  He has had follow-up with his hematologist and endocrinologist.  He has seen his primary care physician as well.  He anticipates comprehensive blood work in the near future.\par \par There have been no acute symptoms of shortness of breath, chest discomfort, palpitation, lightheadedness, fainting.

## 2021-10-06 NOTE — CARDIOLOGY SUMMARY
[de-identified] : EKG 7/14/2021.  Atrial fibrillation.  Controlled.  R S waves V1 through V3.  No acute changes. [de-identified] : Stress MIBI 3/10/17. Baseline atrial fibrillation. No ischemia. EF 65%. Gutierrez stage III.  9-1/2 minutes. 11 METs. 85%. \par \par  [de-identified] : Echo 1/15/2015.  A.  F.  Normal LV function.  EF 65%.  LVH.  Normal RV.  Dilated RA/LA.  Normal valves. [de-identified] : Chest CT 7/27/2021.  Aneurysmal dilatation of ascending thoracic aorta, 4.2 cm.\par CT angiogram 10/23/18. No obvious pulmonary embolism. Dilated ascending thoracic  aorta, 4.3 cm (3.9 in 2010).\par \par  [de-identified] :  Lower extremitystudy 7/13/17. Negative. \par  [de-identified] : PFT 11/10/2015. Mild restrictive impairment, mild reduction in diffusion \par Sleep study 11/15/14. 39 apneas. Apnea index 43.3 per hour.\par

## 2021-10-06 NOTE — REASON FOR VISIT
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 - Started chlorthalidone 25mg daily.\par Chlorthalidone was subsequently stopped for low Mg+ which required supplementation\par He has problem with severe arthritis of his knees, poor mobility and discomfort\par \par He denies chest pain, SOB, palpitations, dizziness or syncope, headaches.

## 2021-10-06 NOTE — DISCUSSION/SUMMARY
[FreeTextEntry1] : Brief recommendations and follow-up: (see above for details)\par \par The patient's overall cardiovascular status is stable however he does need attention to risk factor reduction.\par He will take his blood pressure as noted and will get back to me.  If necessary adjust medications.\par He will also bring his home blood pressure machine to his next doctor's visit to see if it is calibrated properly.\par Noncontrast CT of the chest will be ordered to follow-up on his aneurysm.\par Therapeutic lifestyle treatment emphasized.\par Atrial fibrillation is stable.  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

## 2021-10-13 ENCOUNTER — APPOINTMENT (OUTPATIENT)
Dept: CARDIOLOGY | Facility: CLINIC | Age: 56
End: 2021-10-13
Payer: MEDICARE

## 2021-10-13 LAB
ALBUMIN SERPL ELPH-MCNC: 4.4 G/DL
ALP BLD-CCNC: 84 U/L
ALT SERPL-CCNC: 19 U/L
ANION GAP SERPL CALC-SCNC: 13 MMOL/L
AST SERPL-CCNC: 20 U/L
BASOPHILS # BLD AUTO: 0.15 K/UL
BASOPHILS NFR BLD AUTO: 1.9 %
BILIRUB SERPL-MCNC: 0.4 MG/DL
BUN SERPL-MCNC: 15 MG/DL
CALCIUM SERPL-MCNC: 9.6 MG/DL
CHLORIDE SERPL-SCNC: 100 MMOL/L
CO2 SERPL-SCNC: 24 MMOL/L
CREAT SERPL-MCNC: 0.96 MG/DL
EOSINOPHIL # BLD AUTO: 0.24 K/UL
EOSINOPHIL NFR BLD AUTO: 3 %
GLUCOSE SERPL-MCNC: 261 MG/DL
HCT VFR BLD CALC: 37 %
HGB BLD-MCNC: 12.4 G/DL
IMM GRANULOCYTES NFR BLD AUTO: 0.4 %
INR PPP: 1.3 RATIO
LYMPHOCYTES # BLD AUTO: 1.17 K/UL
LYMPHOCYTES NFR BLD AUTO: 14.6 %
MAGNESIUM SERPL-MCNC: 1.6 MG/DL
MAN DIFF?: NORMAL
MCHC RBC-ENTMCNC: 31.3 PG
MCHC RBC-ENTMCNC: 33.5 GM/DL
MCV RBC AUTO: 93.4 FL
MONOCYTES # BLD AUTO: 0.6 K/UL
MONOCYTES NFR BLD AUTO: 7.5 %
NEUTROPHILS # BLD AUTO: 5.83 K/UL
NEUTROPHILS NFR BLD AUTO: 72.6 %
PLATELET # BLD AUTO: 306 K/UL
POTASSIUM SERPL-SCNC: 4.5 MMOL/L
PROT SERPL-MCNC: 7.2 G/DL
PT BLD: 15.1 SEC
RBC # BLD: 3.96 M/UL
RBC # FLD: 14 %
SODIUM SERPL-SCNC: 138 MMOL/L
WBC # FLD AUTO: 8.02 K/UL

## 2021-10-13 PROCEDURE — 99214 OFFICE O/P EST MOD 30 MIN: CPT

## 2021-10-13 PROCEDURE — 36415 COLL VENOUS BLD VENIPUNCTURE: CPT

## 2021-10-13 RX ORDER — DULAGLUTIDE 1.5 MG/.5ML
1.5 INJECTION, SOLUTION SUBCUTANEOUS
Qty: 3 | Refills: 1 | Status: DISCONTINUED | COMMUNITY
Start: 2021-08-04 | End: 2021-10-13

## 2021-10-14 VITALS
DIASTOLIC BLOOD PRESSURE: 94 MMHG | SYSTOLIC BLOOD PRESSURE: 136 MMHG | HEART RATE: 90 BPM | OXYGEN SATURATION: 97 % | BODY MASS INDEX: 49.22 KG/M2 | WEIGHT: 315 LBS

## 2021-10-14 NOTE — REVIEW OF SYSTEMS
[Lower Ext Edema] : lower extremity edema [Joint Pain] : joint pain [Weight Loss (___ Lbs)] : [unfilled] ~Ulb weight loss [Fever] : no fever [Weight Gain (___ Lbs)] : no recent weight gain [SOB] : no shortness of breath [Chest Discomfort] : no chest discomfort [Palpitations] : no palpitations [Syncope] : no syncope [Cough] : no cough [Dizziness] : no dizziness [Confusion] : no confusion was observed [Easy Bleeding] : no tendency for easy bleeding [Easy Bruising] : no tendency for easy bruising

## 2021-10-14 NOTE — REASON FOR VISIT
[Symptom and Test Evaluation] : symptom and test evaluation [Hypertension] : hypertension [Spouse] : spouse [FreeTextEntry1] : Comes today for f/u BP and edema check.  3 day course of lasix given for edema.  BP elevated was to increase labetalol dose but upon going home he was already on the dose of 2 tabs of 200mg BID\par \par  Last week Called office  with c/o elevated BP readings and increased edema with weeping area\par  In July elevated BP readings, 171/120 160/110s. - Started chlorthalidone 25mg daily.\par Chlorthalidone was subsequently stopped for low Mg+ which required supplementation\par He has problem with severe arthritis of his knees, poor mobility and discomfort\par \par He denies chest pain, SOB, palpitations, dizziness or syncope, headaches.\par \par Scheduled for cataract procedure requests pre procedure labs today with chem check

## 2021-10-14 NOTE — CARDIOLOGY SUMMARY
[de-identified] : 7/14/21 Atrial fibrillation, controlled [de-identified] : \par Stress MIBI 3/10/17. Baseline atrial fibrillation. No ischemia. EF 65%. Gutierrez stage III. 9-1/2 minutes. 11 METs. 85%.  [de-identified] : CT angiogram 10/23/18. No obvious pulmonary embolism. Dilated ascending thoracic aorta, 4.3 cm (3.9 in 2010). [de-identified] : Lower extremity study 7/13/17. Negative.

## 2021-10-14 NOTE — PHYSICAL EXAM
[No Acute Distress] : no acute distress [Irregularly Irregular] : irregularly irregular [Clear Lung Fields] : clear lung fields [Good Air Entry] : good air entry [No Respiratory Distress] : no respiratory distress  [Normal Gait] : normal gait [No Rash] : no rash [Moves all extremities] : moves all extremities [No Focal Deficits] : no focal deficits [Normal Speech] : normal speech [Alert and Oriented] : alert and oriented [Normal memory] : normal memory [Obese] : obese [de-identified] : irreg [de-identified] : 1+ edema LEs (improvement from last week)

## 2021-10-19 LAB — APTT BLD: 43.1 SEC

## 2021-10-20 ENCOUNTER — APPOINTMENT (OUTPATIENT)
Dept: GASTROENTEROLOGY | Facility: CLINIC | Age: 56
End: 2021-10-20
Payer: MEDICARE

## 2021-10-20 VITALS
OXYGEN SATURATION: 98 % | SYSTOLIC BLOOD PRESSURE: 140 MMHG | TEMPERATURE: 97.8 F | DIASTOLIC BLOOD PRESSURE: 90 MMHG | BODY MASS INDEX: 45.1 KG/M2 | HEIGHT: 70 IN | HEART RATE: 117 BPM | WEIGHT: 315 LBS

## 2021-10-20 PROCEDURE — 99214 OFFICE O/P EST MOD 30 MIN: CPT

## 2021-10-20 NOTE — ASSESSMENT
[FreeTextEntry1] : ?  Hemorrhoidal issue with change in bowel habits:  Stool studies / Anusol. CT scan if post prandial lower abdominal burning / pressure  continues

## 2021-10-20 NOTE — HISTORY OF PRESENT ILLNESS
[de-identified] : Presents with rectal pressure and  burning after meals x  several months.  Additionally c/o loose / semi-solid  stools.  Saw Dr. Levine ~ 4 months ago for hemorrhoids\par \par Evaluated 12/2020 for GERD and H/H slightly decreased at 13/40. Admitted to frequent Heartburn. EGD / colonoscopy ( 4/2021) revealed gastritis / hemorrhoids / diverticulosis / rectal polyps ( hyperplastic) \par \par Denies BRBPR / melena / hematemesis\par \par -EGD in 7/2018 for dysphagia was unremarkable.\par  \par Evaluated 2/2018  for follow up for rectal bleeding / itching / burning and pain (on Pradaxa).  Had purposefully lost weight with Medifast / exercise. \par \par Evaluated  in 2/2015 for self limited abdominal pain / nausea. Work up was unrevealing ( including CAT scan revealing unchanged 1.1 cm liver lesion, nephrolithiasis / renal cyst) . Sonogram reportedly showed GB sludge. Referred to Dr. Loera who apparently did not believe the GB was the problem.\par \par - EGD 12/2014 ( reflux) revealed a hyperplastic gastric polyp. \par -Colonoscopy for diarrhea on 10/2013 ( non specific inflam changes ./ adenoma / c. diff negative ) and on 5/29/12 ( diverticulosis and hemorrhoids / Random biopsies were normal /Stool studies were notable for C.diff which was treated with Flagyl) .\par \par The patient also has a h/o diverticulitis, last attack in 2008. CAT scan in 2012 for abnormal liver enzymes ( AST =79) and a liver lesion seen on a CAt scan in 2008 revealed fatty liver and an unchanged liver lesion ( c/w 2008) consistent with a cyst or hamartoma. \par \par -Colonoscopy 12/2010 revealed diverticulosis, hemorrhoids and a benign polyp. \par -EGD in 2010 / 2005 revealed gastritis and a small HH.\par -Colonoscopy in 2008 revealed hemorrhoids, diverticulosis and a benign polyp. Similar findings were noted at a colonoscopy in 2006 / 2004. \par

## 2021-10-20 NOTE — PHYSICAL EXAM
[Neck Appearance] : the appearance of the neck was normal [] : no respiratory distress [Apical Impulse] : the apical impulse was normal [Abdomen Soft] : soft [Abnormal Walk] : normal gait [Skin Color & Pigmentation] : normal skin color and pigmentation [No Focal Deficits] : no focal deficits [Oriented To Time, Place, And Person] : oriented to person, place, and time [FreeTextEntry1] : deferred

## 2021-10-25 ENCOUNTER — NON-APPOINTMENT (OUTPATIENT)
Age: 56
End: 2021-10-25

## 2021-10-26 LAB
C DIFF TOX GENS STL QL NAA+PROBE: NORMAL
CDIFF BY PCR: NOT DETECTED
G LAMBLIA AG STL QL: NORMAL

## 2021-10-28 ENCOUNTER — APPOINTMENT (OUTPATIENT)
Dept: NEPHROLOGY | Facility: CLINIC | Age: 56
End: 2021-10-28

## 2021-10-28 LAB
BACTERIA STL CULT: NORMAL
DEPRECATED O AND P PREP STL: NORMAL

## 2021-10-29 ENCOUNTER — APPOINTMENT (OUTPATIENT)
Dept: CARDIOLOGY | Facility: CLINIC | Age: 56
End: 2021-10-29
Payer: MEDICARE

## 2021-10-29 ENCOUNTER — NON-APPOINTMENT (OUTPATIENT)
Age: 56
End: 2021-10-29

## 2021-10-29 LAB
CALPROTECTIN FECAL: 491 UG/G
LACTOFERRIN STL-MCNC: 9.28

## 2021-10-29 PROCEDURE — 93000 ELECTROCARDIOGRAM COMPLETE: CPT

## 2021-10-29 NOTE — PHYSICAL EXAM
[No Acute Distress] : no acute distress [Irregularly Irregular] : irregularly irregular [Moves all extremities] : moves all extremities [No Focal Deficits] : no focal deficits [Normal Speech] : normal speech [Alert and Oriented] : alert and oriented [Normal memory] : normal memory

## 2021-10-29 NOTE — REASON FOR VISIT
[FreeTextEntry1] : Mr. Blackwood presents for electrocardiogram. He is scheduled for cataract surgery on 11/9/21.  He denies chest pain, SOB, palpitations, dizziness or syncope.\par

## 2021-10-29 NOTE — REVIEW OF SYSTEMS
[Fever] : no fever [Chills] : no chills [Dyspnea on exertion] : not dyspnea during exertion [Chest Discomfort] : no chest discomfort [Palpitations] : no palpitations [Syncope] : no syncope [Easy Bleeding] : no tendency for easy bleeding [Easy Bruising] : no tendency for easy bruising

## 2021-10-31 LAB — PANCREATIC ELASTASE, FECAL: 222

## 2021-11-10 ENCOUNTER — RESULT REVIEW (OUTPATIENT)
Age: 56
End: 2021-11-10

## 2021-11-10 ENCOUNTER — NON-APPOINTMENT (OUTPATIENT)
Age: 56
End: 2021-11-10

## 2021-12-16 ENCOUNTER — RESULT REVIEW (OUTPATIENT)
Age: 56
End: 2021-12-16

## 2021-12-16 ENCOUNTER — APPOINTMENT (OUTPATIENT)
Dept: NEPHROLOGY | Facility: CLINIC | Age: 56
End: 2021-12-16
Payer: MEDICARE

## 2021-12-16 VITALS
OXYGEN SATURATION: 99 % | TEMPERATURE: 97.9 F | HEIGHT: 70 IN | WEIGHT: 315 LBS | BODY MASS INDEX: 45.1 KG/M2 | HEART RATE: 101 BPM | SYSTOLIC BLOOD PRESSURE: 126 MMHG | DIASTOLIC BLOOD PRESSURE: 70 MMHG

## 2021-12-16 PROCEDURE — 99204 OFFICE O/P NEW MOD 45 MIN: CPT

## 2021-12-16 RX ORDER — BUDESONIDE AND FORMOTEROL FUMARATE DIHYDRATE 80; 4.5 UG/1; UG/1
80-4.5 AEROSOL RESPIRATORY (INHALATION)
Refills: 0 | Status: DISCONTINUED | COMMUNITY
Start: 2021-04-30 | End: 2021-12-16

## 2021-12-16 RX ORDER — AZITHROMYCIN 250 MG/1
250 TABLET, FILM COATED ORAL DAILY
Qty: 10 | Refills: 0 | Status: DISCONTINUED | COMMUNITY
Start: 2021-10-29 | End: 2021-12-16

## 2021-12-16 RX ORDER — GARLIC 400 MG
400 TABLET, DELAYED RELEASE (ENTERIC COATED) ORAL DAILY
Refills: 0 | Status: DISCONTINUED | COMMUNITY
Start: 2021-08-31 | End: 2021-12-16

## 2021-12-16 NOTE — PHYSICAL EXAM
[General Appearance - Alert] : alert [General Appearance - In No Acute Distress] : in no acute distress [Sclera] : the sclera and conjunctiva were normal [Extraocular Movements] : extraocular movements were intact [Outer Ear] : the ears and nose were normal in appearance [Hearing Threshold Finger Rub Not Pepin] : hearing was normal [Neck Appearance] : the appearance of the neck was normal [Exaggerated Use Of Accessory Muscles For Inspiration] : no accessory muscle use [Apical Impulse] : the apical impulse was normal [Heart Sounds] : normal S1 and S2 [Bowel Sounds] : normal bowel sounds [Abdomen Soft] : soft [No CVA Tenderness] : no ~M costovertebral angle tenderness [No Spinal Tenderness] : no spinal tenderness [Abnormal Walk] : normal gait [Skin Color & Pigmentation] : normal skin color and pigmentation [] : no rash [Cranial Nerves] : cranial nerves 2-12 were intact [No Focal Deficits] : no focal deficits [Oriented To Time, Place, And Person] : oriented to person, place, and time [Impaired Insight] : insight and judgment were intact [FreeTextEntry1] : +ve edema

## 2021-12-16 NOTE — REVIEW OF SYSTEMS
[Lower Ext Edema] : lower extremity edema [Limb Pain] : limb pain [Fever] : no fever [Chills] : no chills [Eye Pain] : no eye pain [Red Eyes] : eyes not red [Earache] : no earache [Loss Of Hearing] : no hearing loss [Heart Rate Is Slow] : the heart rate was not slow [Heart Rate Is Fast] : the heart rate was not fast [Shortness Of Breath] : no shortness of breath [Wheezing] : no wheezing [Abdominal Pain] : no abdominal pain [Vomiting] : no vomiting [Dysuria] : no dysuria [Incontinence] : no incontinence [Skin Lesions] : no skin lesions [Skin Wound] : no skin wound [Confused] : no confusion [Convulsions] : no convulsions [Suicidal] : not suicidal

## 2021-12-16 NOTE — ASSESSMENT
[FreeTextEntry1] : Cardiology note reviewed\par Endo note reviewed\par PCP note reviewed\par Bence Fitch reviewed\par UA reviewed\par CBC reviewed\par CMP reviewed\par \par \par CKD stage 1 2/2?\par - Cr 0.97,however UA done inSep showed 1+protein - \par - etiology of proteinuria unclear, multiple possibilities, likely indirectly related to obesity\par - is diabetic and appropriately on arb ( losartan 50mg)\par - will check UPC\par - rec starting SGLT2 inh - will see Dr. Boyle next week\par \par Obesity\par - spent >50min discussing consequences of obesity and importance of being evaluated for bariatric surgery\par - explained relationship between NICOLE and obesity, HTN and obesity, DM and obesity, joint disease and obesity, Afib and obesity, hypercoagulability and obesity\par - pt agrees to be seen by bariatrics, referral given\par \par NICOLE\par -not clear if mask is fitting or settings correct, has not seen pulm in years, admonished to f/u with Dr. Moore\par \par Nephrolithiasis\par - recurrent\par - recalls "calcium" and  "uric acid"\par - check 24 hour urine analyisis\par - cont allopurinol\par \par Hypomag\par - most likely 22 to PPI\par d/c dexilant, try H2 blocker\par d/c Mag supplement\par \par f/u  in 2-3 months\par \par \par

## 2021-12-16 NOTE — HISTORY OF PRESENT ILLNESS
[FreeTextEntry1] : 55 yo retired  with [employer]; DM x 14 yrs, HTN, nephrolithiasis, former tobacco use ( quit 32 yrs ago), NICOLE ( doesn’t sleep well, was previously followed by Dr. Moore), DVT ~ , on pradaxa, referred for combination of FHx of PKD, nephrolithiasis and hypomag\par \par struggling with myriad of isues related to aforementioned, gerd, arthralgias, back pain, poor sleep, diff losing weight, swelling....\par \par FHx; +ve PKD ( father  at 59), sister PKD\par Hab: former smoker\par \par Retired \par \par

## 2022-01-04 ENCOUNTER — NON-APPOINTMENT (OUTPATIENT)
Age: 57
End: 2022-01-04

## 2022-01-06 ENCOUNTER — RESULT REVIEW (OUTPATIENT)
Age: 57
End: 2022-01-06

## 2022-01-06 ENCOUNTER — APPOINTMENT (OUTPATIENT)
Dept: ENDOCRINOLOGY | Facility: CLINIC | Age: 57
End: 2022-01-06
Payer: MEDICARE

## 2022-01-06 ENCOUNTER — APPOINTMENT (OUTPATIENT)
Dept: HEMATOLOGY ONCOLOGY | Facility: CLINIC | Age: 57
End: 2022-01-06

## 2022-01-06 VITALS
SYSTOLIC BLOOD PRESSURE: 143 MMHG | BODY MASS INDEX: 45.1 KG/M2 | OXYGEN SATURATION: 96 % | HEART RATE: 92 BPM | HEIGHT: 70 IN | TEMPERATURE: 97.5 F | DIASTOLIC BLOOD PRESSURE: 87 MMHG | WEIGHT: 315 LBS | RESPIRATION RATE: 16 BRPM

## 2022-01-06 VITALS — WEIGHT: 315 LBS | BODY MASS INDEX: 45.1 KG/M2 | HEIGHT: 70 IN

## 2022-01-06 PROCEDURE — 99215 OFFICE O/P EST HI 40 MIN: CPT | Mod: 95

## 2022-01-06 PROCEDURE — G0447 BEHAVIOR COUNSEL OBESITY 15M: CPT | Mod: 59,95

## 2022-01-06 RX ORDER — BLOOD-GLUCOSE METER
W/DEVICE EACH MISCELLANEOUS
Qty: 1 | Refills: 0 | Status: DISCONTINUED | COMMUNITY
Start: 2019-05-02 | End: 2022-01-06

## 2022-01-06 RX ORDER — LANCETS 33 GAUGE
EACH MISCELLANEOUS
Qty: 120 | Refills: 2 | Status: DISCONTINUED | COMMUNITY
Start: 2019-05-02 | End: 2022-01-06

## 2022-01-06 RX ORDER — BLOOD SUGAR DIAGNOSTIC
STRIP MISCELLANEOUS TWICE DAILY
Qty: 120 | Refills: 2 | Status: DISCONTINUED | COMMUNITY
Start: 2019-05-02 | End: 2022-01-06

## 2022-01-06 NOTE — DISCUSSION/SUMMARY
[FreeTextEntry1] : pt received cortizone shot \par received Humalog Rx and got 10units SC x3 then BS went down to 300\par this am and today [id number] will try Humalog 7units actid only if BS before meal >200\par check sugars before each meal and bedtime\par \par before meals your sugars  should be between  \par \par 1-2hrs after you start eating should be no more than 180\par \par HgA1c <7%\par \par Call us for any sugar under 70s or persistent highs 200s and above \par \par I called pt next day to check feeling good BS 200s, will increase Humalog from 5 to 7actid

## 2022-01-11 ENCOUNTER — NON-APPOINTMENT (OUTPATIENT)
Age: 57
End: 2022-01-11

## 2022-01-13 ENCOUNTER — NON-APPOINTMENT (OUTPATIENT)
Age: 57
End: 2022-01-13

## 2022-01-18 ENCOUNTER — APPOINTMENT (OUTPATIENT)
Dept: CARDIOLOGY | Facility: CLINIC | Age: 57
End: 2022-01-18
Payer: MEDICARE

## 2022-01-18 ENCOUNTER — NON-APPOINTMENT (OUTPATIENT)
Age: 57
End: 2022-01-18

## 2022-01-18 VITALS
HEIGHT: 70 IN | HEART RATE: 86 BPM | WEIGHT: 315 LBS | SYSTOLIC BLOOD PRESSURE: 138 MMHG | DIASTOLIC BLOOD PRESSURE: 83 MMHG | BODY MASS INDEX: 45.1 KG/M2 | TEMPERATURE: 97.9 F | OXYGEN SATURATION: 95 %

## 2022-01-18 PROCEDURE — 93000 ELECTROCARDIOGRAM COMPLETE: CPT | Mod: NC

## 2022-01-18 PROCEDURE — 99215 OFFICE O/P EST HI 40 MIN: CPT

## 2022-01-18 NOTE — DISCUSSION/SUMMARY
[FreeTextEntry1] : Brief recommendations and follow-up: (see above for details)\par \par The patient remains with a considerable cardiovascular and medical problem list.\par As noted he is anticipating shockwave lithotripsy for his current kidney stones and hematuria.\par The patient's overall cardiovascular status is compensated and he may proceed.\par Patient was instructed to take his medications with a sip of water preprocedure.\par As noted anticoagulation is desirable but if necessary for the procedure, and may be held for 1 to 2 days prior.  Other medication should continue.\par No overt heart failure or coronary artery disease.\par Atrial fibrillation is under control.\par Today's visit 45 minutes.\par Next routine visit 6 months.

## 2022-01-18 NOTE — REVIEW OF SYSTEMS
[FreeTextEntry5] : See HPI [FreeTextEntry6] : Sleep apnea has returned with weight gain. [FreeTextEntry7] : Rare GERD.  History of diverticulosis. [FreeTextEntry8] : Recurrent kidney stones and hematuria.  Dr. Ronquillo.  Status post lithotripsy.  Anticipating repeat procedure.  ED, treated. [FreeTextEntry9] : Bilateral knee pain.  Improved.  Consultation with Dr. Baudilio Haq. [de-identified] : Recurrent left-sided sciatica.  Also episodic right-sided sciatica.

## 2022-01-18 NOTE — HISTORY OF PRESENT ILLNESS
[FreeTextEntry1] : This 56 year-old male patient presents for cardiovascular evaluation.\par \par His problem list is as noted above.\par \par Since his last examination 6 months ago he has had some medical interactions.\par \par The patient has been dealing with recurrent kidney stones.  He has had considerable hematuria.  He is anticipating shockwave lithotripsy on February 11, to be coordinated by Dr. Ronquillo.\par \par He has also had additional evaluation with his endocrinologist, nephrologist, hematologist.\par \par He has had adjustment of his diabetes medications.  Multiple reports were reviewed.\par \par Patient otherwise feels his cardiovascular status is stable.  No acute symptoms of shortness of breath, chest discomfort, palpitation, lightheadedness, fainting.

## 2022-01-18 NOTE — CARDIOLOGY SUMMARY
[de-identified] : EKG 7/14/2021.  Atrial fibrillation.  Controlled.  R S waves V1 through V3.  No acute changes. [de-identified] : Stress MIBI 3/10/17. Baseline atrial fibrillation. No ischemia. EF 65%. Gutierrez stage III.  9-1/2 minutes. 11 METs. 85%. \par \par  [de-identified] : Echo 11/11/2021.  A.  F.  Slightly dilated LV with normal systolic function.  EF 65%.  Dilated LA/RA.  Mild TR.  PA minimally elevated 40.\par Echo 1/15/2015.  A.  F.  Normal LV function.  EF 65%.  LVH.  Normal RV.  Dilated RA/LA.  Normal valves. [de-identified] : Chest CT 7/27/2021.  Aneurysmal dilatation of ascending thoracic aorta, 4.2 cm.\par CT angiogram 10/23/18. No obvious pulmonary embolism. Dilated ascending thoracic  aorta, 4.3 cm (3.9 in 2010).\par \par  [de-identified] :  Lower extremitystudy 7/13/17. Negative. \par  [de-identified] : PFT 11/10/2015. Mild restrictive impairment, mild reduction in diffusion \par Sleep study 11/15/14. 39 apneas. Apnea index 43.3 per hour.\par

## 2022-01-18 NOTE — ASSESSMENT
[FreeTextEntry1] : EKG 1/18/2022.  Atrial fibrillation.  Controlled.  Poor R wave progression.  No acute changes.\par EKG 7/14/2021.  Atrial fibrillation.  Controlled.  R S waves V1 through V3.  No acute changes.\par EKG from hospital 1/12/2021.  Atrial fibrillation, controlled.  R S waves in lead III and aVF.  No acute changes.\par EKG 9/1/2020.  Atrial fibrillation.  Controlled.  R S waves V1 through V3.  Due to body habitus.  No acute changes.\par EKG atrial fibrillation.  Controlled.  Otherwise within normal limits.

## 2022-02-10 ENCOUNTER — APPOINTMENT (OUTPATIENT)
Dept: HEMATOLOGY ONCOLOGY | Facility: CLINIC | Age: 57
End: 2022-02-10

## 2022-02-10 ENCOUNTER — RESULT REVIEW (OUTPATIENT)
Age: 57
End: 2022-02-10

## 2022-02-10 VITALS
OXYGEN SATURATION: 98 % | HEART RATE: 75 BPM | BODY MASS INDEX: 45.1 KG/M2 | SYSTOLIC BLOOD PRESSURE: 131 MMHG | WEIGHT: 315 LBS | DIASTOLIC BLOOD PRESSURE: 77 MMHG | HEIGHT: 70 IN | TEMPERATURE: 96.6 F | RESPIRATION RATE: 16 BRPM

## 2022-02-11 ENCOUNTER — NON-APPOINTMENT (OUTPATIENT)
Age: 57
End: 2022-02-11

## 2022-02-16 ENCOUNTER — APPOINTMENT (OUTPATIENT)
Dept: HEMATOLOGY ONCOLOGY | Facility: CLINIC | Age: 57
End: 2022-02-16

## 2022-02-16 ENCOUNTER — RESULT REVIEW (OUTPATIENT)
Age: 57
End: 2022-02-16

## 2022-02-16 VITALS
BODY MASS INDEX: 45.1 KG/M2 | OXYGEN SATURATION: 99 % | RESPIRATION RATE: 16 BRPM | HEART RATE: 71 BPM | DIASTOLIC BLOOD PRESSURE: 85 MMHG | SYSTOLIC BLOOD PRESSURE: 127 MMHG | WEIGHT: 315 LBS | TEMPERATURE: 97.2 F | HEIGHT: 70 IN

## 2022-03-16 ENCOUNTER — APPOINTMENT (OUTPATIENT)
Dept: NEPHROLOGY | Facility: CLINIC | Age: 57
End: 2022-03-16

## 2022-03-22 NOTE — PHYSICAL EXAM
[No Acute Distress] : no acute distress [Irregularly Irregular] : irregularly irregular [Clear Lung Fields] : clear lung fields [Good Air Entry] : good air entry [No Respiratory Distress] : no respiratory distress  [Normal Gait] : normal gait [No Rash] : no rash [Moves all extremities] : moves all extremities [No Focal Deficits] : no focal deficits [Normal Speech] : normal speech [Alert and Oriented] : alert and oriented [Normal memory] : normal memory [de-identified] : irreg [de-identified] : 2+ edema LEs, small open area left shin with serous drainage No

## 2022-03-28 ENCOUNTER — RX RENEWAL (OUTPATIENT)
Age: 57
End: 2022-03-28

## 2022-04-06 ENCOUNTER — NON-APPOINTMENT (OUTPATIENT)
Age: 57
End: 2022-04-06

## 2022-04-06 ENCOUNTER — APPOINTMENT (OUTPATIENT)
Dept: CARDIOLOGY | Facility: CLINIC | Age: 57
End: 2022-04-06
Payer: MEDICARE

## 2022-04-06 VITALS
SYSTOLIC BLOOD PRESSURE: 140 MMHG | HEART RATE: 74 BPM | DIASTOLIC BLOOD PRESSURE: 90 MMHG | OXYGEN SATURATION: 98 % | BODY MASS INDEX: 47.92 KG/M2 | WEIGHT: 315 LBS

## 2022-04-06 PROCEDURE — 93000 ELECTROCARDIOGRAM COMPLETE: CPT

## 2022-04-06 PROCEDURE — 99214 OFFICE O/P EST MOD 30 MIN: CPT

## 2022-04-06 RX ORDER — ENOXAPARIN SODIUM 100 MG/ML
100 INJECTION SUBCUTANEOUS
Qty: 60 | Refills: 0 | Status: DISCONTINUED | COMMUNITY
Start: 2022-02-24 | End: 2022-04-06

## 2022-04-06 RX ORDER — OXYCODONE AND ACETAMINOPHEN 5; 325 MG/1; MG/1
5-325 TABLET ORAL AS DIRECTED
Refills: 0 | Status: DISCONTINUED | COMMUNITY
Start: 2020-04-27 | End: 2022-04-06

## 2022-04-06 RX ORDER — ENOXAPARIN SODIUM 100 MG/ML
40 INJECTION SUBCUTANEOUS
Qty: 30 | Refills: 4 | Status: DISCONTINUED | COMMUNITY
Start: 2022-02-10 | End: 2022-04-06

## 2022-04-06 NOTE — REASON FOR VISIT
[Symptom and Test Evaluation] : symptom and test evaluation [Hypertension] : hypertension [Spouse] : spouse [FreeTextEntry1] : Called office with c/o SOB \par Reports  1 week of increased SOB, intermittently  can be exertional or when lying down "can't catch breath"\par s/p lithotripsy for kidney stone  Was off Pradaxa for 2 weeks, covered with lovenox during that time\par Pneumonia 1 month ago\par Has nasal congestion - states chronic sinus issues \par \par He denies chest pain

## 2022-04-06 NOTE — PHYSICAL EXAM
[No Acute Distress] : no acute distress [Irregularly Irregular] : irregularly irregular [Clear Lung Fields] : clear lung fields [Good Air Entry] : good air entry [No Respiratory Distress] : no respiratory distress  [Normal Gait] : normal gait [No Rash] : no rash [Moves all extremities] : moves all extremities [No Focal Deficits] : no focal deficits [Normal Speech] : normal speech [Alert and Oriented] : alert and oriented [Normal memory] : normal memory [de-identified] : irreg [de-identified] : non pitting trace-1+ LE edema

## 2022-04-06 NOTE — REVIEW OF SYSTEMS
[Joint Pain] : joint pain [Fever] : no fever [Weight Gain (___ Lbs)] : no recent weight gain [Weight Loss (___ Lbs)] : no recent weight loss [SOB] : shortness of breath [Chest Discomfort] : no chest discomfort [Lower Ext Edema] : no extremity edema [Palpitations] : no palpitations [Syncope] : no syncope [Cough] : no cough [Dizziness] : no dizziness [Confusion] : no confusion was observed [Easy Bleeding] : no tendency for easy bleeding [Easy Bruising] : no tendency for easy bruising

## 2022-04-06 NOTE — CARDIOLOGY SUMMARY
[de-identified] : 7/14/21 Atrial fibrillation, controlled [de-identified] : \par Stress MIBI 3/10/17. Baseline atrial fibrillation. No ischemia. EF 65%. Gutierrez stage III. 9-1/2 minutes. 11 METs. 85%.  [de-identified] : 11/11/2021 Slightly dilated LV with normal systolic function EF 65% Dilated RA/LA, mild TR PA minimally elevated 40  [de-identified] : CT angiogram 10/23/18. No obvious pulmonary embolism. Dilated ascending thoracic aorta, 4.3 cm (3.9 in 2010). [de-identified] : Lower extremity study 7/13/17. Negative.

## 2022-04-07 ENCOUNTER — APPOINTMENT (OUTPATIENT)
Dept: HEMATOLOGY ONCOLOGY | Facility: CLINIC | Age: 57
End: 2022-04-07

## 2022-04-07 ENCOUNTER — RESULT REVIEW (OUTPATIENT)
Age: 57
End: 2022-04-07

## 2022-04-07 VITALS
TEMPERATURE: 97.4 F | HEIGHT: 70 IN | SYSTOLIC BLOOD PRESSURE: 158 MMHG | OXYGEN SATURATION: 98 % | BODY MASS INDEX: 45.1 KG/M2 | RESPIRATION RATE: 18 BRPM | HEART RATE: 89 BPM | DIASTOLIC BLOOD PRESSURE: 93 MMHG | WEIGHT: 315 LBS

## 2022-04-12 ENCOUNTER — NON-APPOINTMENT (OUTPATIENT)
Age: 57
End: 2022-04-12

## 2022-04-26 ENCOUNTER — APPOINTMENT (OUTPATIENT)
Dept: ENDOCRINOLOGY | Facility: CLINIC | Age: 57
End: 2022-04-26
Payer: MEDICARE

## 2022-04-26 VITALS
HEART RATE: 83 BPM | BODY MASS INDEX: 45.1 KG/M2 | OXYGEN SATURATION: 98 % | WEIGHT: 315 LBS | HEIGHT: 70 IN | SYSTOLIC BLOOD PRESSURE: 132 MMHG | DIASTOLIC BLOOD PRESSURE: 82 MMHG

## 2022-04-26 LAB — GLUCOSE BLDC GLUCOMTR-MCNC: 119

## 2022-04-26 PROCEDURE — 99215 OFFICE O/P EST HI 40 MIN: CPT | Mod: 25

## 2022-04-26 PROCEDURE — G0447 BEHAVIOR COUNSEL OBESITY 15M: CPT | Mod: 59

## 2022-04-26 PROCEDURE — 82962 GLUCOSE BLOOD TEST: CPT

## 2022-04-28 ENCOUNTER — APPOINTMENT (OUTPATIENT)
Dept: SURGERY | Facility: CLINIC | Age: 57
End: 2022-04-28

## 2022-04-28 ENCOUNTER — APPOINTMENT (OUTPATIENT)
Dept: BARIATRICS/WEIGHT MGMT | Facility: CLINIC | Age: 57
End: 2022-04-28

## 2022-04-28 VITALS
RESPIRATION RATE: 18 BRPM | HEART RATE: 81 BPM | OXYGEN SATURATION: 97 % | BODY MASS INDEX: 45.1 KG/M2 | DIASTOLIC BLOOD PRESSURE: 90 MMHG | WEIGHT: 315 LBS | TEMPERATURE: 97 F | HEIGHT: 70 IN | SYSTOLIC BLOOD PRESSURE: 143 MMHG

## 2022-04-28 PROCEDURE — 46600 DIAGNOSTIC ANOSCOPY SPX: CPT

## 2022-04-28 PROCEDURE — 99202 OFFICE O/P NEW SF 15 MIN: CPT | Mod: 25
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
## 2022-04-30 NOTE — DISCUSSION/SUMMARY
[FreeTextEntry1] : pt received cortizone shot \par received Humalog Rx and got 10units SC x3 then BS went down to 300\par this am and today [id number] will try Humalog 7units actid only if BS before meal >200\par check sugars before each meal and bedtime\par \par before meals your sugars  should be between  \par \par 1-2hrs after you start eating should be no more than 180\par \par HgA1c <7%\par \par Call us for any sugar under 70s or persistent highs 200s and above \par \par I called pt next day to check feeling good BS 200s, will increase Humalog from 5 to 7actid

## 2022-05-10 ENCOUNTER — APPOINTMENT (OUTPATIENT)
Dept: VASCULAR SURGERY | Facility: CLINIC | Age: 57
End: 2022-05-10

## 2022-05-10 ENCOUNTER — APPOINTMENT (OUTPATIENT)
Dept: GASTROENTEROLOGY | Facility: HOSPITAL | Age: 57
End: 2022-05-10

## 2022-05-16 ENCOUNTER — APPOINTMENT (OUTPATIENT)
Dept: VASCULAR SURGERY | Facility: CLINIC | Age: 57
End: 2022-05-16

## 2022-05-16 ENCOUNTER — APPOINTMENT (OUTPATIENT)
Dept: CARDIOLOGY | Facility: CLINIC | Age: 57
End: 2022-05-16
Payer: MEDICARE

## 2022-05-16 VITALS
BODY MASS INDEX: 47.49 KG/M2 | HEART RATE: 80 BPM | DIASTOLIC BLOOD PRESSURE: 70 MMHG | WEIGHT: 315 LBS | SYSTOLIC BLOOD PRESSURE: 118 MMHG

## 2022-05-16 PROCEDURE — 99214 OFFICE O/P EST MOD 30 MIN: CPT

## 2022-05-16 PROCEDURE — 36415 COLL VENOUS BLD VENIPUNCTURE: CPT

## 2022-05-16 RX ORDER — FLUTICASONE PROPIONATE 50 UG/1
50 SPRAY, METERED NASAL
Qty: 48 | Refills: 0 | Status: ACTIVE | COMMUNITY
Start: 2022-05-12

## 2022-05-16 NOTE — REASON FOR VISIT
[Symptom and Test Evaluation] : symptom and test evaluation [Hypertension] : hypertension [Spouse] : spouse [FreeTextEntry1] : Went to [facility] ED 5/6/2022 with complaint of SOB for 1 week, worsening SOB when lying flat\par He was discharged, no med changes saw PCP who referred him to cardiology\par \par 3 days ago lasix 40mg daily started -  when in ED\par \par s/p lithotripsy for kidney stone in March  Was off Pradaxa for 2 weeks, covered with lovenox during that time\par Pneumonia 2 month ago\par \par Gerry reports some improvement in SOB since on diuretic, reports good diuresis and c/o SOB when lying flat improved, still some JAY but better\par

## 2022-05-16 NOTE — PHYSICAL EXAM
[No Acute Distress] : no acute distress [Irregularly Irregular] : irregularly irregular [Clear Lung Fields] : clear lung fields [Good Air Entry] : good air entry [No Respiratory Distress] : no respiratory distress  [Normal Gait] : normal gait [No Rash] : no rash [Moves all extremities] : moves all extremities [No Focal Deficits] : no focal deficits [Normal Speech] : normal speech [Alert and Oriented] : alert and oriented [Normal memory] : normal memory [de-identified] : irreg [de-identified] : non pitting trace-1+ LE edema

## 2022-05-16 NOTE — REVIEW OF SYSTEMS
[Joint Pain] : joint pain [Fever] : no fever [Weight Gain (___ Lbs)] : no recent weight gain [Weight Loss (___ Lbs)] : no recent weight loss [SOB] : no shortness of breath [Dyspnea on exertion] : dyspnea during exertion [Chest Discomfort] : no chest discomfort [Lower Ext Edema] : lower extremity edema [Palpitations] : no palpitations [Syncope] : no syncope [Cough] : no cough [Dizziness] : no dizziness [Confusion] : no confusion was observed [Easy Bleeding] : no tendency for easy bleeding [Easy Bruising] : no tendency for easy bruising

## 2022-05-16 NOTE — CARDIOLOGY SUMMARY
[de-identified] : 7/14/21 Atrial fibrillation, controlled [de-identified] : \par Stress MIBI 3/10/17. Baseline atrial fibrillation. No ischemia. EF 65%. Gutierrez stage III. 9-1/2 minutes. 11 METs. 85%.  [de-identified] : 11/11/2021 Slightly dilated LV with normal systolic function EF 65% Dilated RA/LA, mild TR PA minimally elevated 40  [de-identified] : CT angiogram 10/23/18. No obvious pulmonary embolism. Dilated ascending thoracic aorta, 4.3 cm (3.9 in 2010). [de-identified] : Lower extremity study 7/13/17. Negative.

## 2022-05-17 LAB
ALBUMIN SERPL ELPH-MCNC: 4.3 G/DL
ALP BLD-CCNC: 199 U/L
ALT SERPL-CCNC: 30 U/L
ANION GAP SERPL CALC-SCNC: 14 MMOL/L
AST SERPL-CCNC: 26 U/L
BILIRUB SERPL-MCNC: 0.5 MG/DL
BUN SERPL-MCNC: 15 MG/DL
CALCIUM SERPL-MCNC: 8.9 MG/DL
CHLORIDE SERPL-SCNC: 104 MMOL/L
CO2 SERPL-SCNC: 23 MMOL/L
CREAT SERPL-MCNC: 1.05 MG/DL
EGFR: 82 ML/MIN/1.73M2
GLUCOSE SERPL-MCNC: 210 MG/DL
POTASSIUM SERPL-SCNC: 4.4 MMOL/L
PROT SERPL-MCNC: 7.3 G/DL
SODIUM SERPL-SCNC: 141 MMOL/L

## 2022-05-28 ENCOUNTER — RESULT REVIEW (OUTPATIENT)
Age: 57
End: 2022-05-28

## 2022-05-31 ENCOUNTER — APPOINTMENT (OUTPATIENT)
Dept: GASTROENTEROLOGY | Facility: HOSPITAL | Age: 57
End: 2022-05-31

## 2022-06-01 ENCOUNTER — RX RENEWAL (OUTPATIENT)
Age: 57
End: 2022-06-01

## 2022-06-01 RX ORDER — DOXYCYCLINE HYCLATE 100 MG/1
100 TABLET ORAL
Qty: 14 | Refills: 0 | Status: DISCONTINUED | COMMUNITY
Start: 2022-02-04 | End: 2022-06-01

## 2022-06-15 NOTE — HISTORY OF PRESENT ILLNESS
[de-identified] : Capsule  study  ( Dr. Perales) for anemia was unremarkable except for gastritis. \par Labs ( 4/2022) HGB = 11.4 / normal MCV\par \par At last evaluation  ( 10/2021 ) c/o  rectal pressure and  burning after meals x  several months.  Additionally c/o loose / semi-solid  stools.  Saw Dr. Levine ~ 4 months ago for hemorrhoids.  Additional had  burning with urination.  Stool studies were notable  for elevated lactoferrin and  calprotectin. Referred to Gy.  Gave  trial of Azithromycin for elevated lactoferrin / calprotectin. \par \par Evaluated 12/2020 for GERD and H/H slightly decreased at 13/40. Admitted to frequent Heartburn. EGD / colonoscopy ( 4/2021) revealed gastritis / hemorrhoids / diverticulosis / rectal polyps ( hyperplastic) \par \par Denies BRBPR / melena / hematemesis\par \par -EGD in 7/2018 for dysphagia was unremarkable.\par  \par Evaluated 2/2018  for follow up for rectal bleeding / itching / burning and pain (on Pradaxa).  Had purposefully lost weight with Medifast / exercise. \par \par Evaluated  in 2/2015 for self limited abdominal pain / nausea. Work up was unrevealing ( including CAT scan revealing unchanged 1.1 cm liver lesion, nephrolithiasis / renal cyst) . Sonogram reportedly showed GB sludge. Referred to Dr. Loera who apparently did not believe the GB was the problem.\par \par - EGD 12/2014 ( reflux) revealed a hyperplastic gastric polyp. \par -Colonoscopy for diarrhea on 10/2013 ( non specific inflam changes ./ adenoma / c. diff negative ) and on 5/29/12 ( diverticulosis and hemorrhoids / Random biopsies were normal /Stool studies were notable for C.diff which was treated with Flagyl) .\par \par The patient also has a h/o diverticulitis, last attack in 2008. CAT scan in 2012 for abnormal liver enzymes ( AST =79) and a liver lesion seen on a CAt scan in 2008 revealed fatty liver and an unchanged liver lesion ( c/w 2008) consistent with a cyst or hamartoma. \par \par -Colonoscopy 12/2010 revealed diverticulosis, hemorrhoids and a benign polyp. \par -EGD in 2010 / 2005 revealed gastritis and a small HH.\par -Colonoscopy in 2008 revealed hemorrhoids, diverticulosis and a benign polyp. Similar findings were noted at a colonoscopy in 2006 / 2004. \par

## 2022-06-16 ENCOUNTER — APPOINTMENT (OUTPATIENT)
Dept: GASTROENTEROLOGY | Facility: CLINIC | Age: 57
End: 2022-06-16

## 2022-06-24 ENCOUNTER — RESULT REVIEW (OUTPATIENT)
Age: 57
End: 2022-06-24

## 2022-06-24 ENCOUNTER — APPOINTMENT (OUTPATIENT)
Dept: HEMATOLOGY ONCOLOGY | Facility: CLINIC | Age: 57
End: 2022-06-24

## 2022-06-24 VITALS
DIASTOLIC BLOOD PRESSURE: 81 MMHG | SYSTOLIC BLOOD PRESSURE: 139 MMHG | BODY MASS INDEX: 45.1 KG/M2 | TEMPERATURE: 96.8 F | WEIGHT: 315 LBS | RESPIRATION RATE: 20 BRPM | OXYGEN SATURATION: 96 % | HEIGHT: 70 IN | HEART RATE: 84 BPM

## 2022-07-05 ENCOUNTER — APPOINTMENT (OUTPATIENT)
Dept: SURGERY | Facility: CLINIC | Age: 57
End: 2022-07-05

## 2022-07-05 VITALS
HEART RATE: 82 BPM | WEIGHT: 315 LBS | DIASTOLIC BLOOD PRESSURE: 90 MMHG | SYSTOLIC BLOOD PRESSURE: 145 MMHG | RESPIRATION RATE: 18 BRPM | BODY MASS INDEX: 45.1 KG/M2 | OXYGEN SATURATION: 98 % | HEIGHT: 70 IN

## 2022-07-05 PROCEDURE — 99212 OFFICE O/P EST SF 10 MIN: CPT

## 2022-07-08 ENCOUNTER — APPOINTMENT (OUTPATIENT)
Dept: GASTROENTEROLOGY | Facility: CLINIC | Age: 57
End: 2022-07-08

## 2022-07-08 VITALS
HEART RATE: 97 BPM | TEMPERATURE: 96.9 F | HEIGHT: 70 IN | WEIGHT: 315 LBS | DIASTOLIC BLOOD PRESSURE: 80 MMHG | SYSTOLIC BLOOD PRESSURE: 120 MMHG | BODY MASS INDEX: 45.1 KG/M2 | OXYGEN SATURATION: 97 %

## 2022-07-08 PROCEDURE — 99214 OFFICE O/P EST MOD 30 MIN: CPT

## 2022-07-08 NOTE — HISTORY OF PRESENT ILLNESS
[de-identified] : Presents  with persistent  hemorrhoidal discomfort / reflux\par \par Capsule  study  ( Dr. Perales) for anemia was unremarkable except for gastritis. \par Labs ( 4/2022) HGB = 11.4 / normal MCV\par \par At last evaluation  ( 10/2021 ) c/o  rectal pressure and  burning after meals x  several months.  Additionally c/o loose / semi-solid  stools.  Saw Dr. Levine ~ 4 months ago for hemorrhoids.  Additional had  burning with urination ( Referred to Yg ) \par  \par Stool studies were notable  for elevated lactoferrin and  calprotectin. Gave  trial of Azithromycin for elevated lactoferrin / calprotectin. \par \par Evaluated 12/2020 for GERD and H/H slightly decreased at 13/40. Admitted to frequent Heartburn. EGD / colonoscopy ( 4/2021) revealed gastritis / hemorrhoids / diverticulosis / rectal polyps ( hyperplastic) \par \par -EGD in 7/2018 for dysphagia was unremarkable.\par  \par Evaluated 2/2018  for follow up for rectal bleeding / itching / burning and pain (on Pradaxa).  Had purposefully lost weight with Medifast / exercise. \par \par Evaluated  in 2/2015 for self limited abdominal pain / nausea. Work up was unrevealing ( including CAT scan revealing unchanged 1.1 cm liver lesion, nephrolithiasis / renal cyst) . Sonogram reportedly showed GB sludge. Referred to Dr. Loera who apparently did not believe the GB was the problem.\par \par - EGD 12/2014 ( reflux) revealed a hyperplastic gastric polyp. \par -Colonoscopy for diarrhea on 10/2013 ( non specific inflam changes ./ adenoma / c. diff negative ) and on 5/29/12 ( diverticulosis and hemorrhoids / Random biopsies were normal /Stool studies were notable for C.diff which was treated with Flagyl) .\par \par The patient also has a h/o diverticulitis, last attack in 2008. CAT scan in 2012 for abnormal liver enzymes ( AST =79) and a liver lesion seen on a CAt scan in 2008 revealed fatty liver and an unchanged liver lesion ( c/w 2008) consistent with a cyst or hamartoma. \par \par -Colonoscopy 12/2010 revealed diverticulosis, hemorrhoids and a benign polyp. \par -EGD in 2010 / 2005 revealed gastritis and a small HH.\par -Colonoscopy in 2008 revealed hemorrhoids, diverticulosis and a benign polyp. Similar findings were noted at a colonoscopy in 2006 / 2004. \par

## 2022-07-08 NOTE — ASSESSMENT
[FreeTextEntry1] : GERD / hemorrhoids:  I believe that the majority of  this  patient problems  for which he  sees  me is  due to Obesity. When he lost significant  weight several years ago, he felt great.  Referring to Dr. Youngblood for loyda loss management.  Patient  agrees that felt great when he was thinner and  that weight is  likely contributing to his  GI issues\par \par Pertinent available records reviewed\par

## 2022-07-08 NOTE — PHYSICAL EXAM
[Neck Appearance] : the appearance of the neck was normal [] : no respiratory distress [Apical Impulse] : the apical impulse was normal [Abdomen Soft] : soft [Abnormal Walk] : normal gait [Skin Color & Pigmentation] : normal skin color and pigmentation [No Focal Deficits] : no focal deficits [Oriented To Time, Place, And Person] : oriented to person, place, and time [General Appearance - Alert] : alert [General Appearance - In No Acute Distress] : in no acute distress [Sclera] : the sclera and conjunctiva were normal [Outer Ear] : the ears and nose were normal in appearance [FreeTextEntry1] : deferred

## 2022-07-11 ENCOUNTER — RX RENEWAL (OUTPATIENT)
Age: 57
End: 2022-07-11

## 2022-07-12 ENCOUNTER — APPOINTMENT (OUTPATIENT)
Dept: BARIATRICS/WEIGHT MGMT | Facility: CLINIC | Age: 57
End: 2022-07-12

## 2022-07-12 VITALS
RESPIRATION RATE: 18 BRPM | OXYGEN SATURATION: 97 % | HEART RATE: 95 BPM | BODY MASS INDEX: 45.1 KG/M2 | WEIGHT: 315 LBS | HEIGHT: 70 IN | SYSTOLIC BLOOD PRESSURE: 152 MMHG | DIASTOLIC BLOOD PRESSURE: 88 MMHG

## 2022-07-12 DIAGNOSIS — Z78.9 OTHER SPECIFIED HEALTH STATUS: ICD-10-CM

## 2022-07-12 PROCEDURE — 99205 OFFICE O/P NEW HI 60 MIN: CPT

## 2022-07-12 NOTE — HISTORY OF PRESENT ILLNESS
[FreeTextEntry1] : 56 y/o Male here for medical weight management consultation\par Referred by Endocrinologist- Dr. Boyle & GI- Dr. Nieto\par Medical Hx: T2DM, HTN, HLD, Anemia, PE, Kidney Stones, Hypothyroidism, Osteoarthritis, Afib, Asthma, Diverticulitis, NICOLE with CPAP\par Struggled with weight as an adult, lost 120 lbs with [employer]avia in the past, but gained weight after struggling with inactivity due to back & knee pain. Enjoys exercising and weight training and feels that it is time to get healthier. \par Goal- to lose weight before September when he goes on vacation to [place]\par Past Wt Loss Attempts: [employer], Wt training, aerobic exercise, WW, Beach Body \par Food Recall: B- corn muffin, decaf coffee Late lunch- cheeseburger with french fries, Skipped dinner, 2 snacks- plum & grapes\par + Lg portions, snacks on fruit \par Water Intake:Adequate. Drinks decaf coffee, decaf unsweetened iced tea, seltzer water and water throughout the day \par Exercise Regimen: Uses spin bike or Elliptical 2-3 times per week \par Sleep: inadequate, wakes up to void or from back pain \par Stress Level: high, feels that exercise will help with this, recently started taking Zoloft \par Social Hx: + alcohol intake (glass of whiskey most nights), denies smoking or illicit drug use \par Reports that he recently had labs checked by endocrinologist- reports that HgBa1c was 10 and is now down to 8%, will have labs faxed for my review. Wears a continuous glucose monitor- current glucose 173 post prandial, was 143 fasting this morning. Ate a buttered roll for breakfast. \par \par \par

## 2022-07-12 NOTE — PHYSICAL EXAM
[Obese, well nourished, in no acute distress] : obese, well nourished, in no acute distress [Normal] : affect appropriate [de-identified] : bilateral feet skin intact

## 2022-07-12 NOTE — ASSESSMENT
[FreeTextEntry1] : Diagnosis: Class 3 Obesity with Hx of Diabetes Mellitus, NICOLE, HTN, and HLD\par Dietary Modification Education provided. Recommend a diet high in vegetables intake & lean protein. Recommend eating complex carbs instead of simple carbs. Discussed the healthy plate model and ways to help with portion control. Recommend pairing carb snacks with protein- ex: apple with nut butter, greek yogurt with berries. \par Rec avoiding processed foods & fried foods and rec decreasing sodium intake to help with BP & Cholesterol.\par RD referral for additional diabetes education- pt is ok with self pay rate\par Physical Activity- recommend aerobic exercise 3-4 times per week for 30-45 mins, recommend incorporating strength training 3 times per week. Has a gym membership and interested in working with a  \par Mental Health & Sleep- Discussed the importance of getting adequate sleep each night and decreasing stress level to enable his body to lose weight. Recommend exercising to help increase endorphins and decrease stress. Started taking zoloft one month ago. May benefit from therapy if this is ineffective.\par Alcohol intake- rec reducing alcohol intake over time \par Medication- pt meets criteria to initiate medication treatment of obesity. May benefit from either changing over from trulicity to ozempic for added appetite suppression and weight loss. Would need endocrinologists support for this.\par If unable to change to ozempic, may benefit from Plenity treatment to help with portion control \par Diabetes management- sees endocrinologist for DM & hypothyroidism, saw Podiatrist last week, saw Opthalmology 1 year ago\par NICOLE- rec continued use of CPAP every night \par RTO in 1 month for accountability\par \par

## 2022-07-12 NOTE — CONSULT LETTER
[Dear  ___] : Dear  [unfilled], [Consult Letter:] : I had the pleasure of evaluating your patient, [unfilled]. [Please see my note below.] : Please see my note below. [Consult Closing:] : Thank you very much for allowing me to participate in the care of this patient.  If you have any questions, please do not hesitate to contact me. [Sincerely,] : Sincerely, [FreeTextEntry3] : Debbie Johns, NP

## 2022-07-18 ENCOUNTER — NON-APPOINTMENT (OUTPATIENT)
Age: 57
End: 2022-07-18

## 2022-07-19 ENCOUNTER — NON-APPOINTMENT (OUTPATIENT)
Age: 57
End: 2022-07-19

## 2022-07-19 ENCOUNTER — APPOINTMENT (OUTPATIENT)
Dept: CARDIOLOGY | Facility: CLINIC | Age: 57
End: 2022-07-19

## 2022-07-19 VITALS — SYSTOLIC BLOOD PRESSURE: 140 MMHG | DIASTOLIC BLOOD PRESSURE: 75 MMHG

## 2022-07-19 VITALS — WEIGHT: 315 LBS | OXYGEN SATURATION: 98 % | BODY MASS INDEX: 45.1 KG/M2 | HEART RATE: 100 BPM | HEIGHT: 70 IN

## 2022-07-19 PROCEDURE — 93000 ELECTROCARDIOGRAM COMPLETE: CPT

## 2022-07-19 PROCEDURE — 99215 OFFICE O/P EST HI 40 MIN: CPT

## 2022-07-19 RX ORDER — ENOXAPARIN SODIUM 40 MG/.4ML
40 INJECTION, SOLUTION SUBCUTANEOUS
Qty: 12 | Refills: 0 | Status: DISCONTINUED | COMMUNITY
Start: 2022-02-10 | End: 2022-07-19

## 2022-07-19 RX ORDER — MULTIVITAMIN
TABLET ORAL DAILY
Refills: 0 | Status: DISCONTINUED | COMMUNITY
Start: 2021-07-20 | End: 2022-07-19

## 2022-07-19 RX ORDER — TAMSULOSIN HYDROCHLORIDE 0.4 MG/1
0.4 CAPSULE ORAL
Qty: 60 | Refills: 0 | Status: DISCONTINUED | COMMUNITY
Start: 2022-03-09 | End: 2022-07-19

## 2022-07-19 NOTE — CARDIOLOGY SUMMARY
[de-identified] : EKG 7/14/2021.  Atrial fibrillation.  Controlled.  R S waves V1 through V3.  No acute changes. [de-identified] : Stress MIBI 3/10/17. Baseline atrial fibrillation. No ischemia. EF 65%. Gutierrez stage III.  9-1/2 minutes. 11 METs. 85%. \par \par  [de-identified] : Echo: 11/11/2021 Slightly dilated LV with normal systolic function EF 65% Dilated RA/LA, mild TR PA minimally elevated 40 \par \par Echo 1/15/2015.  A.  F.  Normal LV function.  EF 65%.  LVH.  Normal RV.  Dilated RA/LA.  Normal valves. [de-identified] : Data review from emergency room visit 5/6/2022.\par CT angiogram 5/6/2022, no pulmonary embolism or acute pathology.\par Chest CT 7/27/2021.  Aneurysmal dilatation of ascending thoracic aorta, 4.2 cm.\par CT angiogram 10/23/18. No obvious pulmonary embolism. Dilated ascending thoracic  aorta, 4.3 cm (3.9 in 2010).\par \par  [de-identified] :  Lower extremity study 7/13/17. Negative. \par  [de-identified] : PFT 11/10/2015. Mild restrictive impairment, mild reduction in diffusion \par Sleep study 11/15/14. 39 apneas. Apnea index 43.3 per hour.\par

## 2022-07-19 NOTE — HISTORY OF PRESENT ILLNESS
[FreeTextEntry1] : This 57 year-old male patient presents for cardiovascular evaluation.\par \par His problem list is as noted above.\par \par Since his last examination 7 months ago the patient has had a number of medical interactions.  Considerable records were reviewed.\par \par He had an emergency room visit in February for pneumonia.  He completed a course of antibiotic therapy.\par \par The patient underwent lithotripsy for kidney stones in March.  He was off oral anticoagulation and was noted to have significant hematuria at that time.  He was covered with Lovenox.  He has had follow-up with our nurse practitioners.  He also had an emergency department visit in May with shortness of breath.  He did see his primary care physician.  He received a course of diuretic therapy with some improvement.\par \par Patient also had GI follow-up with Dr. Nieto and consultation with the obesity specialists.  He also had heme-onc follow-up for anticoagulation management.\par \par He is also had some ENT issues with some tinnitus and mild hearing loss.\par \par He is working with a dietitian and .\par \par \par

## 2022-07-19 NOTE — ASSESSMENT
[FreeTextEntry1] : EKG 7/19/2022.  Atrial fibrillation, controlled.  R S waves V1 through V3.  No acute features.\par  EKG 7/14/2021.  Atrial fibrillation.  Controlled.  R S waves V1 through V3.  No acute changes.\par EKG from hospital 1/12/2021.  Atrial fibrillation, controlled.  R S waves in lead III and aVF.  No acute changes.\par EKG 9/1/2020.  Atrial fibrillation.  Controlled.  R S waves V1 through V3.  Due to body habitus.  No acute changes.\par EKG atrial fibrillation.  Controlled.  Otherwise within normal limits.

## 2022-07-19 NOTE — REVIEW OF SYSTEMS
[FreeTextEntry4] : Evaluation for tinnitus and hearing loss [FreeTextEntry5] : See HPI [FreeTextEntry6] : Sleep apnea has returned with weight gain. [FreeTextEntry7] : Rare GERD.  History of diverticulosis.  Periodic hemorrhoidal bleeding. [FreeTextEntry8] : Recurrent kidney stones.  Dr. Ronquillo.  Status post lithotripsy.  ED, [FreeTextEntry9] : Bilateral knee pain.  Improved.  Consultation with Dr. Baudilio Haq. [de-identified] : History left-sided sciatica.  Also right-sided sciatica.

## 2022-07-19 NOTE — PHYSICAL EXAM
[de-identified] : Loss of nearly 20 pounds since last examination. [de-identified] : 1+ chronic bilateral edema. [de-identified] : Some chronic skin changes

## 2022-07-19 NOTE — DISCUSSION/SUMMARY
[FreeTextEntry1] : Brief recommendations and follow-up: (see above for details)\par \par The patient remains with a considerable cardiovascular medical problem list.\par He is well compensated overall.\par I congratulated him on his nearly 20 pound weight loss.\par As noted he has had a number of recent medical evaluations.  Notes were reviewed.\par Medications reviewed, reconciled.\par Continue current routine.\par Today's visit 40 minutes.\par Next visit 6 months.

## 2022-07-19 NOTE — REASON FOR VISIT
[FreeTextEntry1] : Mr. ELMER MOSQUERA has the following problem list.\par \par Chronic atrial fibrillation\par Dyslipidemia\par Hypertension\par History of pulmonary embolism\par Type 2 diabetes\par Aortic dilatation\par Obesity\par History of sleep apnea\par \par He has additional medical problems as noted.\par This includes kidney stones.\par \par His primary care physician is Dr. Metcalf\par

## 2022-07-27 ENCOUNTER — TRANSCRIPTION ENCOUNTER (OUTPATIENT)
Age: 57
End: 2022-07-27

## 2022-07-27 ENCOUNTER — APPOINTMENT (OUTPATIENT)
Dept: BARIATRICS/WEIGHT MGMT | Facility: CLINIC | Age: 57
End: 2022-07-27

## 2022-07-27 VITALS — BODY MASS INDEX: 45.1 KG/M2 | WEIGHT: 315 LBS | HEIGHT: 70 IN

## 2022-07-27 PROCEDURE — 97803 MED NUTRITION INDIV SUBSEQ: CPT | Mod: GA

## 2022-08-01 ENCOUNTER — NON-APPOINTMENT (OUTPATIENT)
Age: 57
End: 2022-08-01

## 2022-08-01 NOTE — HISTORY OF PRESENT ILLNESS
[FreeTextEntry1] : 57-year-old male here for initial evaluation for hemorrhoids.  Referred by Dr. Nieto.  Patient has been having worsening pain with bowel movements.  Denies significant bleeding symptoms.  Patient has burning sensation after bowel movements.  Does not note significant enlarged lumps on the outside.  Previously seen by Dr. Crews for hemorrhoid management.  Patient is on Lovenox for history of A. fib.  Moves bowels daily.  Have been relatively soft and loose recently using significant stool softeners.

## 2022-08-01 NOTE — PHYSICAL EXAM
[Abdomen Masses] : No abdominal masses [Abdomen Tenderness] : ~T No ~M abdominal tenderness [Excoriation] : no perianal excoriation [Tender, Swollen] : nontender, non-swollen [Normal] : was normal [None] : there was no rectal abscess [JVD] : no jugular venous distention  [Respiratory Effort] : normal respiratory effort [No Rash or Lesion] : No rash or lesion [Alert] : alert [Calm] : calm [de-identified] : Mildly irritated perianal skin. [de-identified] : No acute distress

## 2022-08-01 NOTE — ASSESSMENT
[FreeTextEntry1] : Mr. MOSQUERA is a 57 year male who presents for symptomatic hemorrhoids. \par \par Discussed management options for hemorrhoids including medical management with fiber supplements, increased fluid intake, and symptom management during acute flares, office based procedures including rubber band ligation, and surgical excision. \par \par Given the findings today on exam recommend starting with medical management of hemorrhoids.\par Recommend daily fiber supplement such as Metamucil with 1 scoop in a glass of water in the morning. Daily fiber intake recommendation is 30 gm. \par Recommend 6-8 glasses of noncaffeinated beverage daily to help with constipation symptoms. \par \par If symptoms do not improve or return recommend follow up in 6-8 weeks.\par

## 2022-08-01 NOTE — PROCEDURE
[FreeTextEntry1] : Anoscopy: Anoscopic exam performed with lighted anoscope.  Mildly enlarged internal hemorrhoids.  No active ulceration or bleeding.  No other anorectal mucosal masses identified.

## 2022-08-09 ENCOUNTER — APPOINTMENT (OUTPATIENT)
Dept: BARIATRICS/WEIGHT MGMT | Facility: CLINIC | Age: 57
End: 2022-08-09

## 2022-08-11 ENCOUNTER — NON-APPOINTMENT (OUTPATIENT)
Age: 57
End: 2022-08-11

## 2022-08-16 ENCOUNTER — APPOINTMENT (OUTPATIENT)
Dept: ENDOCRINOLOGY | Facility: CLINIC | Age: 57
End: 2022-08-16

## 2022-08-16 PROCEDURE — 95251 CONT GLUC MNTR ANALYSIS I&R: CPT

## 2022-08-16 RX ORDER — DULAGLUTIDE 1.5 MG/.5ML
1.5 INJECTION, SOLUTION SUBCUTANEOUS
Qty: 3 | Refills: 1 | Status: DISCONTINUED | COMMUNITY
Start: 2022-01-06 | End: 2022-08-16

## 2022-08-29 NOTE — PHYSICAL EXAM
[Abdomen Masses] : No abdominal masses [Abdomen Tenderness] : ~T No ~M abdominal tenderness [Normal] : was normal [JVD] : no jugular venous distention  [Respiratory Effort] : normal respiratory effort [Alert] : alert [Calm] : calm [de-identified] : Perianal skin irritation tender to the touch no erythema or signs of infection [de-identified] : No acute distress

## 2022-08-29 NOTE — ASSESSMENT
[FreeTextEntry1] : 57-year-old male here for follow-up for hemorrhoids.  Patient has persistent perianal discomfort with bowel movements.  On exam today his skin appears more irritated than his last visit.  He seems to be having relatively loose bowel movements which may be causing his perianal skin irritation.  Recommend follow-up with Dr. Nieto to discuss a bowel regimen that may lead to normal bowel movements without constipation but not having loose bowel movements.  Can use a barrier cream such as Desitin to aid in healing of the skin.  Not clear to me that treatment of hemorrhoids will improve his symptoms at this time.

## 2022-08-29 NOTE — HISTORY OF PRESENT ILLNESS
[FreeTextEntry1] : 57-year-old male seen previously for hemorrhoids returns for follow-up.  Patient has had persistent discomfort with bowel movements.  Bowels have been relatively soft and easy to pass.  Has pain after bowel movements.  Has been using stool softeners to avoid constipation.

## 2022-09-12 ENCOUNTER — APPOINTMENT (OUTPATIENT)
Dept: ENDOCRINOLOGY | Facility: CLINIC | Age: 57
End: 2022-09-12

## 2022-09-19 ENCOUNTER — NON-APPOINTMENT (OUTPATIENT)
Age: 57
End: 2022-09-19

## 2022-09-21 ENCOUNTER — NON-APPOINTMENT (OUTPATIENT)
Age: 57
End: 2022-09-21

## 2022-09-22 ENCOUNTER — APPOINTMENT (OUTPATIENT)
Dept: NEPHROLOGY | Facility: CLINIC | Age: 57
End: 2022-09-22

## 2022-10-05 RX ORDER — INSULIN LISPRO 100 [IU]/ML
100 INJECTION, SOLUTION INTRAVENOUS; SUBCUTANEOUS
Qty: 15 | Refills: 0 | Status: DISCONTINUED | COMMUNITY
Start: 2021-11-10 | End: 2022-10-05

## 2022-10-12 ENCOUNTER — RX RENEWAL (OUTPATIENT)
Age: 57
End: 2022-10-12

## 2022-10-13 RX ORDER — FUROSEMIDE 40 MG/1
40 TABLET ORAL
Qty: 30 | Refills: 5 | Status: DISCONTINUED | COMMUNITY
Start: 2021-10-06 | End: 2022-10-13

## 2022-10-23 ENCOUNTER — RESULT REVIEW (OUTPATIENT)
Age: 57
End: 2022-10-23

## 2022-10-23 ENCOUNTER — TRANSCRIPTION ENCOUNTER (OUTPATIENT)
Age: 57
End: 2022-10-23

## 2022-10-25 ENCOUNTER — RESULT REVIEW (OUTPATIENT)
Age: 57
End: 2022-10-25

## 2022-10-26 ENCOUNTER — APPOINTMENT (OUTPATIENT)
Dept: GASTROENTEROLOGY | Facility: CLINIC | Age: 57
End: 2022-10-26

## 2022-10-26 PROCEDURE — 99442: CPT | Mod: 95

## 2022-10-26 NOTE — PHYSICAL EXAM
[No Focal Deficits] : no focal deficits [FreeTextEntry1] : deferred [Oriented To Time, Place, And Person] : oriented to person, place, and time

## 2022-10-26 NOTE — ASSESSMENT
[FreeTextEntry1] : Nausea:  suspect secondary to newly started Ozempic.  Patient  will discuss with prescribing MD and  follow up with me as needed\par \par Pertinent available records reviewed\par

## 2022-10-26 NOTE — HISTORY OF PRESENT ILLNESS
[Home] : at home, [unfilled] , at the time of the visit. [Medical Office: ([facility])___] : at the medical office located in  [Verbal consent obtained from patient] : the patient, [unfilled] [de-identified] : Telephonic  visit:\par \par Presents  with a  2  week c/o nausea ( constant).  Started Ozempic  @ 1 month ago. Denies melena / diarrhea / emesis. \par \par Presented   with persistent  hemorrhoidal discomfort / reflux at July 2022 follow  up. Anusol and  weight loss along with dietary and  lifestyle  modification recommended\par \par Capsule  study  ( Dr. Perales - 5/2022) for anemia was unremarkable except for gastritis. \par Labs ( 4/2022) HGB = 11.4 / normal MCV\par \par At evaluation  ( 10/2021 ) c/o  rectal pressure and  burning after meals x  several months.  Additionally c/o loose / semi-solid  stools.  Saw Dr. Levine ~ 4 months ago for hemorrhoids.  Additional had  burning with urination ( Referred to Yg ) \par  \par Stool studies were notable  for elevated lactoferrin and  calprotectin. Gave  trial of Azithromycin for elevated lactoferrin / calprotectin. \par \par Evaluated 12/2020 for GERD and H/H slightly decreased at 13/40. Admitted to frequent Heartburn. EGD / colonoscopy ( 4/2021) revealed gastritis / hemorrhoids / diverticulosis / rectal polyps ( hyperplastic) \par \par -EGD in 7/2018 for dysphagia was unremarkable.\par  \par Evaluated 2/2018  for follow up for rectal bleeding / itching / burning and pain (on Pradaxa).  Had purposefully lost weight with Medifast / exercise. \par \par Evaluated  in 2/2015 for self limited abdominal pain / nausea. Work up was unrevealing ( including CAT scan revealing unchanged 1.1 cm liver lesion, nephrolithiasis / renal cyst) . Sonogram reportedly showed GB sludge. Referred to Dr. Loera who apparently did not believe the GB was the problem.\par \par - EGD 12/2014 ( reflux) revealed a hyperplastic gastric polyp. \par -Colonoscopy for diarrhea on 10/2013 ( non specific inflam changes ./ adenoma / c. diff negative ) and on 5/29/12 ( diverticulosis and hemorrhoids / Random biopsies were normal /Stool studies were notable for C.diff which was treated with Flagyl) .\par \par The patient also has a h/o diverticulitis, last attack in 2008. CAT scan in 2012 for abnormal liver enzymes ( AST =79) and a liver lesion seen on a CAt scan in 2008 revealed fatty liver and an unchanged liver lesion ( c/w 2008) consistent with a cyst or hamartoma. \par \par -Colonoscopy 12/2010 revealed diverticulosis, hemorrhoids and a benign polyp. \par -EGD in 2010 / 2005 revealed gastritis and a small HH.\par -Colonoscopy in 2008 revealed hemorrhoids, diverticulosis and a benign polyp. Similar findings were noted at a colonoscopy in 2006 / 2004. \par

## 2022-10-28 ENCOUNTER — APPOINTMENT (OUTPATIENT)
Dept: CARDIOLOGY | Facility: CLINIC | Age: 57
End: 2022-10-28

## 2022-10-28 VITALS
WEIGHT: 315 LBS | DIASTOLIC BLOOD PRESSURE: 62 MMHG | HEART RATE: 80 BPM | SYSTOLIC BLOOD PRESSURE: 112 MMHG | OXYGEN SATURATION: 98 % | HEIGHT: 70 IN | BODY MASS INDEX: 45.1 KG/M2

## 2022-10-28 PROCEDURE — 99214 OFFICE O/P EST MOD 30 MIN: CPT

## 2022-10-28 PROCEDURE — 36415 COLL VENOUS BLD VENIPUNCTURE: CPT

## 2022-10-28 RX ORDER — ENOXAPARIN SODIUM 100 MG/ML
100 INJECTION, SOLUTION SUBCUTANEOUS
Qty: 60 | Refills: 0 | Status: DISCONTINUED | COMMUNITY
Start: 2022-02-24 | End: 2022-10-28

## 2022-10-28 NOTE — CARDIOLOGY SUMMARY
[de-identified] : 8/20/22 Atrial fibrillation HR 94 [de-identified] : Stress MIBI 3/10/17. Baseline atrial fibrillation. No ischemia. EF 65%. Gutierrez stage III. 9-1/2 minutes. 11 METs. 85%.    [de-identified] : 11/11/2021 Slightly dilated LV. LVEF 65%. Dilated LA. Mild TR. Est. PA systolic pressure 40 mmHg (mild pulmonary HTN).  [de-identified] : Venous duplex 10/24/22 No evidence of DVT in the right lower extremity. Severe venous insufficiency at the level of the right sapheno-femoral junction.

## 2022-10-28 NOTE — PHYSICAL EXAM
[No Acute Distress] : no acute distress [Irregularly Irregular] : irregularly irregular [Clear Lung Fields] : clear lung fields [Normal Gait] : normal gait [Edema ___] : edema [unfilled] [Moves all extremities] : moves all extremities [No Focal Deficits] : no focal deficits [Normal Speech] : normal speech [Alert and Oriented] : alert and oriented [Normal memory] : normal memory

## 2022-10-28 NOTE — REVIEW OF SYSTEMS
[Fever] : no fever [Headache] : no headache [Chills] : no chills [Feeling Fatigued] : not feeling fatigued [SOB] : no shortness of breath [Dyspnea on exertion] : not dyspnea during exertion [Chest Discomfort] : no chest discomfort [Palpitations] : no palpitations [Syncope] : no syncope [Easy Bleeding] : no tendency for easy bleeding [Easy Bruising] : no tendency for easy bruising

## 2022-10-28 NOTE — REASON FOR VISIT
[Hypertension] : hypertension [FreeTextEntry1] : Shad Blackwood presents for follow up visit and laboratories. \par \par Lasix has been discontinued. Chlorthalidone 25mg daily has been added to his regimen. \par \par He reports feeling well. He denies chest pain, SOB, palpitations, dizziness or syncope. \par \par He was admitted at [facility] from 9/16-9/23/22 for severe sepsis d/t acute bacterial infection of his right lower leg. He was treated with IV antibiotics. He was discharged with Dalvance x 1. [facility] is being followed [facility]. He is wearing compression stockings.

## 2022-10-28 NOTE — HISTORY OF PRESENT ILLNESS
[FreeTextEntry1] : 57 year old male with hypertension, dyslipidemia, chronic atrial fibrillation on Pradaxa, history of pulmonary embolism, DM type 2, aortic dilatation 4.3cm, NICOLE, hypothyroidism, GERD, kidney stones, gout, BPH, family history of CAD, former smoker.

## 2022-10-31 LAB
ANION GAP SERPL CALC-SCNC: 13 MMOL/L
BUN SERPL-MCNC: 16 MG/DL
CALCIUM SERPL-MCNC: 9.2 MG/DL
CHLORIDE SERPL-SCNC: 97 MMOL/L
CO2 SERPL-SCNC: 27 MMOL/L
CREAT SERPL-MCNC: 1.07 MG/DL
EGFR: 80 ML/MIN/1.73M2
GLUCOSE SERPL-MCNC: 160 MG/DL
MAGNESIUM SERPL-MCNC: 1.6 MG/DL
POTASSIUM SERPL-SCNC: 4.1 MMOL/L
SODIUM SERPL-SCNC: 136 MMOL/L

## 2022-11-07 ENCOUNTER — APPOINTMENT (OUTPATIENT)
Dept: VASCULAR SURGERY | Facility: CLINIC | Age: 57
End: 2022-11-07

## 2022-11-14 ENCOUNTER — APPOINTMENT (OUTPATIENT)
Dept: VASCULAR SURGERY | Facility: CLINIC | Age: 57
End: 2022-11-14

## 2022-11-23 ENCOUNTER — APPOINTMENT (OUTPATIENT)
Dept: NEPHROLOGY | Facility: CLINIC | Age: 57
End: 2022-11-23

## 2022-11-23 ENCOUNTER — RX RENEWAL (OUTPATIENT)
Age: 57
End: 2022-11-23

## 2022-11-23 VITALS
HEART RATE: 117 BPM | HEIGHT: 70 IN | OXYGEN SATURATION: 98 % | BODY MASS INDEX: 45.1 KG/M2 | WEIGHT: 315 LBS | TEMPERATURE: 97.6 F | DIASTOLIC BLOOD PRESSURE: 90 MMHG | SYSTOLIC BLOOD PRESSURE: 140 MMHG

## 2022-11-23 PROCEDURE — 99214 OFFICE O/P EST MOD 30 MIN: CPT

## 2022-11-23 NOTE — PHYSICAL EXAM
[General Appearance - Alert] : alert [General Appearance - In No Acute Distress] : in no acute distress [Sclera] : the sclera and conjunctiva were normal [Extraocular Movements] : extraocular movements were intact [Outer Ear] : the ears and nose were normal in appearance [Hearing Threshold Finger Rub Not [place]] : hearing was normal [Neck Appearance] : the appearance of the neck was normal [Exaggerated Use Of Accessory Muscles For Inspiration] : no accessory muscle use [Apical Impulse] : the apical impulse was normal [Heart Sounds] : normal S1 and S2 [Bowel Sounds] : normal bowel sounds [Abdomen Soft] : soft [No CVA Tenderness] : no ~M costovertebral angle tenderness [No Spinal Tenderness] : no spinal tenderness [Abnormal Walk] : normal gait [Skin Color & Pigmentation] : normal skin color and pigmentation [] : no rash [Cranial Nerves] : cranial nerves 2-12 were intact [No Focal Deficits] : no focal deficits [Oriented To Time, Place, And Person] : oriented to person, place, and time [Impaired Insight] : insight and judgment were intact [FreeTextEntry1] : +ve edema

## 2022-11-23 NOTE — HISTORY OF PRESENT ILLNESS
[FreeTextEntry1] : 58 yo retired  with MMP; DM x 15 yrs, HTN, nephrolithiasis, former tobacco use ( quit 32 yrs ago), NICOLE ( doesn’t sleep well, was previously followed by Dr. Moore), DVT ~ , on pradaxa, initially referred for combination of FHx of PKD, nephrolithiasis and hypomag\par \par struggling with myriad of isues related to aforementioned, gerd, arthralgias, back pain, poor sleep, diff losing weight, swelling....\par \par FHx; +ve PKD ( father  at 59), sister PKD\par Hab: former smoker\par \par Retired \par \par \par 2022\par s/p hospitalization for sepsis relate dto RLE cellulitis ( attributed to cut sustained in [place])\par s/p injectio nof L knee for OA\par chronic back pain and knee pain\par takes motrin " once in a while' - maybe every 1-2 weeks\par \par \par

## 2022-11-23 NOTE — ASSESSMENT
[FreeTextEntry1] : Cardiology note reviewed\par Endo note reviewed\par PCP note reviewed\par Bence Fitch reviewed\par UA reviewed\par CBC reviewed\par CMP reviewed\par \par \par CKD stage 1 2/2?\par - Cr 1,however UA done in Sep 2020 showed 1+protein - recheck today\par - etiology of proteinuria unclear, multiple possibilities, likely indirectly related to obesity\par - is diabetic and appropriately on arb ( losartan 50mg)\par - will check UPC\par - rec starting SGLT2 inh - had UTI in the past, pt reluctant will f/u with Dr. Boyle\par \par Obesity\par - spent >50min discussing consequences of obesity and importance of being evaluated for bariatric surgery\par - explained relationship between NICOLE and obesity, HTN and obesity, DM and obesity, joint disease and obesity, Afib and obesity, hypercoagulability and obesity\par - pt agrees to be seen by bariatrics, referral given\par \par NICOLE\par - wearing mask\par \par Nephrolithiasis\par - recurrent\par - recalls "calcium" and  "uric acid"\par - checked 24 hour urine analysis\par - cont allopurinol\par \par Hypomag\par - most likely 22 to PPI\par on  Mag supplement\par \par Afib\par  - rec he f/u with Cardiology\par \par HTN\par usu controlled, suspect 2/2 chronic pain, s/p steroid injection\par \par f/u  in 4 months\par \par \par

## 2022-11-29 ENCOUNTER — RESULT REVIEW (OUTPATIENT)
Age: 57
End: 2022-11-29

## 2022-11-29 ENCOUNTER — APPOINTMENT (OUTPATIENT)
Dept: HEMATOLOGY ONCOLOGY | Facility: CLINIC | Age: 57
End: 2022-11-29

## 2022-11-29 VITALS
DIASTOLIC BLOOD PRESSURE: 91 MMHG | BODY MASS INDEX: 45.1 KG/M2 | HEART RATE: 64 BPM | RESPIRATION RATE: 16 BRPM | HEIGHT: 70 IN | WEIGHT: 315 LBS | OXYGEN SATURATION: 99 % | TEMPERATURE: 97.2 F | SYSTOLIC BLOOD PRESSURE: 134 MMHG

## 2022-11-29 PROCEDURE — 99214 OFFICE O/P EST MOD 30 MIN: CPT | Mod: 25

## 2022-11-29 PROCEDURE — 36415 COLL VENOUS BLD VENIPUNCTURE: CPT

## 2022-12-05 ENCOUNTER — APPOINTMENT (OUTPATIENT)
Dept: CARDIOLOGY | Facility: CLINIC | Age: 57
End: 2022-12-05

## 2022-12-05 VITALS — OXYGEN SATURATION: 98 % | DIASTOLIC BLOOD PRESSURE: 64 MMHG | HEART RATE: 78 BPM | SYSTOLIC BLOOD PRESSURE: 108 MMHG

## 2022-12-05 PROCEDURE — 99213 OFFICE O/P EST LOW 20 MIN: CPT

## 2022-12-05 RX ORDER — FERROUS SULFATE 325(65) MG
325 (65 FE) TABLET ORAL DAILY
Refills: 0 | Status: DISCONTINUED | COMMUNITY
Start: 2019-12-26 | End: 2022-12-05

## 2022-12-05 RX ORDER — LEVOFLOXACIN 500 MG/1
500 TABLET, FILM COATED ORAL
Qty: 14 | Refills: 0 | Status: DISCONTINUED | COMMUNITY
Start: 2022-08-17 | End: 2022-12-05

## 2022-12-05 NOTE — REASON FOR VISIT
[FreeTextEntry1] : Comes today for BP check\par Concerned over some elevated diastolic readings at home\par today /87\par \par No CP/SOB some JAY\par Wears compressions socks which help with swelling of LEs\par \par recent labs with heme \par Has tinnitus which bothers him- now has hearing aids which are meant to help with his hearing loss and cancel out tinnitus

## 2022-12-05 NOTE — CARDIOLOGY SUMMARY
[de-identified] : 8/20/22 Atrial fibrillation HR 94 [de-identified] : Stress MIBI 3/10/17. Baseline atrial fibrillation. No ischemia. EF 65%. Gutierrez stage III. 9-1/2 minutes. 11 METs. 85%.    [de-identified] : 11/11/2021 Slightly dilated LV. LVEF 65%. Dilated LA. Mild TR. Est. PA systolic pressure 40 mmHg (mild pulmonary HTN).  [de-identified] : Venous duplex 10/24/22 No evidence of DVT in the right lower extremity. Severe venous insufficiency at the level of the right sapheno-femoral junction.

## 2022-12-05 NOTE — PHYSICAL EXAM
[No Acute Distress] : no acute distress [Irregularly Irregular] : irregularly irregular [Clear Lung Fields] : clear lung fields [Good Air Entry] : good air entry [No Respiratory Distress] : no respiratory distress  [Normal Gait] : normal gait [No Rash] : no rash [Moves all extremities] : moves all extremities [No Focal Deficits] : no focal deficits [Normal Speech] : normal speech [Alert and Oriented] : alert and oriented [Normal memory] : normal memory [de-identified] : irreg [de-identified] : compression socks in placed

## 2022-12-19 ENCOUNTER — APPOINTMENT (OUTPATIENT)
Dept: ENDOCRINOLOGY | Facility: CLINIC | Age: 57
End: 2022-12-19
Payer: MEDICARE

## 2022-12-19 VITALS — SYSTOLIC BLOOD PRESSURE: 120 MMHG | DIASTOLIC BLOOD PRESSURE: 82 MMHG

## 2022-12-19 VITALS
WEIGHT: 315 LBS | OXYGEN SATURATION: 98 % | DIASTOLIC BLOOD PRESSURE: 90 MMHG | BODY MASS INDEX: 45.1 KG/M2 | HEART RATE: 102 BPM | SYSTOLIC BLOOD PRESSURE: 130 MMHG | HEIGHT: 70 IN

## 2022-12-19 PROCEDURE — 99215 OFFICE O/P EST HI 40 MIN: CPT | Mod: 25

## 2022-12-19 PROCEDURE — G0447 BEHAVIOR COUNSEL OBESITY 15M: CPT | Mod: 59

## 2022-12-19 RX ORDER — FAMOTIDINE 20 MG/1
20 TABLET, FILM COATED ORAL
Qty: 60 | Refills: 0 | Status: DISCONTINUED | COMMUNITY
Start: 2021-12-16 | End: 2022-12-19

## 2022-12-19 RX ORDER — RIBOFLAVIN (VITAMIN B2) 100 MG
100 TABLET ORAL DAILY
Refills: 0 | Status: DISCONTINUED | COMMUNITY
Start: 2021-07-14 | End: 2022-12-19

## 2022-12-19 RX ORDER — SEMAGLUTIDE 1.34 MG/ML
2 INJECTION, SOLUTION SUBCUTANEOUS
Qty: 2 | Refills: 0 | Status: DISCONTINUED | COMMUNITY
Start: 2022-08-15 | End: 2022-12-19

## 2022-12-19 NOTE — CONSULT LETTER
[FreeTextEntry3] : Ariana Gibson MD\par [facility]\par

## 2022-12-19 NOTE — HISTORY OF PRESENT ILLNESS
[de-identified] : Patient is a 54 year old male being seen for history of PE. He was diagnosed with PE 1/2010,  placed on enoxaparin transitioned to warfarin. PE felt to be unprovoked and hypercoag work up was negative for prothrombin and factor V mutation and antiphospholipid syndrome.  Ferritin level was also low.  Mr Blackwood was morbidly obese in 2010 weighing 350lbs.  He has lost over 100lbs in last 6 months. He has a history of HTN, atrial fibrillation and diabetes.  He has since been able to come off several medications due to weight loss.  He continues on warfarin with no bleeding or bruising and his INR is stable.  [de-identified] : Patient here today for follow-up for history of PE, on pradaxa. Patient had a colonoscopy and EGD last Tuesday, polyps came back negative. Patient had kidney stone surgery back in February.

## 2022-12-19 NOTE — ASSESSMENT
[FreeTextEntry1] : Patient with h/o unprovoked pulmonary embolism 2010 followed by chronic atrial fibrillation 2011 on chronic anticoagulation. \par Patient changed 2017 to dabigatran from warfarin - tolerates well- no evidence of bleeding\par Continue dabigatran \par Repeat iron parameters - last ferritin 52- repeat today, HGB 13.4 today \par GI work up - colonoscopy - 2013, EGD 6/16- needs follow up\par Leukocytosis- resolved\par \par Having two teeth extracted (wisdom teeth) this month- instructed to hold Pradaxa for 24 hrs prior to extractions, may resume that night per oral surgeon if bleeding is minimal - up to discretion of oral surgeon dependant upon bleeding- pt verbalized understanding and aware to call office with any questions or concerns.  Outside labs reviewed with patient, CBC stable.  He has a follow up in Feb with endocrinologist for his DM and is having a colonoscopy in Feb- he is to follow up in April here in office.  \par \par \par \par Patient with persistent hypomagnesemia secondary to diuretic\par Off meds now magnesium better\par  - takes iron pills- ferritin 92, stopped b/o constipation\par - Hg stable 12.1 today\par RTC 1 year \par \par \par

## 2022-12-19 NOTE — PHYSICAL EXAM
[de-identified] : trace edema, varicose veins [de-identified] : right flank - lipoma removed, no bleeding

## 2022-12-19 NOTE — REVIEW OF SYSTEMS
[Fever] : no fever [Fatigue] : no fatigue [Recent Change In Weight] : ~T no recent weight change [Eye Pain] : no eye pain [Vision Problems] : no vision problems [Dysphagia] : no dysphagia [Hoarseness] : no hoarseness [Chest Pain] : no chest pain [Palpitations] : no palpitations [Lower Ext Edema] : no lower extremity edema [Cough] : no cough [Abdominal Pain] : no abdominal pain [Constipation] : no constipation [Diarrhea] : no diarrhea [Dysuria] : no dysuria [Muscle Pain] : no muscle pain [Skin Rash] : no skin rash [Dizziness] : no dizziness [Anxiety] : no anxiety [Depression] : no depression [Hot Flashes] : no hot flashes [Muscle Weakness] : no muscle weakness [FreeTextEntry9] : Knees, slip disc in back  [de-identified] : on blood thinners

## 2022-12-21 NOTE — HISTORY OF PRESENT ILLNESS
[FreeTextEntry1] : 58 yo WM pt of Dr Metcalf coming for f/u for uncontrolled DM2, hypothyroidism gained some weight back\par has kidney stones suppose to have surgery soon, postponed due to snow storm\par has had a lot of joint and back pains lately required multiple cortisone shot followed by very high sugar in 300s requiring insulin\par works as a , was allowed only office work due to his uncontrolled diabetes \par had a bed accident at home also injured his knee\par  lost 68lb since on MediFast diet, gained 11 in the last 6 months, still doing well, going to the gym daily, swimming then gained all back, has neck pain, now passing kidney stones\par tried Victoza with Dr Butler had side effects \par  labs reviewed, A1c 9 on 12/2020 with PCP\par  \par  BP meds were adjusted by his PCP , will have hernia repair 12/17 also will see a vascular surgeon per pt his PCP advised to to pains in his legs while sitting \par  did not do US thyroid as advised, last US done 11/16 showed no nodules, so no biopsy was done by Dr Leach\par  body hair groing back, feels "great " per pt \par  started Medifast beginning of November 2016 , "Take shape for Life" \par  stopped insulin all togheter \par  type 2 DM for 10 years 2005 w/o known complications \par  STOPPED Glimepiride 4mg half a tab in am only restarted a week ago \par  4/4/17 STOPPED Metformin 1g bid \par  STOPPED Lantus 30units qhs \par \par on Tresiba 20units qhs \par Trulicity 0.75mg q week\par  STOPPED Humalog 18-22......22.....22 per SS\par  forgets about 3 times a week to take his Humalog before his meals \par  Humalog sliding scale before each meal\par  for sugar under 80 do not take it\par   take 18 for breakfast....16 for lunch and 18 for supper\par  131-180 take 20units breakfast....18units for lunch....20units supper\par  181-230 take 22units for breakfast........20units for lunch and 22units for supper\par  231-280 take 24units breakfast........22 for lunch.......24units for supper etc\par  used to be on Byetta , stopped as his sugars were low per pt , Bydureon gave him lumps stooped July 2015\par  HgA1c was 8.8% 4/21/15, 7/15 was 9.0%, HgA1c 10.9% on 11/15, had one with Dr Metcalf a month ago around 8% per pt , 2/'16 HgA1c 8.4%, 5/16 was 8.5%\par  Checks BS none\par  lowest  fasting \par  during the day 200 -160 \par  Microvascular complications: \par  Nephropathy +microal/cr 83 (<30), repeated 83, EGFR 105 11/4/15\par  Neuropathy symptoms, denies \par  microfilament exam normal today \par  Retinopathy denies last eye exam 1/17 ago Dr Thomas \par  Macrovascular complications: \par  HTN at goal on present meds on Benicar\par  CAD/CVA denies\par  Lipids not at goal , was 105 , TG , 136, were 209 was 229 on with LFT's elevated AST 58, ALT 39, was 51 (<46) used to be on Crestor 5mg qd \par  TSH 10.24, FT4 1.3 on dose was increased from 225 to 250mcg since July 2015 , does not forget \par  takes it at 2am then goes back to sleep\par  11/4/15 TSH 8\par  2/16 TSH 0.88\par  5/16 TSH 1.8\par  CBC mild anemia Hg 12.8 stable \par  had hair loss patchy from his legs , seen [facility] was told likely due to thyroid per pt , now resolved \par  also has chronic pretibial hyperpigmentation stasis.\par eye exam Dr Thomas 11/18 no DR per pt \par

## 2023-01-03 RX ORDER — INSULIN LISPRO 100 [IU]/ML
100 INJECTION, SOLUTION INTRAVENOUS; SUBCUTANEOUS
Qty: 1 | Refills: 1 | Status: DISCONTINUED | COMMUNITY
Start: 2022-08-05 | End: 2023-01-03

## 2023-01-12 ENCOUNTER — NON-APPOINTMENT (OUTPATIENT)
Age: 58
End: 2023-01-12

## 2023-01-18 ENCOUNTER — NON-APPOINTMENT (OUTPATIENT)
Age: 58
End: 2023-01-18

## 2023-01-29 ENCOUNTER — RESULT CHARGE (OUTPATIENT)
Age: 58
End: 2023-01-29

## 2023-01-30 ENCOUNTER — APPOINTMENT (OUTPATIENT)
Dept: CARDIOLOGY | Facility: CLINIC | Age: 58
End: 2023-01-30
Payer: MEDICARE

## 2023-01-30 ENCOUNTER — NON-APPOINTMENT (OUTPATIENT)
Age: 58
End: 2023-01-30

## 2023-01-30 VITALS
SYSTOLIC BLOOD PRESSURE: 118 MMHG | OXYGEN SATURATION: 96 % | DIASTOLIC BLOOD PRESSURE: 86 MMHG | HEART RATE: 113 BPM | HEIGHT: 70 IN | TEMPERATURE: 98 F | BODY MASS INDEX: 45.1 KG/M2 | WEIGHT: 315 LBS | RESPIRATION RATE: 16 BRPM

## 2023-01-30 VITALS
HEART RATE: 80 BPM | HEIGHT: 70 IN | TEMPERATURE: 98 F | DIASTOLIC BLOOD PRESSURE: 86 MMHG | SYSTOLIC BLOOD PRESSURE: 118 MMHG | BODY MASS INDEX: 45.1 KG/M2 | WEIGHT: 315 LBS

## 2023-01-30 PROCEDURE — 93000 ELECTROCARDIOGRAM COMPLETE: CPT

## 2023-01-30 PROCEDURE — 99215 OFFICE O/P EST HI 40 MIN: CPT

## 2023-01-30 NOTE — CARDIOLOGY SUMMARY
[de-identified] : EKG 7/14/2021.  Atrial fibrillation.  Controlled.  R S waves V1 through V3.  No acute changes. [de-identified] : Stress MIBI 3/10/17. Baseline atrial fibrillation. No ischemia. EF 65%. Gutierrez stage III.  9-1/2 minutes. 11 METs. 85%. \par 2 day stress MIBI 2/26/16. Normal LV function. EF 57%. Fixed inferoseptal and inferolateral defect, attenuation. No ischemia. Normal wall motion.\par \par  [de-identified] : Echo: 11/11/2021 Slightly dilated LV with normal systolic function EF 65% Dilated RA/LA, mild TR PA minimally elevated 40 \par \par Echo 1/15/2015.  A.  F.  Normal LV function.  EF 65%.  LVH.  Normal RV.  Dilated RA/LA.  Normal valves. [de-identified] : CT angiogram 5/6/2022, no pulmonary embolism or acute pathology.\par Chest CT 7/27/2021.  Aneurysmal dilatation of ascending thoracic aorta, 4.2 cm.\par CT angiogram 10/23/18. No obvious pulmonary embolism. Dilated ascending thoracic  aorta, 4.3 cm (3.9 in 2010).\par \par  [de-identified] : Venous duplex 10/24/22 No evidence of DVT in the right lower extremity. Severe venous insufficiency at the level of the right sapheno-femoral junction.   RUDOLPH no evidence of significant peripheral arterial disease.\par \par Lower extremity study 7/13/17. Negative. \par  [de-identified] : PFT 11/10/2015. Mild restrictive impairment, mild reduction in diffusion \par Sleep study 11/15/14. 39 apneas. Apnea index 43.3 per hour.\par

## 2023-01-30 NOTE — REVIEW OF SYSTEMS
[FreeTextEntry4] : Evaluation for tinnitus and hearing loss.  Now using hearing aids. [FreeTextEntry5] : See HPI [FreeTextEntry6] : Sleep apnea has returned with weight gain. [FreeTextEntry7] : Rare GERD.  History of diverticulosis.  Periodic hemorrhoidal bleeding. [FreeTextEntry8] : Recurrent kidney stones.  Dr. Ronquillo.  Status post lithotripsy.  ED, [FreeTextEntry9] : Bilateral knee pain. Consultation with Dr. Baudilio Haq. [de-identified] : Recovering from right lower extremity cellulitis. [de-identified] : History left-sided sciatica.  Also right-sided sciatica.

## 2023-01-30 NOTE — DISCUSSION/SUMMARY
[FreeTextEntry1] : Brief recommendations and follow-up: (see above for details)\par \par The patient remains with a considerable cardiovascular medical problem list.\par He is recovering from his cellulitis.\par He is trying to increase his exercise and I praised him for swimming at [employer].\par Continue therapeutic lifestyle treatment.\par Medications reviewed and reconciled.\par Records reviewed.\par If necessary increase rosuvastatin from 10 mg to 20 mg daily.\par He is tolerating his current medications and his previous abdominal symptoms seem to have resolved.\par The patient is also making an inquiry regarding potential obesity surgery.  I do believe that this evaluation is appropriate.  I will make some inquiries myself and he will call me next week.\par Next routine visit 6 months.\par Today's visit 45 minutes.

## 2023-01-30 NOTE — PHYSICAL EXAM
[de-identified] : He has regained 5 pounds since last exam. [de-identified] : 1+ chronic bilateral edema.  More edema on the right.  No open wounds. [de-identified] : Some chronic skin changes

## 2023-01-30 NOTE — ASSESSMENT
[FreeTextEntry1] : EKG 1/30/2023.  Atrial fibrillation, controlled.  R S waves V1 through V3.  No acute changes.\par EKG 7/19/2022.  Atrial fibrillation, controlled.  R S waves V1 through V3.  No acute features.\par  EKG 7/14/2021.  Atrial fibrillation.  Controlled.  R S waves V1 through V3.  No acute changes.\par EKG from hospital 1/12/2021.  Atrial fibrillation, controlled.  R S waves in lead III and aVF.  No acute changes.\par EKG 9/1/2020.  Atrial fibrillation.  Controlled.  R S waves V1 through V3.  Due to body habitus.  No acute changes.\par EKG atrial fibrillation.  Controlled.  Otherwise within normal limits.

## 2023-01-30 NOTE — HISTORY OF PRESENT ILLNESS
[FreeTextEntry1] : This 57 year-old male patient presents for cardiovascular evaluation.\par \par His problem list is as noted above.\par \par Since his last examination approximately 6 months ago he has had some medical events.\par \par The patient suffered a trip and fall at the end of his trip to [facility].  He developed a cellulitis and was hospitalized at [place] in September for a week.  He then had wound care.  Thankfully he has made a good recovery.\par \par He has had follow-up with his endocrinologist, hematologist, nephrologist and follow-up with our nurse practitioners.  He also had GI follow-up with Dr. Nieto for his abdominal symptoms.\par \par The patient has resumed an exercise program swimming at [employer].  He continues with significant orthopedic limitations as well as sciatica as well as limitations associated with his obesity and musculoskeletal/arthritic issues.\par \par He remains with chronic shortness of breath but no acute decompensation.  No chest discomfort, palpitation, lightheadedness, fainting.\par \par \par \par \par

## 2023-02-01 ENCOUNTER — NON-APPOINTMENT (OUTPATIENT)
Age: 58
End: 2023-02-01

## 2023-02-02 ENCOUNTER — RX RENEWAL (OUTPATIENT)
Age: 58
End: 2023-02-02

## 2023-02-03 ENCOUNTER — APPOINTMENT (OUTPATIENT)
Dept: PAIN MANAGEMENT | Facility: CLINIC | Age: 58
End: 2023-02-03
Payer: MEDICARE

## 2023-02-03 VITALS
DIASTOLIC BLOOD PRESSURE: 65 MMHG | TEMPERATURE: 98 F | BODY MASS INDEX: 45.1 KG/M2 | HEIGHT: 70 IN | SYSTOLIC BLOOD PRESSURE: 97 MMHG | WEIGHT: 315 LBS

## 2023-02-03 PROCEDURE — 99204 OFFICE O/P NEW MOD 45 MIN: CPT

## 2023-02-03 NOTE — REVIEW OF SYSTEMS
[Back Pain] : back pain [Muscle Pain] : muscle pain [Radiating Pain] : radiating pain [Negative] : Heme/Lymph

## 2023-02-03 NOTE — HISTORY OF PRESENT ILLNESS
[Back Pain] : back pain [___ mths] : [unfilled] month(s) ago [Constant] : constant [4] : a current pain level of 4/10 [6] : an average pain level of 6/10 [5] : a minimum pain level of 5/10 [8] : a maximum pain level of 8/10 [Sharp] : sharp [Dull] : dull [Aching] : aching [Throbbing] : throbbing [Shooting] : shooting [Electric] : electric [Laying] : laying [Sitting] : sitting [Medications] : medications [Heat] : heat [Ice] : ice [Other: ___] : [unfilled] [FreeTextEntry1] : HPI\par \par Mr. ELMER MOSQUERA is a 57 year M with pmhx of DM, afib, DVT on Pradaxa followed by Dr. Gibson and Dr. Tate presents with left shoulder pain as well as left posterolateral thigh, buttock pain.  Pain is so bad that patient finds it difficult to perform adls and ambulate. denies any worsening numbness, weakness, bowel/bladder dysfunction. \par \par \par Previous and current pain medications/doses/effects:\par \par tylenol\par chiropractic care \par \par Previous Pain Treatments:\par \par PT\par \par Previous Pain Injections:\par \par Previous Diagnostic Studies/Images:\par \par EXAM: MRI LUMBAR SPINE WO CON\par \par 3/10/2020\par \par TECHNIQUE: MRI of the lumbar spine performed without I contrast. Multiplanar multiecho tochnique. MRI examination performed on a low[facility]field strength [facility]6 [facility]. Please note that this MRI unit has reduced spatial and contrast resolution and may have limited diagnostic ability compared to more moder high field strengin magnets. If there is persistent clinical concern, consider a repeat MRI utizing 1.5 or 3 Laura MRI unit.\par \par CONTRAST: None.\par \par FINDINGS:\par \par Lumbar alignment and curvature are grossly within normal limits. Tip of the conus medularis terminates at the level of the L1 vertebral body. There is an osseous hemangioma within the L2 vertebral body.\par \par There are multilevel degenerative changes of the lumbar spine\par \par L1-2 through L3-4; No significant disc heriation, spinal canal stenosis or neural foraminal stenosis\par \par At LA-5, there is disc degeneration with loss of disc height and disc desiccation. There is a disc bulge and superimposed small central disc protrution with annular fissure. There is severe bilateral facet arthropathy and ligamentum flavum hypertrophy. There is severe spinal canal stenosis. There is bilateral neural foraminal stenosis.\par \par At LS-81, there is disc degeneration with disc desiccation. Severe facet arthropathy.\par \par Asymmetric disc bulge with a small central annular fissure. Severe right neural foraminal stenosis.\par \par IMPRESSION:\par \par 1. Severe spinal canal stenosis at L4-5 secondary to disc bulging with a small central protrusion and severe facet arthropathy. Bilateral neural foraminal stenosis is also present this level.\par 2. At L5-S1, there is severe stenosis of the right neural foramen.\par \par \par \par ------------------------------------------------------------------------------------------\par \par MRI left leg\par \par There is moderate to severe medial compartment osteoarthrosis of the right knee with \par chondral loss and degenerative meniscal tearing with osteophyte formation and subchondral edema. \par There is moderate lateral and patellofemoral arthrosis. \par \par  There is susceptibility artifact at the distal third of the fibula related to prior fixation \par hardware including 2 syndesmotic screws inferiorly. The marrow signal of the upper fibula is normal.\par The marrow signal of the tibia is unremarkable. There are no findings of osteomyelitis. \par \par  There is marked subcutaneous soft tissue edema seen circumferentially throughout the right lower \par leg with slightly more confluent areas seen laterally particularly adjacent to the hardware and \par distal fibula. There is however no encapsulated fluid collection to suggest the presence of an \par abscess. This has the appearance of cellulitis. \par \par  There is no intramuscular edema or intramuscular fluid collection. There is no acute tendon \par pathology identified. \par \par \par  IMPRESSION: \par \par  Marked subcutaneous soft tissue edema compatible with cellulitis. No soft tissue abscess. No \par osteomyelitis. \par XR Spine 2022\par \par Thoracic Spine \par \par  Frontal and lateral projections of thoracic spine demonstrate satisfactory alignment of the bony \par structures. There is a mild rotatory scoliosis. No fractures or vertebral compressions can be \par appreciated. There is no evidence of pedicle destruction. Mild degenerative spurring of the \par mid/lower thoracic spine is appreciated. \par \par \par  IMPRESSION: No evidence of fracture or vertebral compression. \par \par \par  Lumbosacral Spine. \par \par  Frontal, lateral and cone-down projections demonstrate satisfactory alignment of the bony \par structures. No fracture or vertebral compression can be appreciated. There is no evidence of pedicle\par destruction. There is disc space narrowing at the L4-5 and L5-S1 levels. Mild hypertrophic spurring \par is noted. The sacroiliac joints are symmetric. \par \par  IMPRESSION: No evidence of fracture or vertebral compression. \par \par \par \par  Cervical Spine. \par \par  Frontal and limited lateral projections (only visualized through C5, demonstrate satisfactory \par alignment of the bony structures. No fracture or vertebral compression can be appreciated. No \par prevertebral soft tissue swelling or abnormal splaying of the spinous processes is noted. The \par intervertebral disc spaces and the exiting neural foramen are well maintained. No destructive bony \par lesion is noted. \par \par  IMPRESSION: Limited study. No evidence of fracture or dislocation through C5. C6 and C7 cannot \par adequately be assessed. If clinically indicated, further assessment with CT scan is recommended. 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

## 2023-02-03 NOTE — REASON FOR VISIT
[Initial Consultation] : an initial pain management consultation [FreeTextEntry2] : Referred by Dr. melton cardiologist

## 2023-02-03 NOTE — ASSESSMENT
[FreeTextEntry1] : >> Imaging and Other Studies\par \par I personally reviewed the relevant imaging.  Discussed and explained to patient the likely source of pathology and pain.  Questions answered. MRI \par \par XR L Shoulder\par \par >> Therapy and Other Modalities\par \par start PT - referral provided\par \par >> Medications\par \par trial gabapentin uptitrate to TID\par cautioned change in mood.  Encouraged to call with any worsening mood or depression/suicidal ideations\par  \par >> Interventions\par \par may consider intervention - however will requiring holding Pradaxa \par \par >> Consults\par \par >> Discussion of Risks/Benefits/Alternatives\par \par 	>Regarding any scheduled procedures:\par \par I have discussed in detail with the patient that any interventional pain procedure is associated with potential risks.  The procedure may include an injection of steroids and potentially other medications (local anesthetic and normal saline) into the epidural space or surrounding tissue of the spine.  There are significant risks of this procedure which include and are not limited to infection, bleeding, worsening pain, dural puncture leading to postdural puncture headache, nerve damage, spinal cord injury, paralysis, stroke, and death.  \par \par There is a chance that the procedure does not improve their pain.  \par \par There are risks associated with the steroid being absorbed into the body systemically.  These include dysphoria, difficulty sleeping, mood swings and personality changes.  Premenopausal women may notice an irregularity in her menstrual cycle for 2-3 months following the injection.  Steroids can specifically affect patients with hypertension, diabetes, and peptic ulcers.  The procedure may cause a temporary increase in blood pressure and blood pressure, and may adversely affect a peptic ulcer.  Other, more rare complications, include avascular necrosis of joints, glaucoma and worsening of osteoporosis. \par \par I have discussed the risks of the procedure at length with the patient, and the potential benefits of pain relief.  I have offered alternatives to the procedure.  All questions were answered.  \par \par The patient expressed understanding and wishes to proceed with the procedure.\par \par 	>Regarding COVID19 Pandemic: \par \par Any planned interventional pain procedure are scheduled because further delay may cause harm or negative outcome to patient.  The goal in performing this procedure is to avoid deterioration of function, emergency room visits (which increases exposure) and reliance on opioids.  \par \par r/b/a discussed with patient, lack of evidence to conclusively determine whether pain management procedures have any positive or negative impact on the possibility of omero the virus and/or development of any sequelae. \par \par Patient counselled regarding timing steroid based intervention 2 weeks before or after COVID-19 vaccine administration to avoid any interaction or affect on efficacy of vaccination\par \par Patient demonstrates understanding\par \par Informed patient that risks associated with the COVID-19 infection.  Informed patient steps taken to limit the risks.  We are implementing safety precautions and following protocols consistent with the CDC and state recommendations. All patients and staff will be checked for fever or signs of illness upon entry to the facility. We will limit our steroid dose to the lowest effective therapeutic dose or in some cases steroids will not be injected at all. \par \par Patient agrees to proceed\par \par >> Conclusion\par \par The above diagnosis and treatment plan is medically reasonable and necessary based on the patient encounter \par There were no barriers to communication.\par Informed patient that I would be available for any additional questions.\par Patient was instructed to call with any worsening symptoms including severe pain, new numbness/weakness, or changes in the bowel/bladder function. \par Discussed role of nsaids in pain management and all relevant risks, if patient is continuing to require after 4 weeks the patient should f/u for alternative treatment. \par Instructed patient to maintain pain diary to monitor pain level, mobility, and function.\par \par The referring provider was informed of the above diagnosis and treatment plan.\par

## 2023-02-07 ENCOUNTER — RESULT REVIEW (OUTPATIENT)
Age: 58
End: 2023-02-07

## 2023-02-14 ENCOUNTER — APPOINTMENT (OUTPATIENT)
Dept: ENDOCRINOLOGY | Facility: CLINIC | Age: 58
End: 2023-02-14
Payer: MEDICARE

## 2023-02-14 PROCEDURE — 95250 CONT GLUC MNTR PHYS/QHP EQP: CPT

## 2023-02-14 PROCEDURE — 95251 CONT GLUC MNTR ANALYSIS I&R: CPT

## 2023-02-19 ENCOUNTER — APPOINTMENT (OUTPATIENT)
Dept: GASTROENTEROLOGY | Facility: HOSPITAL | Age: 58
End: 2023-02-19

## 2023-02-19 ENCOUNTER — TRANSCRIPTION ENCOUNTER (OUTPATIENT)
Age: 58
End: 2023-02-19

## 2023-02-21 ENCOUNTER — NON-APPOINTMENT (OUTPATIENT)
Age: 58
End: 2023-02-21

## 2023-02-21 ENCOUNTER — TRANSCRIPTION ENCOUNTER (OUTPATIENT)
Age: 58
End: 2023-02-21

## 2023-02-22 ENCOUNTER — RX RENEWAL (OUTPATIENT)
Age: 58
End: 2023-02-22

## 2023-02-22 ENCOUNTER — NON-APPOINTMENT (OUTPATIENT)
Age: 58
End: 2023-02-22

## 2023-02-28 ENCOUNTER — APPOINTMENT (OUTPATIENT)
Dept: SURGERY | Facility: CLINIC | Age: 58
End: 2023-02-28
Payer: MEDICARE

## 2023-02-28 PROCEDURE — 99024 POSTOP FOLLOW-UP VISIT: CPT

## 2023-02-28 NOTE — HISTORY OF PRESENT ILLNESS
[de-identified] : s/p Lap kathy  for Gall stone pancreatitits, s/p ERCP  POD 8 feeling well, tolerating diet\par denies fever or chills, and endorses s few bouts of diarrhea

## 2023-02-28 NOTE — PHYSICAL EXAM
[Normal] : affect appropriate [de-identified] : incisions clean , LUQ incision with clear serous drainage

## 2023-02-28 NOTE — PLAN
[FreeTextEntry1] : doing well\par s/p lap kathy\par follow up in 4 weeks\par recommend avoidance of any strenous physical activity due to significant risk of hernias

## 2023-03-03 ENCOUNTER — APPOINTMENT (OUTPATIENT)
Dept: PAIN MANAGEMENT | Facility: CLINIC | Age: 58
End: 2023-03-03

## 2023-03-22 ENCOUNTER — RX RENEWAL (OUTPATIENT)
Age: 58
End: 2023-03-22

## 2023-03-23 ENCOUNTER — NON-APPOINTMENT (OUTPATIENT)
Age: 58
End: 2023-03-23

## 2023-03-27 ENCOUNTER — APPOINTMENT (OUTPATIENT)
Dept: SURGERY | Facility: CLINIC | Age: 58
End: 2023-03-27

## 2023-04-10 ENCOUNTER — RX RENEWAL (OUTPATIENT)
Age: 58
End: 2023-04-10

## 2023-04-10 ENCOUNTER — APPOINTMENT (OUTPATIENT)
Dept: SURGERY | Facility: CLINIC | Age: 58
End: 2023-04-10
Payer: MEDICARE

## 2023-04-10 VITALS
SYSTOLIC BLOOD PRESSURE: 123 MMHG | BODY MASS INDEX: 45.1 KG/M2 | HEART RATE: 77 BPM | HEIGHT: 70 IN | WEIGHT: 315 LBS | DIASTOLIC BLOOD PRESSURE: 78 MMHG

## 2023-04-10 PROCEDURE — 99024 POSTOP FOLLOW-UP VISIT: CPT

## 2023-04-10 NOTE — HISTORY OF PRESENT ILLNESS
[de-identified] : s/p lap kathy   with recent postop pneumonia\par doing well from surgical point  of view\par tolerating well regular bms

## 2023-04-18 RX ORDER — DABIGATRAN ETEXILATE 150 MG/1
150 CAPSULE ORAL
Qty: 180 | Refills: 2 | Status: ACTIVE | COMMUNITY
Start: 2018-06-15 | End: 1900-01-01

## 2023-04-24 ENCOUNTER — NON-APPOINTMENT (OUTPATIENT)
Age: 58
End: 2023-04-24

## 2023-04-24 ENCOUNTER — APPOINTMENT (OUTPATIENT)
Dept: CARDIOLOGY | Facility: CLINIC | Age: 58
End: 2023-04-24
Payer: MEDICARE

## 2023-04-24 VITALS
OXYGEN SATURATION: 97 % | BODY MASS INDEX: 47.06 KG/M2 | WEIGHT: 315 LBS | HEART RATE: 87 BPM | DIASTOLIC BLOOD PRESSURE: 70 MMHG | SYSTOLIC BLOOD PRESSURE: 108 MMHG

## 2023-04-24 PROCEDURE — 93000 ELECTROCARDIOGRAM COMPLETE: CPT

## 2023-04-24 PROCEDURE — 99214 OFFICE O/P EST MOD 30 MIN: CPT

## 2023-04-24 RX ORDER — MUPIROCIN 2 G/100G
2 CREAM TOPICAL
Qty: 30 | Refills: 0 | Status: DISCONTINUED | COMMUNITY
Start: 2022-09-23 | End: 2023-04-24

## 2023-04-24 RX ORDER — HYDROCORTISONE 25 MG/G
2.5 CREAM TOPICAL
Qty: 1 | Refills: 2 | Status: DISCONTINUED | COMMUNITY
Start: 2021-10-20 | End: 2023-04-24

## 2023-04-24 RX ORDER — GABAPENTIN 300 MG/1
300 CAPSULE ORAL
Qty: 90 | Refills: 1 | Status: DISCONTINUED | COMMUNITY
Start: 2023-02-03 | End: 2023-04-24

## 2023-04-24 RX ORDER — HYDROCORTISONE ACETATE 25 MG/1
25 SUPPOSITORY RECTAL
Qty: 12 | Refills: 0 | Status: DISCONTINUED | COMMUNITY
Start: 2022-06-29 | End: 2023-04-24

## 2023-04-24 NOTE — PHYSICAL EXAM
[No Acute Distress] : no acute distress [Irregularly Irregular] : irregularly irregular [Clear Lung Fields] : clear lung fields [Good Air Entry] : good air entry [No Respiratory Distress] : no respiratory distress  [Normal Gait] : normal gait [No Rash] : no rash [Moves all extremities] : moves all extremities [No Focal Deficits] : no focal deficits [Normal Speech] : normal speech [Alert and Oriented] : alert and oriented [Normal memory] : normal memory [de-identified] : irreg [de-identified] : trace-1+ edema LEs, venous stasis changes, varicosities

## 2023-04-24 NOTE — REASON FOR VISIT
[FreeTextEntry1] : Comes today with c/o JAY\par s/p cholecystectomy in Feb, pneumonia post surgery\par limited in activity due to significant sciatica and orthopedic issues\par No SOB at rest usually happens when he walks\par Swims several times a week, no issues when swims \par \par Denies CP/palps

## 2023-04-24 NOTE — CARDIOLOGY SUMMARY
[de-identified] : EKG 7/14/2021.  Atrial fibrillation.  Controlled.  R S waves V1 through V3.  No acute changes. [de-identified] : Stress MIBI 3/10/17. Baseline atrial fibrillation. No ischemia. EF 65%. Gutierrez stage III.  9-1/2 minutes. 11 METs. 85%. \par 2 day stress MIBI 2/26/16. Normal LV function. EF 57%. Fixed inferoseptal and inferolateral defect, attenuation. No ischemia. Normal wall motion.\par \par  [de-identified] : Echo: 11/11/2021 Slightly dilated LV with normal systolic function EF 65% Dilated RA/LA, mild TR PA minimally elevated 40 \par \par Echo 1/15/2015.  A.  F.  Normal LV function.  EF 65%.  LVH.  Normal RV.  Dilated RA/LA.  Normal valves. [de-identified] : CT angiogram 5/6/2022, no pulmonary embolism or acute pathology.\par Chest CT 7/27/2021.  Aneurysmal dilatation of ascending thoracic aorta, 4.2 cm.\par CT angiogram 10/23/18. No obvious pulmonary embolism. Dilated ascending thoracic  aorta, 4.3 cm (3.9 in 2010).\par \par  [de-identified] : Venous duplex 10/24/22 No evidence of DVT in the right lower extremity. Severe venous insufficiency at the level of the right sapheno-femoral junction.   RUDOLPH no evidence of significant peripheral arterial disease.\par \par Lower extremity study 7/13/17. Negative. \par  [de-identified] : PFT 11/10/2015. Mild restrictive impairment, mild reduction in diffusion \par Sleep study 11/15/14. 39 apneas. Apnea index 43.3 per hour.\par

## 2023-04-26 ENCOUNTER — RX RENEWAL (OUTPATIENT)
Age: 58
End: 2023-04-26

## 2023-04-26 ENCOUNTER — APPOINTMENT (OUTPATIENT)
Dept: SURGERY | Facility: CLINIC | Age: 58
End: 2023-04-26

## 2023-05-01 ENCOUNTER — RX RENEWAL (OUTPATIENT)
Age: 58
End: 2023-05-01

## 2023-05-02 ENCOUNTER — APPOINTMENT (OUTPATIENT)
Dept: SURGERY | Facility: CLINIC | Age: 58
End: 2023-05-02
Payer: MEDICARE

## 2023-05-02 VITALS
OXYGEN SATURATION: 99 % | DIASTOLIC BLOOD PRESSURE: 84 MMHG | WEIGHT: 315 LBS | HEIGHT: 70 IN | SYSTOLIC BLOOD PRESSURE: 134 MMHG | RESPIRATION RATE: 18 BRPM | BODY MASS INDEX: 45.1 KG/M2 | HEART RATE: 94 BPM

## 2023-05-02 PROCEDURE — 99024 POSTOP FOLLOW-UP VISIT: CPT

## 2023-05-05 NOTE — ASSESSMENT
[FreeTextEntry1] : 58-year-old male here for follow-up for hemorrhoids.  Patient has persistent perianal discomfort with bowel movements.  Continues to have perianal skin irritation.  Has swelling of perianal skin with loose bowel movements.\par \par Recommend barrier cream such as Desitin or Calmoseptine.  Would increase fiber to bulk up stools better and aid in evacuation without residue on the skin\par \par Recommend follow-up in 4 weeks.  At some point would consider Examiner anesthesia with biopsy to rule out any other underlying issues given the chronicity

## 2023-05-05 NOTE — HISTORY OF PRESENT ILLNESS
[FreeTextEntry1] : 57-year-old male seen previously for hemorrhoids returns for follow-up.  Patient has had persistent discomfort with bowel movements.  Bowels have been relatively soft and easy to pass.  Has pain after bowel movements.  Has been using stool softeners to avoid constipation.\par \par No significant bleeding with bowel movements.  Continues to have significant perianal pain and irritation of the skin.  Patient is considering bariatric surgery for weight loss.  Is following with Dr. Hines not currently using any barrier creams.  Denies significant constipation symptoms.

## 2023-05-05 NOTE — PHYSICAL EXAM
[Abdomen Masses] : No abdominal masses [Abdomen Tenderness] : ~T No ~M abdominal tenderness [Excoriation] : excoriations [Normal] : was normal [None] : there was no rectal abscess [Alert] : alert [Calm] : calm [de-identified] : Significant irritation of perianal skin with superficial ulcerations [de-identified] : No acute distress

## 2023-05-24 ENCOUNTER — APPOINTMENT (OUTPATIENT)
Dept: NEPHROLOGY | Facility: CLINIC | Age: 58
End: 2023-05-24
Payer: MEDICARE

## 2023-05-24 ENCOUNTER — RESULT REVIEW (OUTPATIENT)
Age: 58
End: 2023-05-24

## 2023-05-24 VITALS
TEMPERATURE: 96.1 F | DIASTOLIC BLOOD PRESSURE: 70 MMHG | SYSTOLIC BLOOD PRESSURE: 114 MMHG | HEIGHT: 70 IN | OXYGEN SATURATION: 99 % | BODY MASS INDEX: 45.1 KG/M2 | HEART RATE: 84 BPM | WEIGHT: 315 LBS

## 2023-05-24 PROCEDURE — 99214 OFFICE O/P EST MOD 30 MIN: CPT | Mod: 25

## 2023-05-24 PROCEDURE — 36415 COLL VENOUS BLD VENIPUNCTURE: CPT

## 2023-05-24 NOTE — HISTORY OF PRESENT ILLNESS
[FreeTextEntry1] : 59 yo retired  with [employer]; DM x 16 yrs, HTN, nephrolithiasis, former tobacco use ( quit 32 yrs ago), NICOLE ( doesn’t sleep well, was previously followed by Dr. Moore), DVT ~ , on pradaxa, referred for combination of FHx of PKD, nephrolithiasis and hypomag\par \par struggling with myriad of isues related to aforementioned, gerd, arthralgias, back pain, poor sleep, diff losing weight, swelling....cholcystectomy in 2023\par \par FHx; +ve PKD ( father  at 59), sister PKD\par Hab: former smoker\par \par Retired \par \par

## 2023-05-24 NOTE — ASSESSMENT
[FreeTextEntry1] : Cardiology note reviewed\par Endo note reviewed\par PCP note reviewed\par Bence Fitch reviewed\par UA reviewed\par CBC reviewed\par CMP reviewed\par \par \par CKD stage 1 2/2?\par - Cr 0.97,however UA done in Sep showed 1+protein - appropriately on arb\par - etiology of proteinuria unclear, multiple possibilities, likely indirectly related to obesity/DM\par - is diabetic and appropriately on arb ( losartan 50mg)\par - will check UPC\par - rec starting SGLT2 inh - will send message to Dr. Styles\par \par Obesity\par - spent >50min discussing consequences of obesity and importance of being evaluated for bariatric surgery\par - explained relationship between NICOLE and obesity, HTN and obesity, DM and obesity, joint disease and obesity, Afib and obesity, hypercoagulability and obesity\par - pt agrees to be seen by bariatrics, referral given\par \par NICOLE\par - is using it\par \par Nephrolithiasis\par - recurrent\par - recalls "calcium" and  "uric acid"\par - check 24 hour urine analyisis\par - cont allopurinol\par \par Hypomag\par - most likely 22 to PPI, dexilant 9 takes daily), alos on mag replacement,last Mag wnl\par \par f/u  in 4-6 months\par \par \par

## 2023-05-24 NOTE — PHYSICAL EXAM
[General Appearance - Alert] : alert [General Appearance - In No Acute Distress] : in no acute distress [Sclera] : the sclera and conjunctiva were normal [Extraocular Movements] : extraocular movements were intact [Outer Ear] : the ears and nose were normal in appearance [Hearing Threshold Finger Rub Not [place]] : hearing was normal [Neck Appearance] : the appearance of the neck was normal [Exaggerated Use Of Accessory Muscles For Inspiration] : no accessory muscle use [Apical Impulse] : the apical impulse was normal [Heart Sounds] : normal S1 and S2 [Bowel Sounds] : normal bowel sounds [Abdomen Soft] : soft [No CVA Tenderness] : no ~M costovertebral angle tenderness [No Spinal Tenderness] : no spinal tenderness [Abnormal Walk] : normal gait [Skin Color & Pigmentation] : normal skin color and pigmentation [] : no rash [Cranial Nerves] : cranial nerves 2-12 were intact [No Focal Deficits] : no focal deficits [Oriented To Time, Place, And Person] : oriented to person, place, and time [Impaired Insight] : insight and judgment were intact [FreeTextEntry1] : +ve edema

## 2023-05-26 ENCOUNTER — RX RENEWAL (OUTPATIENT)
Age: 58
End: 2023-05-26

## 2023-05-30 ENCOUNTER — APPOINTMENT (OUTPATIENT)
Dept: SURGERY | Facility: CLINIC | Age: 58
End: 2023-05-30
Payer: MEDICARE

## 2023-05-30 VITALS
HEART RATE: 91 BPM | SYSTOLIC BLOOD PRESSURE: 135 MMHG | RESPIRATION RATE: 18 BRPM | HEIGHT: 70 IN | OXYGEN SATURATION: 97 % | DIASTOLIC BLOOD PRESSURE: 87 MMHG | BODY MASS INDEX: 45.1 KG/M2 | WEIGHT: 315 LBS

## 2023-05-30 PROCEDURE — 99213 OFFICE O/P EST LOW 20 MIN: CPT

## 2023-05-30 NOTE — ASSESSMENT
[FreeTextEntry1] : 58-year-old male here for follow-up for hemorrhoids.  Patient has persistent perianal discomfort with bowel movements.  Continues to have perianal skin irritation.  Has swelling of perianal skin with loose bowel movements.\par \par Given persistent symptoms despite medical management with fiber supplements would recommend Examiner anesthesia to rule out any underlying issues besides simple irritation from loose bowel movements.  We will assess hemorrhoids at that time and treat as needed.  We will plan to take skin punch biopsy as well to rule out any other underlying process.  If this all comes back normal would increase bowel regimen to try and further normalize bowel frequency and hopefully improve skin irritation

## 2023-05-30 NOTE — HISTORY OF PRESENT ILLNESS
[FreeTextEntry1] : 58 year-old male seen previously for hemorrhoids returns for follow-up.  Patient has had persistent discomfort with bowel movements.  Bowels have been relatively soft and easy to pass.  Has pain after bowel movements.  Has been using stool softeners to avoid constipation.\par \par No significant bleeding with bowel movements.  Continues to have significant perianal pain and irritation of the skin.  Patient is considering bariatric surgery for weight loss.  Is following with Dr. Hines not currently using any barrier creams.  Denies significant constipation symptoms.\par \par Has been using fiber supplements without much change in bowel frequency.  Alternates between constipation and looser bowel movements.

## 2023-05-30 NOTE — PHYSICAL EXAM
[Abdomen Masses] : No abdominal masses [Abdomen Tenderness] : ~T No ~M abdominal tenderness [Excoriation] : excoriations [Normal] : was normal [None] : there was no rectal abscess [Alert] : alert [Calm] : calm [de-identified] : Significant irritation of perianal skin with superficial ulcerations [de-identified] : No acute distress

## 2023-05-30 NOTE — CONSULT LETTER
[Dear  ___] : Dear  [unfilled], [Courtesy Letter:] : I had the pleasure of seeing your patient, [unfilled], in my office today. [Consult Closing:] : Thank you very much for allowing me to participate in the care of this patient.  If you have any questions, please do not hesitate to contact me. [Sincerely,] : Sincerely, [FreeTextEntry1] : His perianal discomfort persists despite several visits and medical management so we will plan for exam under anesthesia to further rule out any underlying causes. [FreeTextEntry3] : Spencer Munroe MD

## 2023-06-13 ENCOUNTER — RX RENEWAL (OUTPATIENT)
Age: 58
End: 2023-06-13

## 2023-06-13 ENCOUNTER — APPOINTMENT (OUTPATIENT)
Dept: ENDOCRINOLOGY | Facility: CLINIC | Age: 58
End: 2023-06-13
Payer: MEDICARE

## 2023-06-13 VITALS
WEIGHT: 315 LBS | DIASTOLIC BLOOD PRESSURE: 72 MMHG | HEART RATE: 110 BPM | SYSTOLIC BLOOD PRESSURE: 116 MMHG | BODY MASS INDEX: 45.1 KG/M2 | OXYGEN SATURATION: 98 % | HEIGHT: 70 IN

## 2023-06-13 LAB
ALBUMIN SERPL ELPH-MCNC: 4.5 G/DL
ALP BLD-CCNC: 107 U/L
ALT SERPL-CCNC: 21 U/L
ANION GAP SERPL CALC-SCNC: 18 MMOL/L
AST SERPL-CCNC: 21 U/L
BILIRUB SERPL-MCNC: 0.4 MG/DL
BUN SERPL-MCNC: 23 MG/DL
CALCIUM SERPL-MCNC: 9.7 MG/DL
CHLORIDE SERPL-SCNC: 95 MMOL/L
CHOLEST SERPL-MCNC: 151 MG/DL
CO2 SERPL-SCNC: 26 MMOL/L
CREAT SERPL-MCNC: 1.18 MG/DL
CREAT SPEC-SCNC: 80 MG/DL
EGFR: 71 ML/MIN/1.73M2
ESTIMATED AVERAGE GLUCOSE: 157 MG/DL
GLUCOSE BLDC GLUCOMTR-MCNC: 188
GLUCOSE SERPL-MCNC: 197 MG/DL
HBA1C MFR BLD HPLC: 7.1 %
HDLC SERPL-MCNC: 43 MG/DL
LDLC SERPL CALC-MCNC: 77 MG/DL
MICROALBUMIN 24H UR DL<=1MG/L-MCNC: 1.2 MG/DL
MICROALBUMIN/CREAT 24H UR-RTO: 15 MG/G
NONHDLC SERPL-MCNC: 108 MG/DL
POTASSIUM SERPL-SCNC: 4.2 MMOL/L
PROT SERPL-MCNC: 7.8 G/DL
SODIUM SERPL-SCNC: 138 MMOL/L
T4 FREE SERPL-MCNC: 1.5 NG/DL
TRIGL SERPL-MCNC: 158 MG/DL
TSH SERPL-ACNC: 0.6 UIU/ML

## 2023-06-13 PROCEDURE — G0447 BEHAVIOR COUNSEL OBESITY 15M: CPT | Mod: 59

## 2023-06-13 PROCEDURE — 99215 OFFICE O/P EST HI 40 MIN: CPT | Mod: 25

## 2023-06-13 PROCEDURE — 99491 CHRNC CARE MGMT PHYS 1ST 30: CPT | Mod: 25

## 2023-06-13 PROCEDURE — 95251 CONT GLUC MNTR ANALYSIS I&R: CPT

## 2023-06-13 RX ORDER — CHLORTHALIDONE 25 MG/1
25 TABLET ORAL
Qty: 90 | Refills: 3 | Status: ACTIVE | COMMUNITY
Start: 2022-10-13 | End: 1900-01-01

## 2023-06-13 RX ORDER — SEMAGLUTIDE 1.34 MG/ML
4 INJECTION, SOLUTION SUBCUTANEOUS
Qty: 3 | Refills: 3 | Status: DISCONTINUED | COMMUNITY
Start: 2022-08-11 | End: 2023-06-13

## 2023-06-13 RX ORDER — INSULIN ASPART 100 [IU]/ML
100 INJECTION, SOLUTION INTRAVENOUS; SUBCUTANEOUS
Qty: 1 | Refills: 1 | Status: DISCONTINUED | COMMUNITY
Start: 2023-05-01 | End: 2023-06-13

## 2023-07-05 ENCOUNTER — APPOINTMENT (OUTPATIENT)
Dept: CARDIOLOGY | Facility: CLINIC | Age: 58
End: 2023-07-05

## 2023-07-14 ENCOUNTER — APPOINTMENT (OUTPATIENT)
Dept: SURGERY | Facility: HOSPITAL | Age: 58
End: 2023-07-14
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
## 2023-07-15 NOTE — PHYSICAL EXAM
[Alert] : alert [Well Nourished] : well nourished [No Acute Distress] : no acute distress [Well Developed] : well developed [Normal Sclera/Conjunctiva] : normal sclera/conjunctiva [EOMI] : extra ocular movement intact [No Proptosis] : no proptosis [Normal Oropharynx] : the oropharynx was normal [No Respiratory Distress] : no respiratory distress [No Accessory Muscle Use] : no accessory muscle use [No Edema] : no peripheral edema [Not Distended] : not distended [Normal Anterior Cervical Nodes] : no anterior cervical lymphadenopathy [Normal Posterior Cervical Nodes] : no posterior cervical lymphadenopathy [No Spinal Tenderness] : no spinal tenderness [Spine Straight] : spine straight [No Stigmata of Cushings Syndrome] : no stigmata of Cushings Syndrome [Normal Gait] : normal gait [No Involuntary Movements] : no involuntary movements were seen [Normal Strength/Tone] : muscle strength and tone were normal [No Rash] : no rash [Normal Reflexes] : deep tendon reflexes were 2+ and symmetric [No Tremors] : no tremors [Oriented x3] : oriented to person, place, and time [Acanthosis Nigricans] : no acanthosis nigricans

## 2023-07-15 NOTE — ADDENDUM
[FreeTextEntry1] : 6/13/23\par Current meds: \par -Levothyroxine 200 mcg daily except extra 1/2 tab on Mondays and Fridays\par -Lantus 15 units qHS\par -Novolog 5 units before meals if over 120\par -Metformin 1000mg BID at breakfast and dinner\par -Ozempic 1 mg weekly\par -Rosuvastatin 10 mg daily\par \par Issues/Concerns:\par -was seen by a nutritionist and was suggested to try Wegovy\par -had steroid injections last month and had to self increase Novolog as needed due to increased blood sugar\par -reports that sugars have been high early in the morning as well as at night

## 2023-07-26 ENCOUNTER — NON-APPOINTMENT (OUTPATIENT)
Age: 58
End: 2023-07-26

## 2023-07-26 ENCOUNTER — RX RENEWAL (OUTPATIENT)
Age: 58
End: 2023-07-26

## 2023-08-01 ENCOUNTER — NON-APPOINTMENT (OUTPATIENT)
Age: 58
End: 2023-08-01

## 2023-08-01 ENCOUNTER — APPOINTMENT (OUTPATIENT)
Dept: CARDIOLOGY | Facility: CLINIC | Age: 58
End: 2023-08-01
Payer: MEDICARE

## 2023-08-01 VITALS
HEIGHT: 70 IN | WEIGHT: 315 LBS | DIASTOLIC BLOOD PRESSURE: 82 MMHG | RESPIRATION RATE: 87 BRPM | SYSTOLIC BLOOD PRESSURE: 136 MMHG | BODY MASS INDEX: 45.1 KG/M2

## 2023-08-01 PROCEDURE — 93000 ELECTROCARDIOGRAM COMPLETE: CPT

## 2023-08-01 PROCEDURE — 99215 OFFICE O/P EST HI 40 MIN: CPT

## 2023-08-01 RX ORDER — SERTRALINE 25 MG/1
25 TABLET, FILM COATED ORAL
Qty: 30 | Refills: 0 | Status: DISCONTINUED | COMMUNITY
Start: 2022-05-25 | End: 2023-08-01

## 2023-08-01 RX ORDER — DUTASTERIDE 0.5 MG/1
0.5 CAPSULE, LIQUID FILLED ORAL
Refills: 0 | Status: ACTIVE | COMMUNITY
Start: 2022-08-04

## 2023-08-01 NOTE — REVIEW OF SYSTEMS
[FreeTextEntry4] : Evaluation for tinnitus and hearing loss.  Now using hearing aids.  Positional vertigo. [FreeTextEntry5] : See HPI [FreeTextEntry6] : Sleep apnea has returned with weight gain. [FreeTextEntry7] : Rare GERD.  History of diverticulosis.  Periodic hemorrhoidal bleeding.  Cholecystitis with ERCP and laparoscopic cholecystectomy [FreeTextEntry8] : Recurrent kidney stones.  Dr. Ronquillo.  Status post lithotripsy.  ED, [FreeTextEntry9] : Bilateral knee pain. Consultation with Dr. Baudilio Haq. [de-identified] : Recovered from right lower extremity cellulitis. [de-identified] : Periodic bilateral sciatica.

## 2023-08-01 NOTE — ASSESSMENT
[FreeTextEntry1] : EKG 8/1/2023.  Atrial fibrillation.  R S waves V1 through V3.  No acute changes. EKG 1/30/2023.  Atrial fibrillation, controlled.  R S waves V1 through V3.  No acute changes. EKG 7/19/2022.  Atrial fibrillation, controlled.  R S waves V1 through V3.  No acute features.  EKG 7/14/2021.  Atrial fibrillation.  Controlled.  R S waves V1 through V3.  No acute changes. EKG from hospital 1/12/2021.  Atrial fibrillation, controlled.  R S waves in lead III and aVF.  No acute changes. EKG 9/1/2020.  Atrial fibrillation.  Controlled.  R S waves V1 through V3.  Due to body habitus.  No acute changes. EKG atrial fibrillation.  Controlled.  Otherwise within normal limits.

## 2023-08-01 NOTE — DISCUSSION/SUMMARY
[FreeTextEntry1] : Brief recommendations and follow-up: (see above for details)  The patient remains with a considerable problem list. He has made a good recovery after cholecystitis and procedures as noted. He is working hard to swim to improve his functional capacity.  I did advise him regarding the importance of exercise and diet. Atrial fibrillation is controlled with diltiazem and continue anticoagulation with dabigatran. Continue CPAP for sleep apnea. Medications reviewed and reconciled. Continue to work with endocrinologist and primary care physician for his diabetes.  He has had some medication adjustments. The note computer issues have interfered with normal documentation of this visit. Today's visit 45 minutes.

## 2023-08-01 NOTE — HISTORY OF PRESENT ILLNESS
[FreeTextEntry1] : This 58 year-old male patient presents for cardiovascular evaluation.  His problem list is as noted above.  Since his last examination approximately 6 months ago the patient has had a number of medical events.  The patient had cholecystitis and was hospitalized at [place].  He underwent initially ERCP followed by laparoscopic cholecystectomy.  There were no cardiac complications.  The patient is also receiving gel shots for his chronic arthritis.  The patient continues with functional limitations that is multifactorial.  I was pleased to hear he is continuing to exercise with swimming regularly.  There are no acute exertional symptoms.  He remains with chronic shortness of breath, again multifactorial particularly his deconditioning, obesity.  No symptoms of chest discomfort or palpitations.  He is going to be seeing the vascular team for some chronic venous abnormalities.  He does have a history of cellulitis as well.

## 2023-08-01 NOTE — CARDIOLOGY SUMMARY
[de-identified] : EKG 7/14/2021.  Atrial fibrillation.  Controlled.  R S waves V1 through V3.  No acute changes. [de-identified] : Stress MIBI 3/10/17. Baseline atrial fibrillation. No ischemia. EF 65%. Gutierrez stage III.  9-1/2 minutes. 11 METs. 85%. \par 2 day stress MIBI 2/26/16. Normal LV function. EF 57%. Fixed inferoseptal and inferolateral defect, attenuation. No ischemia. Normal wall motion.\par \par  [de-identified] : Echo: 11/11/2021 Slightly dilated LV with normal systolic function EF 65% Dilated RA/LA, mild TR PA minimally elevated 40 \par \par Echo 1/15/2015.  A.  F.  Normal LV function.  EF 65%.  LVH.  Normal RV.  Dilated RA/LA.  Normal valves. [de-identified] : CT angiogram 5/6/2022, no pulmonary embolism or acute pathology.\par Chest CT 7/27/2021.  Aneurysmal dilatation of ascending thoracic aorta, 4.2 cm.\par CT angiogram 10/23/18. No obvious pulmonary embolism. Dilated ascending thoracic  aorta, 4.3 cm (3.9 in 2010).\par \par  [de-identified] : Venous duplex 10/24/22 No evidence of DVT in the right lower extremity. Severe venous insufficiency at the level of the right sapheno-femoral junction.   RUDOLPH no evidence of significant peripheral arterial disease.\par \par Lower extremity study 7/13/17. Negative. \par  [de-identified] : PFT 11/10/2015. Mild restrictive impairment, mild reduction in diffusion \par Sleep study 11/15/14. 39 apneas. Apnea index 43.3 per hour.\par

## 2023-08-02 ENCOUNTER — APPOINTMENT (OUTPATIENT)
Dept: VASCULAR SURGERY | Facility: CLINIC | Age: 58
End: 2023-08-02
Payer: MEDICARE

## 2023-08-02 VITALS — DIASTOLIC BLOOD PRESSURE: 83 MMHG | SYSTOLIC BLOOD PRESSURE: 131 MMHG | HEART RATE: 79 BPM

## 2023-08-02 PROCEDURE — 99213 OFFICE O/P EST LOW 20 MIN: CPT

## 2023-08-02 NOTE — HISTORY OF PRESENT ILLNESS
[FreeTextEntry1] : 59 yo male returns in follow up. The patient was seen in the wound care institute in the past for right lower extremity edema. He was diagnosed with venous insufficiency. He was instructed to wear a compression stocking. He reports that he continues to have pain and swelling despite wearing a compression stocking. He presents to discuss treatment options.

## 2023-08-02 NOTE — ASSESSMENT
[FreeTextEntry1] : 59 yo male with right lower extremity edema and venous insufficiency. He continues to have pain and swelling despite wearing a compression stocking.  The patient underwent a venous duplex in October 2022 that demonstrated right GSV reflux. The patient will undergo a repeat US to make sure that there is no DVT and to confirm the presence of SVI. If he continues to have SVI he is an excellent candidate for an RFA.   Patient will continue to wear compression stockings.

## 2023-08-02 NOTE — REVIEW OF SYSTEMS
[Lower Ext Edema] : lower extremity edema [Limb Swelling] : limb swelling [Fever] : no fever [Chills] : no chills [Joint Swelling] : no joint swelling [Leg Claudication] : no intermittent leg claudication [Joint Stiffness] : no joint stiffness [Limb Pain] : no limb pain

## 2023-08-02 NOTE — PHYSICAL EXAM
[Normal Breath Sounds] : Normal breath sounds [Alert] : alert [Oriented to Person] : oriented to person [Oriented to Place] : oriented to place [Oriented to Time] : oriented to time [Ankle Swelling (On Exam)] : present [Ankle Swelling On The Right] : of the right ankle [JVD] : no jugular venous distention  [Varicose Veins Of Lower Extremities] : bilaterally [] : bilaterally [Ankle Swelling On The Left] : moderate [de-identified] : Awake and Alert. [de-identified] : No ulcerations or skin breakdown [de-identified] : No gross motor or sensory deficits. [de-identified] : Appropriate affect.

## 2023-08-14 ENCOUNTER — RX RENEWAL (OUTPATIENT)
Age: 58
End: 2023-08-14

## 2023-08-24 ENCOUNTER — APPOINTMENT (OUTPATIENT)
Dept: SURGERY | Facility: CLINIC | Age: 58
End: 2023-08-24

## 2023-08-28 ENCOUNTER — APPOINTMENT (OUTPATIENT)
Dept: VASCULAR SURGERY | Facility: CLINIC | Age: 58
End: 2023-08-28
Payer: MEDICARE

## 2023-08-28 PROCEDURE — 93970 EXTREMITY STUDY: CPT

## 2023-08-30 ENCOUNTER — APPOINTMENT (OUTPATIENT)
Dept: VASCULAR SURGERY | Facility: CLINIC | Age: 58
End: 2023-08-30
Payer: MEDICARE

## 2023-08-30 PROCEDURE — 99212 OFFICE O/P EST SF 10 MIN: CPT | Mod: 95

## 2023-08-30 PROCEDURE — 99202 OFFICE O/P NEW SF 15 MIN: CPT | Mod: 95

## 2023-08-31 NOTE — PHYSICAL EXAM
[Alert] : alert [Oriented to Person] : oriented to person [Oriented to Place] : oriented to place [Oriented to Time] : oriented to time [de-identified] : Awake and Alert [de-identified] : Appropriate

## 2023-08-31 NOTE — HISTORY OF PRESENT ILLNESS
[FreeTextEntry1] : 59 yo male returns in follow up. The patient was seen in the wound care institute in the past for right lower extremity edema. He was diagnosed with venous insufficiency. He was instructed to wear a compression stocking. He reports that he continues to have pain and swelling despite wearing a compression stocking. He presents to discuss treatment options.  [Home] : at home, [unfilled] , at the time of the visit. [Medical Office: ([facility])___] : at the medical office located in  [Verbal consent obtained from patient] : the patient, [unfilled] [de-identified] : 59 yo male with symptomatic edema of the right leg. He has a history of venous insufficiency. He reports continued pain and swelling despite wearing a compression stocking. He underwent a venous duplex and presents to discuss the results and additional treatment options.

## 2023-08-31 NOTE — REVIEW OF SYSTEMS
[Fever] : no fever [Chills] : no chills [Leg Claudication] : no intermittent leg claudication [Lower Ext Edema] : lower extremity edema [Joint Swelling] : no joint swelling [Joint Stiffness] : no joint stiffness [Limb Pain] : no limb pain [Limb Swelling] : limb swelling

## 2023-08-31 NOTE — ASSESSMENT
[FreeTextEntry1] : 57 yo male with right lower extremity edema and venous insufficiency. He continues to have pain and swelling despite wearing a compression stocking.  The patient underwent a venous duplex   that demonstrated right GSV reflux with a markedly enlarged GSV. He is an excellent candidate for an ablation of the vein. The risks and benefits of the procedure were discussed with the patient who agrees to proceed.    Patient will continue to wear compression stocking until the time of the procedure.

## 2023-09-01 ENCOUNTER — APPOINTMENT (OUTPATIENT)
Dept: GASTROENTEROLOGY | Facility: CLINIC | Age: 58
End: 2023-09-01
Payer: MEDICARE

## 2023-09-01 VITALS
HEIGHT: 70 IN | SYSTOLIC BLOOD PRESSURE: 124 MMHG | DIASTOLIC BLOOD PRESSURE: 74 MMHG | HEART RATE: 73 BPM | WEIGHT: 315 LBS | BODY MASS INDEX: 45.1 KG/M2 | OXYGEN SATURATION: 98 %

## 2023-09-01 PROCEDURE — 99214 OFFICE O/P EST MOD 30 MIN: CPT

## 2023-09-01 NOTE — ASSESSMENT
[FreeTextEntry1] : Nausea / Duarrhea:  suspect secondary to newly started Ozempic.  Patient  will discuss with prescribing MD and  follow up with me as needed  Pertinent available records reviewed

## 2023-09-01 NOTE — PHYSICAL EXAM
[FreeTextEntry1] : deferred [Alert] : alert [Sclera] : the sclera and conjunctiva were normal [Hearing Threshold Finger Rub Not [place]] : hearing was normal [Normal Appearance] : the appearance of the neck was normal [Heart Rate And Rhythm] : heart rate was normal and rhythm regular [Bowel Sounds] : normal bowel sounds [No CVA Tenderness] : no CVA  tenderness [Abnormal Walk] : normal gait [Skin Lesions] : no skin lesions [No Focal Deficits] : no focal deficits [Oriented To Time, Place, And Person] : oriented to person, place, and time

## 2023-09-01 NOTE — HISTORY OF PRESENT ILLNESS
[de-identified] : Presents with a c/o nausea / diarrhea  alternating with occasional constipation since starting Ozempic.  Last evaluated 10/2022 for a 2  week c/o nausea (constant).  Started Ozempic @ 1 month ago. Denies melena / diarrhea / emesis.   Presented   with persistent hemorrhoidal discomfort / reflux at July 2022 follow up. Anusol and weight loss along with dietary and  lifestyle  modification recommended  Capsule  study  ( Dr. Perales - 5/2022) for anemia was unremarkable except for gastritis.  Labs ( 4/2022) HGB = 11.4 / normal MCV  At evaluation  ( 10/2021 ) c/o  rectal pressure and  burning after meals x  several months.  Additionally c/o loose / semi-solid  stools.  Saw Dr. Levine ~ 4 months ago for hemorrhoids.  Additional had  burning with urination ( Referred to Yg )    Stool studies were notable  for elevated lactoferrin and  calprotectin. Gave  trial of Azithromycin for elevated lactoferrin / calprotectin.   Evaluated 12/2020 for GERD and H/H slightly decreased at 13/40. Admitted to frequent Heartburn. EGD / colonoscopy ( 4/2021) revealed gastritis / hemorrhoids / diverticulosis / rectal polyps ( hyperplastic)   -EGD in 7/2018 for dysphagia was unremarkable.   Evaluated 2/2018  for follow up for rectal bleeding / itching / burning and pain (on Pradaxa).  Had purposefully lost weight with Medifast / exercise.   Evaluated  in 2/2015 for self limited abdominal pain / nausea. Work up was unrevealing ( including CAT scan revealing unchanged 1.1 cm liver lesion, nephrolithiasis / renal cyst) . Sonogram reportedly showed GB sludge. Referred to Dr. Loera who apparently did not believe the GB was the problem.  - EGD 12/2014 ( reflux) revealed a hyperplastic gastric polyp.  -Colonoscopy for diarrhea on 10/2013 ( non specific inflam changes ./ adenoma / c. diff negative ) and on 5/29/12 ( diverticulosis and hemorrhoids / Random biopsies were normal /Stool studies were notable for C.diff which was treated with Flagyl) .  The patient also has a h/o diverticulitis, last attack in 2008. CAT scan in 2012 for abnormal liver enzymes ( AST =79) and a liver lesion seen on a CAt scan in 2008 revealed fatty liver and an unchanged liver lesion ( c/w 2008) consistent with a cyst or hamartoma.   -Colonoscopy 12/2010 revealed diverticulosis, hemorrhoids and a benign polyp.  -EGD in 2010 / 2005 revealed gastritis and a small HH. -Colonoscopy in 2008 revealed hemorrhoids, diverticulosis and a benign polyp. Similar findings were noted at a colonoscopy in 2006 / 2004.

## 2023-09-06 ENCOUNTER — RX RENEWAL (OUTPATIENT)
Age: 58
End: 2023-09-06

## 2023-09-06 RX ORDER — LOSARTAN POTASSIUM 50 MG/1
50 TABLET, FILM COATED ORAL TWICE DAILY
Qty: 180 | Refills: 3 | Status: ACTIVE | COMMUNITY
Start: 2019-08-30 | End: 1900-01-01

## 2023-10-04 ENCOUNTER — APPOINTMENT (OUTPATIENT)
Dept: VASCULAR SURGERY | Facility: CLINIC | Age: 58
End: 2023-10-04
Payer: MEDICARE

## 2023-10-04 VITALS — HEART RATE: 84 BPM | SYSTOLIC BLOOD PRESSURE: 136 MMHG | DIASTOLIC BLOOD PRESSURE: 78 MMHG

## 2023-10-04 VITALS — HEART RATE: 97 BPM | DIASTOLIC BLOOD PRESSURE: 91 MMHG | SYSTOLIC BLOOD PRESSURE: 152 MMHG

## 2023-10-04 PROCEDURE — 36482Z ENDOVEN THER CHEM ADHES 1ST: CUSTOM | Mod: RT

## 2023-10-06 ENCOUNTER — LABORATORY RESULT (OUTPATIENT)
Age: 58
End: 2023-10-06

## 2023-10-06 ENCOUNTER — RX RENEWAL (OUTPATIENT)
Age: 58
End: 2023-10-06

## 2023-10-11 ENCOUNTER — APPOINTMENT (OUTPATIENT)
Dept: NEPHROLOGY | Facility: CLINIC | Age: 58
End: 2023-10-11

## 2023-10-11 ENCOUNTER — APPOINTMENT (OUTPATIENT)
Dept: VASCULAR SURGERY | Facility: CLINIC | Age: 58
End: 2023-10-11
Payer: MEDICARE

## 2023-10-11 DIAGNOSIS — I87.2 VENOUS INSUFFICIENCY (CHRONIC) (PERIPHERAL): ICD-10-CM

## 2023-10-11 PROCEDURE — 93971 EXTREMITY STUDY: CPT | Mod: RT

## 2023-10-11 PROCEDURE — 99213 OFFICE O/P EST LOW 20 MIN: CPT

## 2023-10-13 ENCOUNTER — APPOINTMENT (OUTPATIENT)
Dept: ENDOCRINOLOGY | Facility: CLINIC | Age: 58
End: 2023-10-13
Payer: MEDICARE

## 2023-10-13 VITALS
HEART RATE: 71 BPM | WEIGHT: 315 LBS | BODY MASS INDEX: 45.1 KG/M2 | SYSTOLIC BLOOD PRESSURE: 122 MMHG | OXYGEN SATURATION: 99 % | HEIGHT: 70 IN | DIASTOLIC BLOOD PRESSURE: 82 MMHG

## 2023-10-13 LAB
ALBUMIN SERPL ELPH-MCNC: 4.5 G/DL
ALP BLD-CCNC: 98 U/L
ALT SERPL-CCNC: 37 U/L
ANION GAP SERPL CALC-SCNC: 13 MMOL/L
AST SERPL-CCNC: 42 U/L
BILIRUB SERPL-MCNC: 0.3 MG/DL
BUN SERPL-MCNC: 21 MG/DL
CALCIUM SERPL-MCNC: 9.4 MG/DL
CHLORIDE SERPL-SCNC: 96 MMOL/L
CHOLEST SERPL-MCNC: 136 MG/DL
CO2 SERPL-SCNC: 29 MMOL/L
CREAT SERPL-MCNC: 1.31 MG/DL
EGFR: 62 ML/MIN/1.73M2
ESTIMATED AVERAGE GLUCOSE: 169 MG/DL
GLUCOSE BLDC GLUCOMTR-MCNC: 193
GLUCOSE SERPL-MCNC: 179 MG/DL
HBA1C MFR BLD HPLC: 7.5 %
HDLC SERPL-MCNC: 39 MG/DL
LDLC SERPL CALC-MCNC: 58 MG/DL
NONHDLC SERPL-MCNC: 97 MG/DL
POTASSIUM SERPL-SCNC: 4.1 MMOL/L
PROT SERPL-MCNC: 7.8 G/DL
SODIUM SERPL-SCNC: 137 MMOL/L
T4 FREE SERPL-MCNC: 1.2 NG/DL
TRIGL SERPL-MCNC: 244 MG/DL
TSH SERPL-ACNC: 2.96 UIU/ML

## 2023-10-13 PROCEDURE — G0444 DEPRESSION SCREEN ANNUAL: CPT | Mod: 59

## 2023-10-13 PROCEDURE — 99215 OFFICE O/P EST HI 40 MIN: CPT | Mod: 25

## 2023-10-13 PROCEDURE — 99491 CHRNC CARE MGMT PHYS 1ST 30: CPT

## 2023-10-13 PROCEDURE — 82962 GLUCOSE BLOOD TEST: CPT

## 2023-10-13 PROCEDURE — G0447 BEHAVIOR COUNSEL OBESITY 15M: CPT | Mod: 59

## 2023-10-13 PROCEDURE — 95251 CONT GLUC MNTR ANALYSIS I&R: CPT

## 2023-11-10 ENCOUNTER — APPOINTMENT (OUTPATIENT)
Dept: NEPHROLOGY | Facility: CLINIC | Age: 58
End: 2023-11-10
Payer: MEDICARE

## 2023-11-10 ENCOUNTER — RESULT REVIEW (OUTPATIENT)
Age: 58
End: 2023-11-10

## 2023-11-10 VITALS
WEIGHT: 315 LBS | OXYGEN SATURATION: 97 % | HEIGHT: 70 IN | SYSTOLIC BLOOD PRESSURE: 100 MMHG | HEART RATE: 85 BPM | BODY MASS INDEX: 45.1 KG/M2 | DIASTOLIC BLOOD PRESSURE: 70 MMHG

## 2023-11-10 PROCEDURE — 99214 OFFICE O/P EST MOD 30 MIN: CPT

## 2023-11-21 ENCOUNTER — APPOINTMENT (OUTPATIENT)
Dept: HEMATOLOGY ONCOLOGY | Facility: CLINIC | Age: 58
End: 2023-11-21

## 2023-12-01 ENCOUNTER — RX RENEWAL (OUTPATIENT)
Age: 58
End: 2023-12-01

## 2023-12-04 ENCOUNTER — APPOINTMENT (OUTPATIENT)
Dept: HEMATOLOGY ONCOLOGY | Facility: CLINIC | Age: 58
End: 2023-12-04
Payer: MEDICARE

## 2023-12-04 ENCOUNTER — RESULT REVIEW (OUTPATIENT)
Age: 58
End: 2023-12-04

## 2023-12-04 VITALS
RESPIRATION RATE: 16 BRPM | SYSTOLIC BLOOD PRESSURE: 104 MMHG | BODY MASS INDEX: 45.1 KG/M2 | HEIGHT: 70 IN | WEIGHT: 315 LBS | OXYGEN SATURATION: 96 % | TEMPERATURE: 96.9 F | HEART RATE: 82 BPM | DIASTOLIC BLOOD PRESSURE: 74 MMHG

## 2023-12-04 PROCEDURE — 36415 COLL VENOUS BLD VENIPUNCTURE: CPT

## 2023-12-04 PROCEDURE — 99213 OFFICE O/P EST LOW 20 MIN: CPT | Mod: 25

## 2023-12-04 RX ORDER — POTASSIUM CITRATE 10 MEQ/1
10 MEQ TABLET, EXTENDED RELEASE ORAL DAILY
Refills: 0 | Status: DISCONTINUED | COMMUNITY
End: 2023-12-04

## 2023-12-06 ENCOUNTER — APPOINTMENT (OUTPATIENT)
Dept: SURGERY | Facility: CLINIC | Age: 58
End: 2023-12-06
Payer: MEDICARE

## 2023-12-06 VITALS
WEIGHT: 315 LBS | SYSTOLIC BLOOD PRESSURE: 107 MMHG | HEART RATE: 88 BPM | OXYGEN SATURATION: 95 % | HEIGHT: 70 IN | DIASTOLIC BLOOD PRESSURE: 73 MMHG | BODY MASS INDEX: 45.1 KG/M2 | RESPIRATION RATE: 16 BRPM

## 2023-12-06 DIAGNOSIS — K21.9 GASTRO-ESOPHAGEAL REFLUX DISEASE W/OUT ESOPHAGITIS: ICD-10-CM

## 2023-12-06 PROCEDURE — 99214 OFFICE O/P EST MOD 30 MIN: CPT

## 2023-12-08 ENCOUNTER — APPOINTMENT (OUTPATIENT)
Dept: HEART AND VASCULAR | Facility: CLINIC | Age: 58
End: 2023-12-08
Payer: MEDICARE

## 2023-12-08 ENCOUNTER — NON-APPOINTMENT (OUTPATIENT)
Age: 58
End: 2023-12-08

## 2023-12-08 VITALS
BODY MASS INDEX: 45.1 KG/M2 | HEIGHT: 70 IN | SYSTOLIC BLOOD PRESSURE: 106 MMHG | HEART RATE: 106 BPM | WEIGHT: 315 LBS | OXYGEN SATURATION: 96 % | DIASTOLIC BLOOD PRESSURE: 64 MMHG

## 2023-12-08 PROCEDURE — 99204 OFFICE O/P NEW MOD 45 MIN: CPT | Mod: 25

## 2023-12-08 PROCEDURE — 93000 ELECTROCARDIOGRAM COMPLETE: CPT | Mod: NC

## 2023-12-13 ENCOUNTER — APPOINTMENT (OUTPATIENT)
Dept: BARIATRICS | Facility: CLINIC | Age: 58
End: 2023-12-13

## 2023-12-13 ENCOUNTER — APPOINTMENT (OUTPATIENT)
Dept: BARIATRICS | Facility: CLINIC | Age: 58
End: 2023-12-13
Payer: MEDICARE

## 2023-12-13 PROCEDURE — 97802 MEDICAL NUTRITION INDIV IN: CPT | Mod: 95

## 2023-12-15 ENCOUNTER — APPOINTMENT (OUTPATIENT)
Dept: BARIATRICS | Facility: CLINIC | Age: 58
End: 2023-12-15
Payer: MEDICARE

## 2023-12-15 PROCEDURE — 90792 PSYCH DIAG EVAL W/MED SRVCS: CPT
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
## 2023-12-15 NOTE — REASON FOR VISIT
[Initial Consult] : an initial consult for [Morbid Obesity (BMI>40)] : morbid obesity (bmi>40) [Referring By:  ___] : ~Ravinder Ln~ was referred for psychological evaluation by Dr. PINEDA [Attempted Weight Loss] : attempted weight loss [Commitment to Modified Lifestyle] : commitment to a modified lifestyle pre and post surgery [Difficulties with Diet Compliance] : difficulties with diet compliance  [Expectations of Outcome] : expectations of outcome [Motivation for Selecting Surgery] : motivation for selecting surgery [Strength of Social Support System] : strength of social support system [Patient Understands Data May be Shared] : patient understands that the information discussed during the evaluation would be shared with referring provider and possibly with ~his/her~ insurance provider

## 2023-12-15 NOTE — DISCUSSION/SUMMARY
[FreeTextEntry1] : It is recommended that the patient demonstrates an ability to be compliant with a diet plan as recommended by the nutritionist and is able to demonstrate utilization of healthy coping strategies in times of stress without relying on excessive carbs consumption prior to proceeding with surgery.  RTC for follow up in March 2024.  Plan discussed with the patient, all questions answered. [de-identified] : E66.9 - obesity, unspecified F54 - Psychological and behavioral factors associated with disorders or diseases classified elsewhere

## 2023-12-15 NOTE — HISTORY OF PRESENT ILLNESS
[Poor Choices] : poor choices [Large Portions] : large portions [Emotional Eating] : emotional eating [Decrease Activity] : decrease activity [de-identified] : CC: "I am overweight and I have a ton of health issues. I eat too much"  Patient is a 57 yo M with multiple medical problems, no PPH who presents for psychiatric assessment in preparation for bariatric surgery.   Weight and Diet history Patient reports that he was always "big" and describes himself as muscular when he was a teenager/young adult. After he quit smoking at 24, his weight started to increase. It went up from low 200s to almost 370 lb, however he was able to decrease it.  His highest weight was 370lb and his current weight is 340 lb. Patient reports trying MediFast 4 years ago - lost 100 lb however he regained the weight. He has also tried Weight Watchers.  Patient reports difficulty staying focused on a diet plan and tends to quit after a few week.  He reports that he becomes frustrated "3-4 weeks in" due to lack of immediate results and quits the plan, leading to gaining the weight back. He has recently started swimming 2-3 times a week for 30 minutes.   Eating and activity habits For breakfast he has a bagel or muffin. For lunch he eats chicken salad, meatball sandwich (hero), chicken salad or a roll. For dinner, he eats hamburgers, pasta, pizza, chicken cutlets. He describes his portions as "huge" - pasta with meatballs and sausage. He has not tried to cut down his portion size. He enjoys eating fruit. Patient snacks on protein bars, bag of chips, a candy bar. On a typical day, he has 3 meals and 2-3 snacks. He reports grazing behaviors. Patient reports that he eats more when he is stressed - 2 or 3 slices of pizza instead of 1-2, a big bowl of pasta. He attributes stress to his chronic pain and medical conditions and reports engaging in eating more due to stress several times per week. He denies binge eating. He denies night eating syndrome. Denies prior dx of eating disorder. Denies h/o compensatory behaviors.  He and his wife both do grocery shopping and cooking,   Changes made thus far and changes needed for success The patient was aware of some of the permanent lifestyle changes needed to achieve maximum benefit from surgery. He is aware that he will need to eat smaller portions. Patient acknowledges that he may be frustrated by the changes to be made after the surgery, which has previously led him to quit diet plans. He admits that he does not know how he will manage stress without relying on food. Patient has started to exercise more recently.  Knowledge and Motivation for surgery The patient is aware of the nature of bariatric surgery, understands potential risks/benefits and related complications. The patient is aware of the changes that must be made in the eating and lifestyle habits, both short- and long- term. His target weight is 180-200 lb. He would like to be more physically active and improve his overall health. Patient is concerned about surgical complications and has been thinking about changing his diet first.  Support system His wife is supportive of his desire to seek surgery. He is retired x 12 years after being a . She will be his supporting him after the procedure. Patient is considering going back to work after he feels better. He denies any recent or upcoming psychosocial stressors.   Labs and vitals reviewed.

## 2023-12-15 NOTE — SOCIAL HISTORY
[Lives with Spouse] : lives with spouse [Retired From Work] : retired from work [] :  [# Of Children ___] : has [unfilled] children [College] : College [None] : none [FreeTextEntry2] : worked as a  for 25 years [FreeTextEntry4] : dropped out after 3 semesters

## 2023-12-20 ENCOUNTER — APPOINTMENT (OUTPATIENT)
Dept: CARDIOLOGY | Facility: CLINIC | Age: 58
End: 2023-12-20

## 2023-12-28 ENCOUNTER — APPOINTMENT (OUTPATIENT)
Dept: CARDIOLOGY | Facility: CLINIC | Age: 58
End: 2023-12-28
Payer: MEDICARE

## 2023-12-28 PROCEDURE — 93306 TTE W/DOPPLER COMPLETE: CPT

## 2024-01-03 ENCOUNTER — APPOINTMENT (OUTPATIENT)
Dept: SURGERY | Facility: CLINIC | Age: 59
End: 2024-01-03
Payer: MEDICARE

## 2024-01-03 ENCOUNTER — APPOINTMENT (OUTPATIENT)
Dept: BARIATRICS | Facility: CLINIC | Age: 59
End: 2024-01-03
Payer: MEDICARE

## 2024-01-03 VITALS
HEIGHT: 70 IN | RESPIRATION RATE: 16 BRPM | HEART RATE: 69 BPM | OXYGEN SATURATION: 97 % | BODY MASS INDEX: 45.1 KG/M2 | WEIGHT: 315 LBS | DIASTOLIC BLOOD PRESSURE: 73 MMHG | SYSTOLIC BLOOD PRESSURE: 123 MMHG

## 2024-01-03 PROCEDURE — 97803 MED NUTRITION INDIV SUBSEQ: CPT | Mod: 93

## 2024-01-03 PROCEDURE — 99214 OFFICE O/P EST MOD 30 MIN: CPT

## 2024-01-03 NOTE — HISTORY OF PRESENT ILLNESS
[de-identified] : 58 yr old male presents for follow up in preparation for bariatric surgery. Patient has numerous comorbidities and is primarily motivated by the need to have hernia surgery. He underwent nutritionala nd psych evaluations and  cardiac testing. Psychiatrist is concerned about his overall commitment and patient is not fully convinced that he wants to pursue bariatric surgery

## 2024-01-03 NOTE — PLAN
[FreeTextEntry1] : Follow up with nutrition and Psych. Follow up with endocrine to increase dose of muonjaro Follow up with me in 1 month

## 2024-01-04 ENCOUNTER — RESULT REVIEW (OUTPATIENT)
Age: 59
End: 2024-01-04

## 2024-01-12 ENCOUNTER — RESULT REVIEW (OUTPATIENT)
Age: 59
End: 2024-01-12

## 2024-01-16 ENCOUNTER — RX RENEWAL (OUTPATIENT)
Age: 59
End: 2024-01-16

## 2024-01-18 NOTE — HISTORY OF PRESENT ILLNESS
[FreeTextEntry1] : 59 YO M with DM,HTN, Aortic dilatation (4.2cm, 2021), obesity who is has been following with cardiology.  Patient is pending for bariatric/hernia surgery. He has chronic SOB likely related to obesity. Unclear if he has more than 5 METS of activity.  No chest pain/palpitations/syncope

## 2024-01-18 NOTE — DISCUSSION/SUMMARY
[FreeTextEntry1] : 57 YO M with DM,HTN, Aortic dilatation (4.2cm, 2021), obesity who is has been following with cardiology.  He has Q waves in the EKG and has had fixed defects on his prior stress test. Activity is not clear. (> 5 METS)  Patient is asymptomatic. TTE SPECT   Aortic aneurysm  Will do a CTA of the chest to follow.

## 2024-01-18 NOTE — ADDENDUM
[FreeTextEntry1] : _ve SPECT. unremarkable TTE with no sig valvular disease.  Low risk for an intermediate risk procedure.  No further cardiac testing needed

## 2024-01-23 ENCOUNTER — APPOINTMENT (OUTPATIENT)
Dept: PULMONOLOGY | Facility: CLINIC | Age: 59
End: 2024-01-23

## 2024-01-27 RX ORDER — FLASH GLUCOSE SENSOR
KIT MISCELLANEOUS
Qty: 6 | Refills: 3 | Status: ACTIVE | COMMUNITY
Start: 2019-10-14 | End: 1900-01-01

## 2024-01-29 ENCOUNTER — APPOINTMENT (OUTPATIENT)
Dept: ENDOCRINOLOGY | Facility: CLINIC | Age: 59
End: 2024-01-29
Payer: COMMERCIAL

## 2024-01-29 VITALS
SYSTOLIC BLOOD PRESSURE: 118 MMHG | BODY MASS INDEX: 45.1 KG/M2 | OXYGEN SATURATION: 97 % | WEIGHT: 315 LBS | HEART RATE: 106 BPM | DIASTOLIC BLOOD PRESSURE: 62 MMHG | HEIGHT: 70 IN

## 2024-01-29 LAB
ALBUMIN SERPL ELPH-MCNC: 4.8 G/DL
ALP BLD-CCNC: 101 U/L
ALT SERPL-CCNC: 33 U/L
ANION GAP SERPL CALC-SCNC: 14 MMOL/L
AST SERPL-CCNC: 35 U/L
BILIRUB SERPL-MCNC: 0.5 MG/DL
BUN SERPL-MCNC: 16 MG/DL
CALCIUM SERPL-MCNC: 9.6 MG/DL
CHLORIDE SERPL-SCNC: 97 MMOL/L
CHOLEST SERPL-MCNC: 143 MG/DL
CO2 SERPL-SCNC: 29 MMOL/L
CREAT SERPL-MCNC: 1.16 MG/DL
CREAT SPEC-SCNC: 188 MG/DL
EGFR: 72 ML/MIN/1.73M2
ESTIMATED AVERAGE GLUCOSE: 154 MG/DL
GLUCOSE BLDC GLUCOMTR-MCNC: 232
GLUCOSE SERPL-MCNC: 197 MG/DL
HBA1C MFR BLD HPLC: 7 %
HDLC SERPL-MCNC: 40 MG/DL
LDLC SERPL CALC-MCNC: 75 MG/DL
MAGNESIUM SERPL-MCNC: 1.6 MG/DL
MICROALBUMIN 24H UR DL<=1MG/L-MCNC: 3.4 MG/DL
MICROALBUMIN/CREAT 24H UR-RTO: 18 MG/G
NONHDLC SERPL-MCNC: 103 MG/DL
POTASSIUM SERPL-SCNC: 3.7 MMOL/L
PROT SERPL-MCNC: 7.4 G/DL
SODIUM SERPL-SCNC: 139 MMOL/L
T4 FREE SERPL-MCNC: 1.5 NG/DL
TRIGL SERPL-MCNC: 165 MG/DL
TSH SERPL-ACNC: 0.72 UIU/ML

## 2024-01-29 PROCEDURE — 82962 GLUCOSE BLOOD TEST: CPT

## 2024-01-29 PROCEDURE — 99215 OFFICE O/P EST HI 40 MIN: CPT | Mod: 25

## 2024-01-29 PROCEDURE — 95251 CONT GLUC MNTR ANALYSIS I&R: CPT

## 2024-01-29 PROCEDURE — G0447 BEHAVIOR COUNSEL OBESITY 15M: CPT | Mod: 59

## 2024-01-29 PROCEDURE — G2211 COMPLEX E/M VISIT ADD ON: CPT
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
## 2024-01-29 NOTE — REVIEW OF SYSTEMS
[Recent Weight Loss (___ Lbs)] : recent weight loss: [unfilled] lbs [Joint Pain] : joint pain [Negative] : Heme/Lymph [FreeTextEntry9] : Arthritic pain in knees. + Sciatica  [FreeTextEntry1] : Gastrointestinal: nausea . passing kidney stones. \par  Genitourinary:. hemathuria. \par  Musculoskeletal: joint pain and back pain. \par  Neurological: headaches. \par  Psychiatric: insomnia. \par  Endocrine: polydipsia. \par  Constitutional, Eyes, ENT, Cardiovascular, Respiratory, Gastrointestinal, Genitourinary, Musculoskeletal, Integumentary, Neurological, Psychiatric, Endocrine and Heme/Lymph are otherwise negative.

## 2024-01-29 NOTE — ASSESSMENT
[Little interest or pleasure doing things] : 1) Little interest or pleasure doing things [Feeling down, depressed, or hopeless] : 2) Feeling down, depressed, or hopeless [0] : 2) Feeling down, depressed, or hopeless: Not at all (0) [PHQ-2 Negative - No further assessment needed] : PHQ-2 Negative - No further assessment needed [FreeTextEntry1] : 5min spent on depression screening [[id number]] : 0

## 2024-01-29 NOTE — PHYSICAL EXAM
[Alert] : alert [Well Nourished] : well nourished [No Acute Distress] : no acute distress [Well Developed] : well developed [Normal Sclera/Conjunctiva] : normal sclera/conjunctiva [EOMI] : extra ocular movement intact [No Proptosis] : no proptosis [Normal Oropharynx] : the oropharynx was normal [No Respiratory Distress] : no respiratory distress [No Accessory Muscle Use] : no accessory muscle use [No Edema] : no peripheral edema [Not Distended] : not distended [Normal Anterior Cervical Nodes] : no anterior cervical lymphadenopathy [No Spinal Tenderness] : no spinal tenderness [Spine Straight] : spine straight [No Stigmata of Cushings Syndrome] : no stigmata of Cushings Syndrome [Normal Gait] : normal gait [No Involuntary Movements] : no involuntary movements were seen [Normal Strength/Tone] : muscle strength and tone were normal [No Rash] : no rash [Normal Reflexes] : deep tendon reflexes were 2+ and symmetric [No Tremors] : no tremors [Oriented x3] : oriented to person, place, and time [Acanthosis Nigricans] : no acanthosis nigricans

## 2024-01-30 ENCOUNTER — RX RENEWAL (OUTPATIENT)
Age: 59
End: 2024-01-30

## 2024-01-31 ENCOUNTER — NON-APPOINTMENT (OUTPATIENT)
Age: 59
End: 2024-01-31

## 2024-02-05 ENCOUNTER — APPOINTMENT (OUTPATIENT)
Dept: CARDIOLOGY | Facility: CLINIC | Age: 59
End: 2024-02-05
Payer: MEDICARE

## 2024-02-05 ENCOUNTER — NON-APPOINTMENT (OUTPATIENT)
Age: 59
End: 2024-02-05

## 2024-02-05 VITALS
OXYGEN SATURATION: 98 % | BODY MASS INDEX: 45.1 KG/M2 | SYSTOLIC BLOOD PRESSURE: 106 MMHG | HEART RATE: 65 BPM | DIASTOLIC BLOOD PRESSURE: 68 MMHG | HEIGHT: 70 IN | WEIGHT: 315 LBS

## 2024-02-05 PROCEDURE — 93000 ELECTROCARDIOGRAM COMPLETE: CPT

## 2024-02-05 PROCEDURE — 99215 OFFICE O/P EST HI 40 MIN: CPT | Mod: 25

## 2024-02-05 NOTE — DISCUSSION/SUMMARY
[FreeTextEntry1] : Brief recommendations and follow-up: (see above for details)  As noted the patient continues with a considerable cardiovascular medical problem list. Details are noted above regarding potential bariatric surgery with further recommendations to follow. I am taking the liberty of discontinuing his CT angiography in view of his normal pharmacologic stress testing and echocardiography. Additional multiple medical problems as noted above. Atrial fibrillation controlled. Blood pressure and lipid satisfactory and improving. Next routine cardiology visit 6 months, adjust as necessary .  Next full visit will be with Dr Powell. Today's visit 45 minutes.

## 2024-02-05 NOTE — PHYSICAL EXAM
[de-identified] : Morbidly obese. [de-identified] : 1+ chronic bilateral edema. [de-identified] : Some chronic skin changes

## 2024-02-05 NOTE — REASON FOR VISIT
[FreeTextEntry1] : Mr. ELMER MOSQUERA has the following problem list.  Chronic atrial fibrillation Dyslipidemia Hypertension History of pulmonary embolism Type 2 diabetes Aortic dilatation Obesity History of sleep apnea  He has additional medical problems as noted. This includes kidney stones.  His primary care physician is Dr. Metcalf

## 2024-02-05 NOTE — ADDENDUM
[FreeTextEntry1] : This report was generated using voice recognition software. Please excuse obvious typographical errors and contact this office for any questions. Any preliminary copy of this note given to the patient at the time of this visit has not been proofread or edited. This note is part of a shared electronic record used by our providers and may contain information generated by others in addition to those entries made during today's visit.\par

## 2024-02-05 NOTE — HISTORY OF PRESENT ILLNESS
[FreeTextEntry1] : This 58 year-old male patient presents for cardiovascular evaluation.  His problem list is as noted above.  Since his last full examination 6 months ago the patient has had a number of medical interactions.  He has had follow-up with his endocrinologist as well as bariatric surgeon.  He saw another cardiologist in our group as a potential preoperative cardiology assessment prior to bariatric surgery.  Workup included an echocardiogram that demonstrated normal LV function and relatively minor valve findings.  He also had a pharmacologic stress test that showed no evidence of myocardial ischemia.  The patient's functional capacity is limited predominantly by orthopedic limitation.  He has bilateral knee discomfort.  He has difficulty walking up a flight of stairs.  He does note chronic, stable shortness of breath.  No symptoms of chest discomfort.  Patient evidently had a trial of Victoza but he had side effects and this was discontinued.

## 2024-02-05 NOTE — CARDIOLOGY SUMMARY
[de-identified] : EKG 7/14/2021.  Atrial fibrillation.  Controlled.  R S waves V1 through V3.  No acute changes. [de-identified] : Pharmacologic nuclear stress 1/12/2024.  No ischemia or infarction.  EF 64%. Stress MIBI 3/10/17. Baseline atrial fibrillation. No ischemia. EF 65%. Gutierrez stage III.  9-1/2 minutes. 11 METs. 85%.  2 day stress MIBI 2/26/16. Normal LV function. EF 57%. Fixed inferoseptal and inferolateral defect, attenuation. No ischemia. Normal wall motion.   [de-identified] : Echo 12/28/2023.  D.  K.  Normal LV function.  EF 60%.  Dilated LA.  Calcified mitral annulus.  Trace MR.  Dilated ascending aorta, 4.2 cm.  Trace TR.  Borderline PA, 40 mmHg. Echo: 11/11/2021 Slightly dilated LV with normal systolic function EF 65% Dilated RA/LA, mild TR PA minimally elevated 40  Echo 1/15/2015.  A.  F.  Normal LV function.  EF 65%.  LVH.  Normal RV.  Dilated RA/LA.  Normal valves. [de-identified] : CT angiogram 5/6/2022, no pulmonary embolism or acute pathology.\par  Chest CT 7/27/2021.  Aneurysmal dilatation of ascending thoracic aorta, 4.2 cm.\par  CT angiogram 10/23/18. No obvious pulmonary embolism. Dilated ascending thoracic  aorta, 4.3 cm (3.9 in 2010).\par  \par   [de-identified] : Venous duplex 10/24/22 No evidence of DVT in the right lower extremity. Severe venous insufficiency at the level of the right sapheno-femoral junction.   RUDOLPH no evidence of significant peripheral arterial disease.\par  \par  Lower extremity study 7/13/17. Negative. \par   [de-identified] : PFT 11/10/2015. Mild restrictive impairment, mild reduction in diffusion \par  Sleep study 11/15/14. 39 apneas. Apnea index 43.3 per hour.\par

## 2024-02-05 NOTE — REVIEW OF SYSTEMS
[FreeTextEntry4] : Evaluation for tinnitus and hearing loss.  Now using hearing aids.  Positional vertigo. [FreeTextEntry5] : See HPI [FreeTextEntry6] : Sleep apnea has returned with weight gain. [FreeTextEntry7] : Rare GERD.  History of diverticulosis.  Periodic hemorrhoidal bleeding.  Cholecystitis with ERCP and laparoscopic cholecystectomy [FreeTextEntry8] : Recurrent kidney stones.  Dr. Ronquillo.  Status post lithotripsy.  ED, [FreeTextEntry9] : Bilateral knee pain. Consultation with Dr. Baudilio Haq. [de-identified] : Recovered from right lower extremity cellulitis. [de-identified] : Periodic bilateral sciatica.

## 2024-02-05 NOTE — ASSESSMENT
[FreeTextEntry1] : EKG 2/5/2024.  Atrial fibrillation.  R S waves V1 through V3.  No acute features low voltage limb leads.  (Note: Previous tracings from today had lead reversal). EKG 8/1/2023.  Atrial fibrillation.  R S waves V1 through V3.  No acute changes. EKG 1/30/2023.  Atrial fibrillation, controlled.  R S waves V1 through V3.  No acute changes. EKG 7/19/2022.  Atrial fibrillation, controlled.  R S waves V1 through V3.  No acute features.  EKG 7/14/2021.  Atrial fibrillation.  Controlled.  R S waves V1 through V3.  No acute changes. EKG from hospital 1/12/2021.  Atrial fibrillation, controlled.  R S waves in lead III and aVF.  No acute changes. EKG 9/1/2020.  Atrial fibrillation.  Controlled.  R S waves V1 through V3.  Due to body habitus.  No acute changes. EKG atrial fibrillation.  Controlled.  Otherwise within normal limits.

## 2024-02-08 ENCOUNTER — RX RENEWAL (OUTPATIENT)
Age: 59
End: 2024-02-08

## 2024-02-12 ENCOUNTER — APPOINTMENT (OUTPATIENT)
Dept: BARIATRICS | Facility: CLINIC | Age: 59
End: 2024-02-12

## 2024-03-05 ENCOUNTER — APPOINTMENT (OUTPATIENT)
Dept: GASTROENTEROLOGY | Facility: HOSPITAL | Age: 59
End: 2024-03-05

## 2024-03-08 ENCOUNTER — APPOINTMENT (OUTPATIENT)
Dept: BARIATRICS | Facility: CLINIC | Age: 59
End: 2024-03-08

## 2024-03-20 ENCOUNTER — APPOINTMENT (OUTPATIENT)
Dept: SURGERY | Facility: CLINIC | Age: 59
End: 2024-03-20
Payer: MEDICARE

## 2024-03-20 VITALS
OXYGEN SATURATION: 98 % | WEIGHT: 315 LBS | BODY MASS INDEX: 45.1 KG/M2 | RESPIRATION RATE: 16 BRPM | HEIGHT: 70 IN | HEART RATE: 90 BPM | DIASTOLIC BLOOD PRESSURE: 71 MMHG | SYSTOLIC BLOOD PRESSURE: 128 MMHG

## 2024-03-20 PROCEDURE — 99213 OFFICE O/P EST LOW 20 MIN: CPT | Mod: 25

## 2024-03-20 PROCEDURE — 46600 DIAGNOSTIC ANOSCOPY SPX: CPT

## 2024-03-20 NOTE — PHYSICAL EXAM
[Abdomen Masses] : No abdominal masses [Abdomen Tenderness] : ~T No ~M abdominal tenderness [Nonprolapsing] : a nonprolapsing (grade I) [Fistula] : no fistulas [Normal] : was normal [None] : there was no rectal abscess [JVD] : no jugular venous distention  [Respiratory Effort] : normal respiratory effort [No Rash or Lesion] : No rash or lesion [Calm] : calm [Alert] : alert [de-identified] : No acute distress [de-identified] : Perianal excoriation of the skin.

## 2024-03-20 NOTE — ASSESSMENT
[FreeTextEntry1] : 59-year-old male returns for follow-up for perianal discomfort and hemorrhoids.  Continues to have perianal irritation and excoriation of the skin.  Continue to recommend high-fiber diet.  Can continue to use topical barrier cream such as Desitin or Calmoseptine.  Could consider treatment for hemorrhoids including injections or therapy given his Pradaxa use versus transanal hemorrhoidal dearterialization.  Then she will get a more durable response with THD.  He is also considering bariatric surgery with Dr. Fernández.  Discussed that this may significantly improve several symptoms beyond hemorrhoidal issues with regards to additional comorbidities, joint pain, hemorrhoidal swelling.  Patient will consider his options and call to schedule if he like to proceed with any procedures.

## 2024-03-20 NOTE — PROCEDURE
[FreeTextEntry1] : Anoscopy: Anoscopic exam performed with lighted anoscope.  Moderate internal hemorrhoids without active bleeding.  Some discomfort with exam to the perianal excoriation.  No other anorectal mucosal abnormalities noted.

## 2024-03-21 ENCOUNTER — RX RENEWAL (OUTPATIENT)
Age: 59
End: 2024-03-21

## 2024-03-21 RX ORDER — LABETALOL HYDROCHLORIDE 200 MG/1
200 TABLET, FILM COATED ORAL TWICE DAILY
Qty: 540 | Refills: 3 | Status: ACTIVE | COMMUNITY
Start: 2022-11-23 | End: 1900-01-01

## 2024-03-21 RX ORDER — ROSUVASTATIN CALCIUM 10 MG/1
10 TABLET, FILM COATED ORAL DAILY
Qty: 90 | Refills: 3 | Status: ACTIVE | COMMUNITY
Start: 2020-03-18 | End: 1900-01-01

## 2024-03-25 ENCOUNTER — RESULT REVIEW (OUTPATIENT)
Age: 59
End: 2024-03-25

## 2024-03-25 ENCOUNTER — APPOINTMENT (OUTPATIENT)
Dept: HEMATOLOGY ONCOLOGY | Facility: CLINIC | Age: 59
End: 2024-03-25

## 2024-03-25 VITALS
HEART RATE: 83 BPM | OXYGEN SATURATION: 98 % | DIASTOLIC BLOOD PRESSURE: 70 MMHG | RESPIRATION RATE: 16 BRPM | WEIGHT: 315 LBS | HEIGHT: 70 IN | SYSTOLIC BLOOD PRESSURE: 117 MMHG | TEMPERATURE: 96.5 F | BODY MASS INDEX: 45.1 KG/M2

## 2024-03-28 ENCOUNTER — APPOINTMENT (OUTPATIENT)
Dept: SURGERY | Facility: CLINIC | Age: 59
End: 2024-03-28

## 2024-04-03 ENCOUNTER — RX RENEWAL (OUTPATIENT)
Age: 59
End: 2024-04-03

## 2024-04-03 RX ORDER — ALLOPURINOL 300 MG/1
300 TABLET ORAL DAILY
Qty: 90 | Refills: 1 | Status: ACTIVE | COMMUNITY
Start: 2017-06-23 | End: 1900-01-01

## 2024-04-11 ENCOUNTER — LABORATORY RESULT (OUTPATIENT)
Age: 59
End: 2024-04-11

## 2024-04-15 ENCOUNTER — APPOINTMENT (OUTPATIENT)
Dept: ENDOCRINOLOGY | Facility: CLINIC | Age: 59
End: 2024-04-15
Payer: MEDICARE

## 2024-04-15 VITALS
BODY MASS INDEX: 48.35 KG/M2 | HEART RATE: 97 BPM | OXYGEN SATURATION: 97 % | WEIGHT: 315 LBS | SYSTOLIC BLOOD PRESSURE: 116 MMHG | DIASTOLIC BLOOD PRESSURE: 84 MMHG

## 2024-04-15 LAB
ALBUMIN SERPL ELPH-MCNC: 4.1 G/DL
ALP BLD-CCNC: 83 U/L
ALT SERPL-CCNC: 33 U/L
ANION GAP SERPL CALC-SCNC: 13 MMOL/L
AST SERPL-CCNC: 27 U/L
BILIRUB SERPL-MCNC: 0.4 MG/DL
BUN SERPL-MCNC: 17 MG/DL
CALCIUM SERPL-MCNC: 9.8 MG/DL
CHLORIDE SERPL-SCNC: 99 MMOL/L
CHOLEST SERPL-MCNC: 142 MG/DL
CO2 SERPL-SCNC: 29 MMOL/L
CREAT SERPL-MCNC: 1.26 MG/DL
EGFR: 65 ML/MIN/1.73M2
ESTIMATED AVERAGE GLUCOSE: 154 MG/DL
GLUCOSE BLDC GLUCOMTR-MCNC: 165
GLUCOSE SERPL-MCNC: 161 MG/DL
HBA1C MFR BLD HPLC: 7 %
HDLC SERPL-MCNC: 44 MG/DL
LDLC SERPL CALC-MCNC: 73 MG/DL
NONHDLC SERPL-MCNC: 98 MG/DL
POTASSIUM SERPL-SCNC: 4.3 MMOL/L
PROT SERPL-MCNC: 7 G/DL
SODIUM SERPL-SCNC: 141 MMOL/L
T4 FREE SERPL-MCNC: 1.7 NG/DL
TRIGL SERPL-MCNC: 144 MG/DL
TSH SERPL-ACNC: 0.12 UIU/ML

## 2024-04-15 PROCEDURE — G2211 COMPLEX E/M VISIT ADD ON: CPT

## 2024-04-15 PROCEDURE — 99215 OFFICE O/P EST HI 40 MIN: CPT | Mod: 25

## 2024-04-15 PROCEDURE — 82962 GLUCOSE BLOOD TEST: CPT

## 2024-04-15 PROCEDURE — 95251 CONT GLUC MNTR ANALYSIS I&R: CPT

## 2024-04-15 RX ORDER — INSULIN ASPART INJECTION 100 [IU]/ML
100 INJECTION, SOLUTION SUBCUTANEOUS
Qty: 2 | Refills: 3 | Status: ACTIVE | COMMUNITY
Start: 2024-01-29 | End: 1900-01-01

## 2024-04-15 RX ORDER — INSULIN DEGLUDEC INJECTION 100 U/ML
100 INJECTION, SOLUTION SUBCUTANEOUS
Qty: 1 | Refills: 3 | Status: ACTIVE | COMMUNITY
Start: 2024-04-15 | End: 1900-01-01

## 2024-04-15 RX ORDER — LEVOTHYROXINE SODIUM 0.2 MG/1
200 TABLET ORAL
Qty: 120 | Refills: 2 | Status: ACTIVE | COMMUNITY
Start: 2017-07-02

## 2024-04-15 RX ORDER — TIRZEPATIDE 12.5 MG/.5ML
12.5 INJECTION, SOLUTION SUBCUTANEOUS
Qty: 3 | Refills: 3 | Status: ACTIVE | COMMUNITY
Start: 2023-06-13 | End: 1900-01-01

## 2024-04-15 NOTE — END OF VISIT
[FreeTextEntry3] :  I, Bipin Flores, am scribing for and in the presence of Dr. Ava Boyle in the following sections: HISTORY OF PRESENT ILLNESS; REVIEW OF SYSTEMS; PHYSICAL EXAM; ASSESSMENT/ PLAN. I, Ava Boyle, personally performed the services described in the documentation, reviewed the documentation recorded by the scribe in my presence, and it accurately and completely records my words and actions. 4/15/2024.
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
## 2024-04-15 NOTE — PHYSICAL EXAM
[Alert] : alert [Well Nourished] : well nourished [Obese] : obese [No Acute Distress] : no acute distress [Well Developed] : well developed [Normal Voice/Communication] : normal voice communication [Normal Sclera/Conjunctiva] : normal sclera/conjunctiva [EOMI] : extra ocular movement intact [No Proptosis] : no proptosis [Normal Oropharynx] : the oropharynx was normal [Thyroid Not Enlarged] : the thyroid was not enlarged [No Thyroid Nodules] : no palpable thyroid nodules [No Respiratory Distress] : no respiratory distress [No Accessory Muscle Use] : no accessory muscle use [Clear to Auscultation] : lungs were clear to auscultation bilaterally [Normal S1, S2] : normal S1 and S2 [Normal Rate] : heart rate was normal [Regular Rhythm] : with a regular rhythm [No Edema] : no peripheral edema [Normal Bowel Sounds] : normal bowel sounds [Not Tender] : non-tender [Not Distended] : not distended [Soft] : abdomen soft [Normal Anterior Cervical Nodes] : no anterior cervical lymphadenopathy [Normal Posterior Cervical Nodes] : no posterior cervical lymphadenopathy [No Spinal Tenderness] : no spinal tenderness [Spine Straight] : spine straight [No Stigmata of Cushings Syndrome] : no stigmata of Cushings Syndrome [Normal Gait] : normal gait [Normal Strength/Tone] : muscle strength and tone were normal [No Rash] : no rash [Normal Reflexes] : deep tendon reflexes were 2+ and symmetric [No Tremors] : no tremors [Oriented x3] : oriented to person, place, and time [Acanthosis Nigricans] : no acanthosis nigricans

## 2024-04-24 ENCOUNTER — RX RENEWAL (OUTPATIENT)
Age: 59
End: 2024-04-24

## 2024-04-24 RX ORDER — DILTIAZEM HYDROCHLORIDE 240 MG/1
240 CAPSULE, EXTENDED RELEASE ORAL
Qty: 90 | Refills: 3 | Status: ACTIVE | COMMUNITY
Start: 2020-03-18 | End: 1900-01-01

## 2024-05-01 ENCOUNTER — APPOINTMENT (OUTPATIENT)
Dept: CARDIOLOGY | Facility: CLINIC | Age: 59
End: 2024-05-01
Payer: MEDICARE

## 2024-05-01 ENCOUNTER — NON-APPOINTMENT (OUTPATIENT)
Age: 59
End: 2024-05-01

## 2024-05-01 VITALS
HEIGHT: 70 IN | RESPIRATION RATE: 18 BRPM | DIASTOLIC BLOOD PRESSURE: 86 MMHG | SYSTOLIC BLOOD PRESSURE: 118 MMHG | HEART RATE: 74 BPM | TEMPERATURE: 97.2 F | WEIGHT: 315 LBS | BODY MASS INDEX: 45.1 KG/M2 | OXYGEN SATURATION: 99 %

## 2024-05-01 DIAGNOSIS — Z87.898 PERSONAL HISTORY OF OTHER SPECIFIED CONDITIONS: ICD-10-CM

## 2024-05-01 DIAGNOSIS — N20.0 CALCULUS OF KIDNEY: ICD-10-CM

## 2024-05-01 DIAGNOSIS — G89.4 CHRONIC PAIN SYNDROME: ICD-10-CM

## 2024-05-01 DIAGNOSIS — K62.89 OTHER SPECIFIED DISEASES OF ANUS AND RECTUM: ICD-10-CM

## 2024-05-01 DIAGNOSIS — E83.42 HYPOMAGNESEMIA: ICD-10-CM

## 2024-05-01 DIAGNOSIS — M06.4 INFLAMMATORY POLYARTHROPATHY: ICD-10-CM

## 2024-05-01 DIAGNOSIS — E03.9 HYPOTHYROIDISM, UNSPECIFIED: ICD-10-CM

## 2024-05-01 DIAGNOSIS — I77.810 THORACIC AORTIC ECTASIA: ICD-10-CM

## 2024-05-01 DIAGNOSIS — M10.9 GOUT, UNSPECIFIED: ICD-10-CM

## 2024-05-01 DIAGNOSIS — G47.33 OBSTRUCTIVE SLEEP APNEA (ADULT) (PEDIATRIC): ICD-10-CM

## 2024-05-01 DIAGNOSIS — E83.39 OTHER DISORDERS OF PHOSPHORUS METABOLISM: ICD-10-CM

## 2024-05-01 DIAGNOSIS — M48.061 SPINAL STENOSIS, LUMBAR REGION WITHOUT NEUROGENIC CLAUDICATION: ICD-10-CM

## 2024-05-01 DIAGNOSIS — E78.5 HYPERLIPIDEMIA, UNSPECIFIED: ICD-10-CM

## 2024-05-01 DIAGNOSIS — E66.01 MORBID (SEVERE) OBESITY DUE TO EXCESS CALORIES: ICD-10-CM

## 2024-05-01 DIAGNOSIS — M54.16 RADICULOPATHY, LUMBAR REGION: ICD-10-CM

## 2024-05-01 DIAGNOSIS — I48.20 CHRONIC ATRIAL FIBRILLATION, UNSP: ICD-10-CM

## 2024-05-01 DIAGNOSIS — N18.1 TYPE 2 DIABETES MELLITUS WITH DIABETIC CHRONIC KIDNEY DISEASE: ICD-10-CM

## 2024-05-01 DIAGNOSIS — E11.22 TYPE 2 DIABETES MELLITUS WITH DIABETIC CHRONIC KIDNEY DISEASE: ICD-10-CM

## 2024-05-01 DIAGNOSIS — K76.0 FATTY (CHANGE OF) LIVER, NOT ELSEWHERE CLASSIFIED: ICD-10-CM

## 2024-05-01 DIAGNOSIS — K64.9 UNSPECIFIED HEMORRHOIDS: ICD-10-CM

## 2024-05-01 DIAGNOSIS — D64.9 ANEMIA, UNSPECIFIED: ICD-10-CM

## 2024-05-01 DIAGNOSIS — M79.18 MYALGIA, OTHER SITE: ICD-10-CM

## 2024-05-01 DIAGNOSIS — I10 ESSENTIAL (PRIMARY) HYPERTENSION: ICD-10-CM

## 2024-05-01 DIAGNOSIS — E04.1 NONTOXIC SINGLE THYROID NODULE: ICD-10-CM

## 2024-05-01 DIAGNOSIS — K21.9 GASTRO-ESOPHAGEAL REFLUX DISEASE W/OUT ESOPHAGITIS: ICD-10-CM

## 2024-05-01 DIAGNOSIS — D47.2 MONOCLONAL GAMMOPATHY: ICD-10-CM

## 2024-05-01 DIAGNOSIS — M19.019 PRIMARY OSTEOARTHRITIS, UNSPECIFIED SHOULDER: ICD-10-CM

## 2024-05-01 DIAGNOSIS — R06.02 SHORTNESS OF BREATH: ICD-10-CM

## 2024-05-01 DIAGNOSIS — R60.0 LOCALIZED EDEMA: ICD-10-CM

## 2024-05-01 DIAGNOSIS — M79.2 NEURALGIA AND NEURITIS, UNSPECIFIED: ICD-10-CM

## 2024-05-01 PROCEDURE — 99215 OFFICE O/P EST HI 40 MIN: CPT

## 2024-05-01 PROCEDURE — 93000 ELECTROCARDIOGRAM COMPLETE: CPT

## 2024-05-01 PROCEDURE — G2212 PROLONG OUTPT/OFFICE VIS: CPT

## 2024-05-01 NOTE — CARDIOLOGY SUMMARY
[de-identified] : Pharmacologic nuclear stress 1/12/2024.  No ischemia or infarction.  EF 64%. Stress MIBI 3/10/17. Baseline atrial fibrillation. No ischemia. EF 65%. Gutierrez stage III.  9-1/2 minutes. 11 METs. 85%.  2 day stress MIBI 2/26/16. Normal LV function. EF 57%. Fixed inferoseptal and inferolateral defect, attenuation. No ischemia. Normal wall motion.   [de-identified] : 5/1/24.  Atrial fibrillation.  Controlled.  R S waves V1 through V3.  No acute changes. [de-identified] : Echo 12/28/2023.  D.  K.  Normal LV function.  EF 60%.  Dilated LA.  Calcified mitral annulus.  Trace MR.  Dilated ascending aorta, 4.2 cm.  Trace TR.  Borderline PA, 40 mmHg.  [de-identified] : CT angiogram 5/6/2022, no pulmonary embolism or acute pathology. Chest CT 7/27/2021.  Aneurysmal dilatation of ascending thoracic aorta, 4.2 cm.  [de-identified] : Venous duplex 10/24/22 No evidence of DVT in the right lower extremity. Severe venous insufficiency at the level of the right sapheno-femoral junction.   RUDOLPH no evidence of significant peripheral arterial disease.\par  \par  Lower extremity study 7/13/17. Negative. \par   [de-identified] : PFT 11/10/2015. Mild restrictive impairment, mild reduction in diffusion \par  Sleep study 11/15/14. 39 apneas. Apnea index 43.3 per hour.\par

## 2024-05-01 NOTE — PHYSICAL EXAM
[Well Developed] : well developed [Well Nourished] : well nourished [No Acute Distress] : no acute distress [Obese] : obese [Normal Conjunctiva] : normal conjunctiva [Normal Venous Pressure] : normal venous pressure [No Carotid Bruit] : no carotid bruit [Normal S1, S2] : normal S1, S2 [No Murmur] : no murmur [No Rub] : no rub [No Gallop] : no gallop [Clear Lung Fields] : clear lung fields [Good Air Entry] : good air entry [No Respiratory Distress] : no respiratory distress  [Soft] : abdomen soft [Non Tender] : non-tender [No Masses/organomegaly] : no masses/organomegaly [Normal Bowel Sounds] : normal bowel sounds [Normal Gait] : normal gait [No Cyanosis] : no cyanosis [No Clubbing] : no clubbing [No Varicosities] : no varicosities [No Rash] : no rash [Moves all extremities] : moves all extremities [No Focal Deficits] : no focal deficits [Normal Speech] : normal speech [Alert and Oriented] : alert and oriented [Normal memory] : normal memory [de-identified] : Morbidly obese. [de-identified] : 1+ chronic bilateral edema. [de-identified] : Some chronic skin changes

## 2024-05-01 NOTE — HISTORY OF PRESENT ILLNESS
[FreeTextEntry1] : Mr. Blackwood is a new patient to me today previously followed by Dr. Tate. He saw Dr. Tate most recently in February was considered stable however and returns for an expedited visit because of increasing edema and shortness of breath. This is exertional but also when he rolls over in bed, no PND or orthopnea.  He does have pedal edema. He denies chest pain, palpitations or syncope. He swims and does the elliptical a few days a week.

## 2024-05-01 NOTE — REVIEW OF SYSTEMS
[Negative] : Heme/Lymph [FreeTextEntry4] : Evaluation for tinnitus and hearing loss.  Now using hearing aids.  Positional vertigo. [FreeTextEntry5] : See HPI [FreeTextEntry6] : Sleep apnea has returned with weight gain. [FreeTextEntry7] : Rare GERD.  History of diverticulosis.  Periodic hemorrhoidal bleeding.  Cholecystitis with ERCP and laparoscopic cholecystectomy [FreeTextEntry8] : Recurrent kidney stones.  Dr. Ronquillo.  Status post lithotripsy.  ED, [FreeTextEntry9] : Bilateral knee pain. Consultation with Dr. Baudilio Haq. [de-identified] : Recovered from right lower extremity cellulitis. [de-identified] : Periodic bilateral sciatica.

## 2024-05-02 RX ORDER — BLOOD-GLUCOSE METER
W/DEVICE EACH MISCELLANEOUS
Qty: 1 | Refills: 0 | Status: ACTIVE | COMMUNITY
Start: 2024-05-02 | End: 1900-01-01

## 2024-05-02 RX ORDER — ALCOHOL ANTISEPTIC PADS
PADS, MEDICATED (EA) TOPICAL
Qty: 1 | Refills: 3 | Status: ACTIVE | COMMUNITY
Start: 2024-01-27 | End: 1900-01-01

## 2024-05-02 RX ORDER — INSULIN GLARGINE 100 [IU]/ML
100 INJECTION, SOLUTION SUBCUTANEOUS
Qty: 1 | Refills: 3 | Status: DISCONTINUED | COMMUNITY
Start: 2023-02-14 | End: 2024-05-02

## 2024-05-02 RX ORDER — BLOOD SUGAR DIAGNOSTIC
STRIP MISCELLANEOUS
Qty: 1 | Refills: 3 | Status: ACTIVE | COMMUNITY
Start: 2024-01-27 | End: 1900-01-01

## 2024-05-14 RX ORDER — METOPROLOL TARTRATE 50 MG/1
50 TABLET, FILM COATED ORAL
Qty: 2 | Refills: 0 | Status: ACTIVE | COMMUNITY
Start: 2024-05-14 | End: 1900-01-01

## 2024-05-15 ENCOUNTER — APPOINTMENT (OUTPATIENT)
Dept: ENDOCRINOLOGY | Facility: CLINIC | Age: 59
End: 2024-05-15
Payer: MEDICARE

## 2024-05-15 DIAGNOSIS — E11.9 TYPE 2 DIABETES MELLITUS W/OUT COMPLICATIONS: ICD-10-CM

## 2024-05-15 DIAGNOSIS — Z79.4 TYPE 2 DIABETES MELLITUS W/OUT COMPLICATIONS: ICD-10-CM

## 2024-05-15 PROCEDURE — 95251 CONT GLUC MNTR ANALYSIS I&R: CPT

## 2024-05-20 PROBLEM — E11.9 TYPE 2 DIABETES MELLITUS TREATED WITH INSULIN: Status: ACTIVE | Noted: 2019-12-26

## 2024-05-24 ENCOUNTER — APPOINTMENT (OUTPATIENT)
Dept: PULMONOLOGY | Facility: CLINIC | Age: 59
End: 2024-05-24

## 2024-05-31 ENCOUNTER — RESULT REVIEW (OUTPATIENT)
Age: 59
End: 2024-05-31

## 2024-06-06 ENCOUNTER — RX RENEWAL (OUTPATIENT)
Age: 59
End: 2024-06-06

## 2024-06-06 RX ORDER — PEN NEEDLE, DIABETIC 29 G X1/2"
29G X 12.7MM NEEDLE, DISPOSABLE MISCELLANEOUS
Qty: 4 | Refills: 3 | Status: ACTIVE | COMMUNITY
Start: 2019-06-03 | End: 1900-01-01

## 2024-06-14 ENCOUNTER — RX RENEWAL (OUTPATIENT)
Age: 59
End: 2024-06-14

## 2024-06-14 ENCOUNTER — NON-APPOINTMENT (OUTPATIENT)
Age: 59
End: 2024-06-14

## 2024-06-14 RX ORDER — ENOXAPARIN SODIUM 40 MG/.4ML
40 INJECTION, SOLUTION SUBCUTANEOUS
Qty: 2 | Refills: 1 | Status: ACTIVE | COMMUNITY
Start: 2024-06-14 | End: 1900-01-01

## 2024-06-14 RX ORDER — METFORMIN ER 500 MG 500 MG/1
500 TABLET ORAL
Qty: 360 | Refills: 1 | Status: ACTIVE | COMMUNITY
Start: 2019-04-29 | End: 1900-01-01

## 2024-06-14 RX ORDER — INSULIN ASPART 100 [IU]/ML
100 INJECTION, SOLUTION INTRAVENOUS; SUBCUTANEOUS
Qty: 3 | Refills: 3 | Status: ACTIVE | COMMUNITY
Start: 2023-01-03 | End: 1900-01-01

## 2024-06-16 ENCOUNTER — RESULT REVIEW (OUTPATIENT)
Age: 59
End: 2024-06-16

## 2024-06-17 ENCOUNTER — APPOINTMENT (OUTPATIENT)
Dept: GASTROENTEROLOGY | Facility: HOSPITAL | Age: 59
End: 2024-06-17

## 2024-07-08 ENCOUNTER — APPOINTMENT (OUTPATIENT)
Dept: CARDIOLOGY | Facility: CLINIC | Age: 59
End: 2024-07-08
Payer: MEDICARE

## 2024-07-08 PROCEDURE — 93306 TTE W/DOPPLER COMPLETE: CPT

## 2024-07-12 ENCOUNTER — LABORATORY RESULT (OUTPATIENT)
Age: 59
End: 2024-07-12

## 2024-07-15 ENCOUNTER — APPOINTMENT (OUTPATIENT)
Dept: ENDOCRINOLOGY | Facility: CLINIC | Age: 59
End: 2024-07-15

## 2024-07-15 VITALS
OXYGEN SATURATION: 98 % | BODY MASS INDEX: 45.1 KG/M2 | HEIGHT: 70 IN | DIASTOLIC BLOOD PRESSURE: 70 MMHG | WEIGHT: 315 LBS | HEART RATE: 68 BPM | SYSTOLIC BLOOD PRESSURE: 112 MMHG

## 2024-07-15 DIAGNOSIS — E66.01 MORBID (SEVERE) OBESITY DUE TO EXCESS CALORIES: ICD-10-CM

## 2024-07-15 DIAGNOSIS — D64.9 ANEMIA, UNSPECIFIED: ICD-10-CM

## 2024-07-15 DIAGNOSIS — E03.9 HYPOTHYROIDISM, UNSPECIFIED: ICD-10-CM

## 2024-07-15 DIAGNOSIS — E83.42 HYPOMAGNESEMIA: ICD-10-CM

## 2024-07-15 DIAGNOSIS — E11.9 TYPE 2 DIABETES MELLITUS W/OUT COMPLICATIONS: ICD-10-CM

## 2024-07-15 DIAGNOSIS — E83.39 OTHER DISORDERS OF PHOSPHORUS METABOLISM: ICD-10-CM

## 2024-07-15 DIAGNOSIS — Z79.4 TYPE 2 DIABETES MELLITUS W/OUT COMPLICATIONS: ICD-10-CM

## 2024-07-15 PROCEDURE — G0447 BEHAVIOR COUNSEL OBESITY 15M: CPT

## 2024-07-15 PROCEDURE — 95251 CONT GLUC MNTR ANALYSIS I&R: CPT

## 2024-07-15 PROCEDURE — 99215 OFFICE O/P EST HI 40 MIN: CPT

## 2024-07-15 PROCEDURE — G2211 COMPLEX E/M VISIT ADD ON: CPT

## 2024-07-15 RX ORDER — TAMSULOSIN HYDROCHLORIDE 0.4 MG/1
0.4 CAPSULE ORAL
Refills: 0 | Status: ACTIVE | COMMUNITY

## 2024-07-17 ENCOUNTER — APPOINTMENT (OUTPATIENT)
Dept: ENDOCRINOLOGY | Facility: CLINIC | Age: 59
End: 2024-07-17
Payer: MEDICARE

## 2024-07-17 DIAGNOSIS — R93.1 ABNORMAL FINDINGS ON DIAGNOSTIC IMAGING OF HEART AND CORONARY CIRCULATION: ICD-10-CM

## 2024-07-17 PROCEDURE — 97802 MEDICAL NUTRITION INDIV IN: CPT

## 2024-07-22 ENCOUNTER — APPOINTMENT (OUTPATIENT)
Dept: CARDIOLOGY | Facility: CLINIC | Age: 59
End: 2024-07-22
Payer: MEDICARE

## 2024-07-22 PROCEDURE — ZZZZZ: CPT | Mod: NC

## 2024-07-22 PROCEDURE — 93351 STRESS TTE COMPLETE: CPT

## 2024-07-27 ENCOUNTER — NON-APPOINTMENT (OUTPATIENT)
Age: 59
End: 2024-07-27

## 2024-07-27 DIAGNOSIS — Z87.19 PERSONAL HISTORY OF OTHER DISEASES OF THE DIGESTIVE SYSTEM: ICD-10-CM

## 2024-07-27 DIAGNOSIS — K21.9 GASTRO-ESOPHAGEAL REFLUX DISEASE W/OUT ESOPHAGITIS: ICD-10-CM

## 2024-07-27 DIAGNOSIS — K83.8 OTHER SPECIFIED DISEASES OF BILIARY TRACT: ICD-10-CM

## 2024-07-27 DIAGNOSIS — Z86.010 PERSONAL HISTORY OF COLONIC POLYPS: ICD-10-CM

## 2024-07-27 DIAGNOSIS — K62.89 OTHER SPECIFIED DISEASES OF ANUS AND RECTUM: ICD-10-CM

## 2024-07-27 DIAGNOSIS — K76.0 FATTY (CHANGE OF) LIVER, NOT ELSEWHERE CLASSIFIED: ICD-10-CM

## 2024-07-27 DIAGNOSIS — N40.0 BENIGN PROSTATIC HYPERPLASIA WITHOUT LOWER URINARY TRACT SYMPMS: ICD-10-CM

## 2024-07-27 NOTE — REVIEW OF SYSTEMS
[Negative] : Heme/Lymph [FreeTextEntry4] : Evaluation for tinnitus and hearing loss.  Now using hearing aids.  Positional vertigo. [FreeTextEntry5] : See HPI [FreeTextEntry6] : Sleep apnea has returned with weight gain. [FreeTextEntry7] : Rare GERD.  History of diverticulosis.  Periodic hemorrhoidal bleeding.  Cholecystitis with ERCP and laparoscopic cholecystectomy [FreeTextEntry8] : Recurrent kidney stones.  Dr. Ronquillo.  Status post lithotripsy.  ED, [FreeTextEntry9] : Bilateral knee pain. Consultation with Dr. Baudilio Haq. [de-identified] : Recovered from right lower extremity cellulitis. [de-identified] : Periodic bilateral sciatica.

## 2024-07-27 NOTE — PHYSICAL EXAM
[Well Developed] : well developed [Well Nourished] : well nourished [No Acute Distress] : no acute distress [Obese] : obese [Normal Conjunctiva] : normal conjunctiva [Normal Venous Pressure] : normal venous pressure [No Carotid Bruit] : no carotid bruit [Normal S1, S2] : normal S1, S2 [No Murmur] : no murmur [No Rub] : no rub [No Gallop] : no gallop [Clear Lung Fields] : clear lung fields [Good Air Entry] : good air entry [No Respiratory Distress] : no respiratory distress  [Soft] : abdomen soft [Non Tender] : non-tender [No Masses/organomegaly] : no masses/organomegaly [Normal Bowel Sounds] : normal bowel sounds [Normal Gait] : normal gait [No Cyanosis] : no cyanosis [No Clubbing] : no clubbing [No Varicosities] : no varicosities [No Rash] : no rash [Moves all extremities] : moves all extremities [No Focal Deficits] : no focal deficits [Normal Speech] : normal speech [Alert and Oriented] : alert and oriented [Normal memory] : normal memory [de-identified] : Morbidly obese. [de-identified] : 1+ chronic bilateral edema. [de-identified] : Some chronic skin changes

## 2024-07-27 NOTE — CARDIOLOGY SUMMARY
[de-identified] : 7/29/24.  Atrial fibrillation.  Controlled.  R S waves V1 through V3.  No acute changes. [de-identified] : s/e  7/22/24-submaximal 80%mphr 5:29   [de-identified] : 7/8/24- ef 55-60% lae trace mr/tr, aorta 3.9 sinus, ascending 4.2 cm [de-identified] : cta 5/31/24- 167  73% mes,  <25% LM, mild lad, minimal lcx, mild rca [de-identified] : Venous duplex 10/24/22 No evidence of DVT in the right lower extremity. Severe venous insufficiency at the level of the right sapheno-femoral junction.   RUDOLPH no evidence of significant peripheral arterial disease.\par  \par  Lower extremity study 7/13/17. Negative. \par   [de-identified] : PFT 11/10/2015. Mild restrictive impairment, mild reduction in diffusion \par  Sleep study 11/15/14. 39 apneas. Apnea index 43.3 per hour.\par

## 2024-07-27 NOTE — HISTORY OF PRESENT ILLNESS
[FreeTextEntry1] : Mr. Blackwood was first seen May 2024 , previously under the care of Dr Tate.   There have been no hospitalizations since last visit.  He denies chest pain, palpitations or syncope. He swims and does the elliptical a few days a week.

## 2024-07-29 ENCOUNTER — NON-APPOINTMENT (OUTPATIENT)
Age: 59
End: 2024-07-29

## 2024-07-29 ENCOUNTER — APPOINTMENT (OUTPATIENT)
Dept: CARDIOLOGY | Facility: CLINIC | Age: 59
End: 2024-07-29
Payer: MEDICARE

## 2024-07-29 VITALS
HEART RATE: 78 BPM | DIASTOLIC BLOOD PRESSURE: 70 MMHG | HEIGHT: 70 IN | TEMPERATURE: 97.3 F | BODY MASS INDEX: 45.1 KG/M2 | RESPIRATION RATE: 18 BRPM | WEIGHT: 315 LBS | SYSTOLIC BLOOD PRESSURE: 126 MMHG | OXYGEN SATURATION: 98 %

## 2024-07-29 PROCEDURE — G2211 COMPLEX E/M VISIT ADD ON: CPT

## 2024-07-29 PROCEDURE — 93000 ELECTROCARDIOGRAM COMPLETE: CPT

## 2024-07-29 PROCEDURE — 99215 OFFICE O/P EST HI 40 MIN: CPT

## 2024-07-29 NOTE — ADDENDUM
0230 - Report received from AIMEE Moser RN. Pt resting comfortably. Denies any needs at this time.   0500 - Pt states feeling more discomfort. Pt up to bathroom. Tylenol given for hip and head pain.   0700 - Report given to Deborah GALEAS. POC discussed at bedside.    [FreeTextEntry1] : This report was generated using voice recognition software. Please excuse obvious typographical errors and contact this office for any questions. Any preliminary copy of this note given to the patient at the time of this visit has not been proofread or edited. This note is part of a shared electronic record used by our providers and may contain information generated by others in addition to those entries made during today's visit.\par

## 2024-07-29 NOTE — CARDIOLOGY SUMMARY
[de-identified] : 7/29/24.  Atrial fibrillation.  Controlled.  R S waves V1 through V3.  No acute changes. [de-identified] : s/e  7/22/24-submaximal 80%mphr 5:29   [de-identified] : 7/8/24- ef 55-60% lae trace mr/tr, aorta 3.9 sinus, ascending 4.2 cm [de-identified] : cta 5/31/24- 167  73% mes,  <25% LM, mild lad, minimal lcx, mild rca [de-identified] : Venous duplex 10/24/22 No evidence of DVT in the right lower extremity. Severe venous insufficiency at the level of the right sapheno-femoral junction.   RUODLPH no evidence of significant peripheral arterial disease.\par  \par  Lower extremity study 7/13/17. Negative. \par   [de-identified] : PFT 11/10/2015. Mild restrictive impairment, mild reduction in diffusion \par  Sleep study 11/15/14. 39 apneas. Apnea index 43.3 per hour.\par

## 2024-07-29 NOTE — CARDIOLOGY SUMMARY
[de-identified] : 7/29/24.  Atrial fibrillation.  Controlled.  R S waves V1 through V3.  No acute changes. [de-identified] : s/e  7/22/24-submaximal 80%mphr 5:29   [de-identified] : 7/8/24- ef 55-60% lae trace mr/tr, aorta 3.9 sinus, ascending 4.2 cm [de-identified] : cta 5/31/24- 167  73% mes,  <25% LM, mild lad, minimal lcx, mild rca [de-identified] : Venous duplex 10/24/22 No evidence of DVT in the right lower extremity. Severe venous insufficiency at the level of the right sapheno-femoral junction.   RUDOLPH no evidence of significant peripheral arterial disease.\par  \par  Lower extremity study 7/13/17. Negative. \par   [de-identified] : PFT 11/10/2015. Mild restrictive impairment, mild reduction in diffusion \par  Sleep study 11/15/14. 39 apneas. Apnea index 43.3 per hour.\par

## 2024-07-31 ENCOUNTER — APPOINTMENT (OUTPATIENT)
Dept: PULMONOLOGY | Facility: CLINIC | Age: 59
End: 2024-07-31
Payer: MEDICARE

## 2024-07-31 VITALS
DIASTOLIC BLOOD PRESSURE: 68 MMHG | SYSTOLIC BLOOD PRESSURE: 116 MMHG | HEIGHT: 70 IN | OXYGEN SATURATION: 99 % | WEIGHT: 315 LBS | BODY MASS INDEX: 45.1 KG/M2 | HEART RATE: 111 BPM

## 2024-07-31 DIAGNOSIS — E11.9 TYPE 2 DIABETES MELLITUS W/OUT COMPLICATIONS: ICD-10-CM

## 2024-07-31 DIAGNOSIS — N18.1 TYPE 2 DIABETES MELLITUS WITH DIABETIC CHRONIC KIDNEY DISEASE: ICD-10-CM

## 2024-07-31 DIAGNOSIS — Z79.4 TYPE 2 DIABETES MELLITUS W/OUT COMPLICATIONS: ICD-10-CM

## 2024-07-31 DIAGNOSIS — I48.20 CHRONIC ATRIAL FIBRILLATION, UNSP: ICD-10-CM

## 2024-07-31 DIAGNOSIS — R60.0 LOCALIZED EDEMA: ICD-10-CM

## 2024-07-31 DIAGNOSIS — E66.01 MORBID (SEVERE) OBESITY DUE TO EXCESS CALORIES: ICD-10-CM

## 2024-07-31 DIAGNOSIS — I77.810 THORACIC AORTIC ECTASIA: ICD-10-CM

## 2024-07-31 DIAGNOSIS — G47.33 OBSTRUCTIVE SLEEP APNEA (ADULT) (PEDIATRIC): ICD-10-CM

## 2024-07-31 DIAGNOSIS — E78.5 HYPERLIPIDEMIA, UNSPECIFIED: ICD-10-CM

## 2024-07-31 DIAGNOSIS — I87.2 VENOUS INSUFFICIENCY (CHRONIC) (PERIPHERAL): ICD-10-CM

## 2024-07-31 DIAGNOSIS — R93.1 ABNORMAL FINDINGS ON DIAGNOSTIC IMAGING OF HEART AND CORONARY CIRCULATION: ICD-10-CM

## 2024-07-31 DIAGNOSIS — R06.09 OTHER FORMS OF DYSPNEA: ICD-10-CM

## 2024-07-31 DIAGNOSIS — I10 ESSENTIAL (PRIMARY) HYPERTENSION: ICD-10-CM

## 2024-07-31 DIAGNOSIS — E11.22 TYPE 2 DIABETES MELLITUS WITH DIABETIC CHRONIC KIDNEY DISEASE: ICD-10-CM

## 2024-07-31 DIAGNOSIS — R06.02 SHORTNESS OF BREATH: ICD-10-CM

## 2024-07-31 PROCEDURE — 99214 OFFICE O/P EST MOD 30 MIN: CPT

## 2024-07-31 NOTE — REVIEW OF SYSTEMS
[FreeTextEntry4] : Evaluation for tinnitus and hearing loss.  Now using hearing aids.  Positional vertigo. [FreeTextEntry5] : See HPI [FreeTextEntry6] : Sleep apnea has returned with weight gain. [FreeTextEntry7] : Rare GERD.  History of diverticulosis.  Periodic hemorrhoidal bleeding.  Cholecystitis with ERCP and laparoscopic cholecystectomy [FreeTextEntry8] : Recurrent kidney stones.  Dr. Ronquillo.  Status post lithotripsy.  ED, [FreeTextEntry9] : Bilateral knee pain. Consultation with Dr. Baudilio Haq. [de-identified] : Recovered from right lower extremity cellulitis. [de-identified] : Periodic bilateral sciatica. [SOB on Exertion] : sob on exertion [Arthralgias] : arthralgias [Back Pain] : back pain [Negative] : Endocrine

## 2024-07-31 NOTE — PHYSICAL EXAM
[de-identified] : Morbidly obese. [de-identified] : 1+ chronic bilateral edema. [de-identified] : Some chronic skin changes [No Acute Distress] : no acute distress [III] : Mallampati Class: III [Normal Appearance] : normal appearance [No Neck Mass] : no neck mass [Normal S1, S2] : normal s1, s2 [Irregular rate/rhythm] : irregular rate/rhythm [No Murmurs] : no murmurs [No Resp Distress] : no resp distress [Clear to Auscultation Bilaterally] : clear to auscultation bilaterally [No Abnormalities] : no abnormalities [Benign] : benign [Normal Gait] : normal gait [No Clubbing] : no clubbing [No Cyanosis] : no cyanosis [FROM] : FROM [1+ Pitting] : 1+ pitting [Normal Color/ Pigmentation] : normal color/ pigmentation [No Focal Deficits] : no focal deficits [Oriented x3] : oriented x3 [Normal Affect] : normal affect [TextBox_2] : morbid obesity

## 2024-07-31 NOTE — HISTORY OF PRESENT ILLNESS
[FreeTextEntry1] : Mr. Blackwood was first seen May 2024 , previously under the care of Dr Tate.   There have been no hospitalizations since last visit.  He denies chest pain, palpitations or syncope.He has JAY, denies pnd, orthopnea or pedal edema He swims and does the elliptical a few days a week.     [TextBox_4] : This is a 59-year-old male a former 2 pack/day smoker but quit 1989.  He complains of shortness of breath for the past few months.  There is no cough or wheezing.  He gets short of breath up 1-2 flights of stairs or walking about 2 blocks.  There is no chest pain.  He had a recent CTA of the heart which reveals nonobstructive coronary disease.  He is 5 feet 10 weight 340 pounds and he has gained 100 pounds over the past 2 years.  He swims for exercise but has difficulty walking due to arthritis of his knees and back.  Past medical history significant for obstructive sleep apnea for which he is compliant with CPAP, type 2 diabetes, A-fib, history of pulmonary emboli in 2010.  He also complains of low energy.  Current O2 sat 95 on room air he currently is in atrial fibrillation with a pulse of 100.

## 2024-07-31 NOTE — PHYSICAL EXAM
[de-identified] : Morbidly obese. [de-identified] : 1+ chronic bilateral edema. [de-identified] : Some chronic skin changes [No Acute Distress] : no acute distress [III] : Mallampati Class: III [Normal Appearance] : normal appearance [No Neck Mass] : no neck mass [Normal S1, S2] : normal s1, s2 [Irregular rate/rhythm] : irregular rate/rhythm [No Murmurs] : no murmurs [No Resp Distress] : no resp distress [Clear to Auscultation Bilaterally] : clear to auscultation bilaterally [No Abnormalities] : no abnormalities [Benign] : benign [Normal Gait] : normal gait [No Clubbing] : no clubbing [No Cyanosis] : no cyanosis [FROM] : FROM [1+ Pitting] : 1+ pitting [Normal Color/ Pigmentation] : normal color/ pigmentation [No Focal Deficits] : no focal deficits [Oriented x3] : oriented x3 [Normal Affect] : normal affect [TextBox_2] : morbid obesity

## 2024-07-31 NOTE — ASSESSMENT
[FreeTextEntry1] : Patient with shortness of breath on exertion.  I feel this is likely due to obesity and deconditioning.  Patient will be referred for complete PFT testing.  No medication has been given.  Patient is to follow-up with me after the PFT is performed.  He is advised regarding weight reduction.

## 2024-08-01 ENCOUNTER — APPOINTMENT (OUTPATIENT)
Dept: HEART AND VASCULAR | Facility: CLINIC | Age: 59
End: 2024-08-01

## 2024-08-19 ENCOUNTER — APPOINTMENT (OUTPATIENT)
Dept: PULMONOLOGY | Facility: CLINIC | Age: 59
End: 2024-08-19
Payer: MEDICARE

## 2024-08-19 PROCEDURE — 94729 DIFFUSING CAPACITY: CPT

## 2024-08-19 PROCEDURE — 94060 EVALUATION OF WHEEZING: CPT

## 2024-08-19 PROCEDURE — 94727 GAS DIL/WSHOT DETER LNG VOL: CPT

## 2024-08-21 ENCOUNTER — APPOINTMENT (OUTPATIENT)
Dept: ENDOCRINOLOGY | Facility: CLINIC | Age: 59
End: 2024-08-21

## 2024-08-26 ENCOUNTER — NON-APPOINTMENT (OUTPATIENT)
Age: 59
End: 2024-08-26

## 2024-08-28 ENCOUNTER — APPOINTMENT (OUTPATIENT)
Dept: GASTROENTEROLOGY | Facility: CLINIC | Age: 59
End: 2024-08-28
Payer: MEDICARE

## 2024-08-28 VITALS
WEIGHT: 315 LBS | DIASTOLIC BLOOD PRESSURE: 64 MMHG | OXYGEN SATURATION: 98 % | HEIGHT: 70 IN | SYSTOLIC BLOOD PRESSURE: 98 MMHG | HEART RATE: 78 BPM | BODY MASS INDEX: 45.1 KG/M2

## 2024-08-28 DIAGNOSIS — R10.9 UNSPECIFIED ABDOMINAL PAIN: ICD-10-CM

## 2024-08-28 PROCEDURE — G2211 COMPLEX E/M VISIT ADD ON: CPT

## 2024-08-28 PROCEDURE — 99214 OFFICE O/P EST MOD 30 MIN: CPT

## 2024-08-28 RX ORDER — ESZOPICLONE 3 MG/1
TABLET, COATED ORAL
Refills: 0 | Status: ACTIVE | COMMUNITY

## 2024-08-28 RX ORDER — GABAPENTIN 100 MG/1
100 CAPSULE ORAL
Refills: 0 | Status: ACTIVE | COMMUNITY

## 2024-08-28 NOTE — ASSESSMENT
[FreeTextEntry1] : Abdominal pain / diarrhea alternating with constipation. Suspect Mounjaro. CT scan to exclude other possibilities. Hold Mounjaro for 2 weeks and monitor sxs  Pertinent available records reviewed .

## 2024-08-28 NOTE — HISTORY OF PRESENT ILLNESS
[de-identified] : Presents with a c/ lower abdominal pain / diarrhea alternating with constipation.  Currently on Mounjaro. States that he stopped for 2 weeks and sxs persisted. Denies nausea, vomiting, fever, chills,m melena, hematemesis  - EGD / colonoscopy 7/2024: gastritis / fundal polyps / colon plyps / hemorrhoids / diverticulosis    Evaluated 9/2023 with a c/o nausea / diarrhea  alternating with occasional constipation since starting Ozempic.  Assesmrent was that sxs were due to Ozempic.   Evaluated 10/2022 for a 2  week c/o nausea (constant).  Started Ozempic @ 1 month ago. Denies melena / diarrhea / emesis.   Presented   with persistent hemorrhoidal discomfort / reflux at July 2022 follow up. Anusol and weight loss along with dietary and  lifestyle  modification recommended  Capsule  study  ( Dr. Perales - 5/2022) for anemia was unremarkable except for gastritis.  Labs ( 4/2022) HGB = 11.4 / normal MCV  At evaluation  ( 10/2021 ) c/o  rectal pressure and  burning after meals x  several months.  Additionally c/o loose / semi-solid  stools.  Saw Dr. Levine ~ 4 months ago for hemorrhoids.  Additional had  burning with urination ( Referred to Yg )    Stool studies were notable  for elevated lactoferrin and  calprotectin. Gave  trial of Azithromycin for elevated lactoferrin / calprotectin.   Evaluated 12/2020 for GERD and H/H slightly decreased at 13/40. Admitted to frequent Heartburn. EGD / colonoscopy ( 4/2021) revealed gastritis / hemorrhoids / diverticulosis / rectal polyps ( hyperplastic)   -EGD in 7/2018 for dysphagia was unremarkable.   Evaluated 2/2018  for follow up for rectal bleeding / itching / burning and pain (on Pradaxa).  Had purposefully lost weight with Medifast / exercise.   Evaluated  in 2/2015 for self limited abdominal pain / nausea. Work up was unrevealing ( including CAT scan revealing unchanged 1.1 cm liver lesion, nephrolithiasis / renal cyst) . Sonogram reportedly showed GB sludge. Referred to Dr. Loera who apparently did not believe the GB was the problem.  - EGD 12/2014 ( reflux) revealed a hyperplastic gastric polyp.  -Colonoscopy for diarrhea on 10/2013 ( non specific inflam changes ./ adenoma / c. diff negative ) and on 5/29/12 ( diverticulosis and hemorrhoids / Random biopsies were normal /Stool studies were notable for C.diff which was treated with Flagyl) .  The patient also has a h/o diverticulitis, last attack in 2008. CAT scan in 2012 for abnormal liver enzymes ( AST =79) and a liver lesion seen on a CAt scan in 2008 revealed fatty liver and an unchanged liver lesion ( c/w 2008) consistent with a cyst or hamartoma.   -Colonoscopy 12/2010 revealed diverticulosis, hemorrhoids and a benign polyp.  -EGD in 2010 / 2005 revealed gastritis and a small HH. -Colonoscopy in 2008 revealed hemorrhoids, diverticulosis and a benign polyp. Similar findings were noted at a colonoscopy in 2006 / 2004.

## 2024-08-28 NOTE — PHYSICAL EXAM
[Alert] : alert [Sclera] : the sclera and conjunctiva were normal [Hearing Threshold Finger Rub Not [place]] : hearing was normal [Normal Appearance] : the appearance of the neck was normal [Heart Rate And Rhythm] : heart rate was normal and rhythm regular [Bowel Sounds] : normal bowel sounds [No CVA Tenderness] : no CVA  tenderness [Abnormal Walk] : normal gait [Skin Lesions] : no skin lesions [No Focal Deficits] : no focal deficits [Oriented To Time, Place, And Person] : oriented to person, place, and time [FreeTextEntry1] : deferred

## 2024-09-30 ENCOUNTER — RX RENEWAL (OUTPATIENT)
Age: 59
End: 2024-09-30

## 2024-10-01 ENCOUNTER — RX RENEWAL (OUTPATIENT)
Age: 59
End: 2024-10-01

## 2024-10-03 DIAGNOSIS — Z79.4 TYPE 2 DIABETES MELLITUS W/OUT COMPLICATIONS: ICD-10-CM

## 2024-10-03 DIAGNOSIS — E11.9 TYPE 2 DIABETES MELLITUS W/OUT COMPLICATIONS: ICD-10-CM

## 2024-10-07 RX ORDER — FLASH GLUCOSE SENSOR
KIT MISCELLANEOUS
Qty: 6 | Refills: 3 | Status: ACTIVE | COMMUNITY
Start: 2024-10-03 | End: 1900-01-01

## 2024-10-22 ENCOUNTER — APPOINTMENT (OUTPATIENT)
Dept: ENDOCRINOLOGY | Facility: CLINIC | Age: 59
End: 2024-10-22
Payer: MEDICARE

## 2024-10-22 VITALS
DIASTOLIC BLOOD PRESSURE: 82 MMHG | HEART RATE: 93 BPM | SYSTOLIC BLOOD PRESSURE: 120 MMHG | BODY MASS INDEX: 47.78 KG/M2 | WEIGHT: 315 LBS | OXYGEN SATURATION: 99 %

## 2024-10-22 DIAGNOSIS — E66.01 MORBID (SEVERE) OBESITY DUE TO EXCESS CALORIES: ICD-10-CM

## 2024-10-22 DIAGNOSIS — E11.9 TYPE 2 DIABETES MELLITUS W/OUT COMPLICATIONS: ICD-10-CM

## 2024-10-22 DIAGNOSIS — D64.9 ANEMIA, UNSPECIFIED: ICD-10-CM

## 2024-10-22 DIAGNOSIS — E03.9 HYPOTHYROIDISM, UNSPECIFIED: ICD-10-CM

## 2024-10-22 DIAGNOSIS — E83.39 OTHER DISORDERS OF PHOSPHORUS METABOLISM: ICD-10-CM

## 2024-10-22 DIAGNOSIS — E83.42 HYPOMAGNESEMIA: ICD-10-CM

## 2024-10-22 DIAGNOSIS — Z79.4 TYPE 2 DIABETES MELLITUS W/OUT COMPLICATIONS: ICD-10-CM

## 2024-10-22 LAB — GLUCOSE BLDC GLUCOMTR-MCNC: 121

## 2024-10-22 PROCEDURE — 95251 CONT GLUC MNTR ANALYSIS I&R: CPT

## 2024-10-22 PROCEDURE — 99215 OFFICE O/P EST HI 40 MIN: CPT

## 2024-10-22 PROCEDURE — G2211 COMPLEX E/M VISIT ADD ON: CPT

## 2024-10-24 ENCOUNTER — RESULT REVIEW (OUTPATIENT)
Age: 59
End: 2024-10-24

## 2024-10-29 ENCOUNTER — NON-APPOINTMENT (OUTPATIENT)
Age: 59
End: 2024-10-29

## 2024-11-06 ENCOUNTER — NON-APPOINTMENT (OUTPATIENT)
Age: 59
End: 2024-11-06

## 2024-11-06 ENCOUNTER — APPOINTMENT (OUTPATIENT)
Dept: CARDIOLOGY | Facility: CLINIC | Age: 59
End: 2024-11-06
Payer: MEDICARE

## 2024-11-06 VITALS
HEIGHT: 70 IN | BODY MASS INDEX: 45.1 KG/M2 | SYSTOLIC BLOOD PRESSURE: 126 MMHG | WEIGHT: 315 LBS | DIASTOLIC BLOOD PRESSURE: 82 MMHG

## 2024-11-06 DIAGNOSIS — R93.1 ABNORMAL FINDINGS ON DIAGNOSTIC IMAGING OF HEART AND CORONARY CIRCULATION: ICD-10-CM

## 2024-11-06 DIAGNOSIS — E11.22 TYPE 2 DIABETES MELLITUS WITH DIABETIC CHRONIC KIDNEY DISEASE: ICD-10-CM

## 2024-11-06 DIAGNOSIS — E78.5 HYPERLIPIDEMIA, UNSPECIFIED: ICD-10-CM

## 2024-11-06 DIAGNOSIS — I10 ESSENTIAL (PRIMARY) HYPERTENSION: ICD-10-CM

## 2024-11-06 DIAGNOSIS — E66.01 MORBID (SEVERE) OBESITY DUE TO EXCESS CALORIES: ICD-10-CM

## 2024-11-06 DIAGNOSIS — Z87.19 PERSONAL HISTORY OF OTHER DISEASES OF THE DIGESTIVE SYSTEM: ICD-10-CM

## 2024-11-06 DIAGNOSIS — I48.20 CHRONIC ATRIAL FIBRILLATION, UNSP: ICD-10-CM

## 2024-11-06 DIAGNOSIS — G47.33 OBSTRUCTIVE SLEEP APNEA (ADULT) (PEDIATRIC): ICD-10-CM

## 2024-11-06 DIAGNOSIS — R06.09 OTHER FORMS OF DYSPNEA: ICD-10-CM

## 2024-11-06 DIAGNOSIS — R60.0 LOCALIZED EDEMA: ICD-10-CM

## 2024-11-06 DIAGNOSIS — E11.9 TYPE 2 DIABETES MELLITUS W/OUT COMPLICATIONS: ICD-10-CM

## 2024-11-06 DIAGNOSIS — Z79.4 TYPE 2 DIABETES MELLITUS W/OUT COMPLICATIONS: ICD-10-CM

## 2024-11-06 DIAGNOSIS — Z87.898 PERSONAL HISTORY OF OTHER SPECIFIED CONDITIONS: ICD-10-CM

## 2024-11-06 DIAGNOSIS — N18.1 TYPE 2 DIABETES MELLITUS WITH DIABETIC CHRONIC KIDNEY DISEASE: ICD-10-CM

## 2024-11-06 DIAGNOSIS — I87.2 VENOUS INSUFFICIENCY (CHRONIC) (PERIPHERAL): ICD-10-CM

## 2024-11-06 DIAGNOSIS — I77.810 THORACIC AORTIC ECTASIA: ICD-10-CM

## 2024-11-06 PROCEDURE — 93000 ELECTROCARDIOGRAM COMPLETE: CPT

## 2024-11-06 PROCEDURE — 99215 OFFICE O/P EST HI 40 MIN: CPT

## 2024-12-03 ENCOUNTER — APPOINTMENT (OUTPATIENT)
Dept: SURGERY | Facility: CLINIC | Age: 59
End: 2024-12-03

## 2024-12-03 VITALS
SYSTOLIC BLOOD PRESSURE: 130 MMHG | WEIGHT: 315 LBS | DIASTOLIC BLOOD PRESSURE: 72 MMHG | BODY MASS INDEX: 45.1 KG/M2 | HEART RATE: 67 BPM | OXYGEN SATURATION: 98 % | HEIGHT: 70 IN

## 2024-12-03 PROCEDURE — 99213 OFFICE O/P EST LOW 20 MIN: CPT

## 2024-12-05 ENCOUNTER — RESULT REVIEW (OUTPATIENT)
Age: 59
End: 2024-12-05

## 2024-12-05 ENCOUNTER — APPOINTMENT (OUTPATIENT)
Dept: HEMATOLOGY ONCOLOGY | Facility: CLINIC | Age: 59
End: 2024-12-05
Payer: MEDICARE

## 2024-12-05 VITALS
SYSTOLIC BLOOD PRESSURE: 122 MMHG | HEIGHT: 70 IN | HEART RATE: 96 BPM | OXYGEN SATURATION: 96 % | TEMPERATURE: 96.8 F | BODY MASS INDEX: 45.1 KG/M2 | DIASTOLIC BLOOD PRESSURE: 73 MMHG | RESPIRATION RATE: 18 BRPM | WEIGHT: 315 LBS

## 2024-12-05 DIAGNOSIS — D64.9 ANEMIA, UNSPECIFIED: ICD-10-CM

## 2024-12-05 DIAGNOSIS — Z86.0100 PERSONAL HISTORY OF COLON POLYPS, UNSPECIFIED: ICD-10-CM

## 2024-12-05 DIAGNOSIS — K76.0 FATTY (CHANGE OF) LIVER, NOT ELSEWHERE CLASSIFIED: ICD-10-CM

## 2024-12-05 DIAGNOSIS — E11.22 TYPE 2 DIABETES MELLITUS WITH DIABETIC CHRONIC KIDNEY DISEASE: ICD-10-CM

## 2024-12-05 DIAGNOSIS — N18.1 TYPE 2 DIABETES MELLITUS WITH DIABETIC CHRONIC KIDNEY DISEASE: ICD-10-CM

## 2024-12-05 DIAGNOSIS — M54.16 RADICULOPATHY, LUMBAR REGION: ICD-10-CM

## 2024-12-05 DIAGNOSIS — I10 ESSENTIAL (PRIMARY) HYPERTENSION: ICD-10-CM

## 2024-12-05 PROCEDURE — 36415 COLL VENOUS BLD VENIPUNCTURE: CPT

## 2024-12-05 PROCEDURE — 99213 OFFICE O/P EST LOW 20 MIN: CPT

## 2024-12-30 ENCOUNTER — RX RENEWAL (OUTPATIENT)
Age: 59
End: 2024-12-30

## 2025-01-07 ENCOUNTER — APPOINTMENT (OUTPATIENT)
Dept: NEUROLOGY | Facility: CLINIC | Age: 60
End: 2025-01-07

## 2025-01-08 ENCOUNTER — APPOINTMENT (OUTPATIENT)
Dept: NEPHROLOGY | Facility: CLINIC | Age: 60
End: 2025-01-08
Payer: MEDICARE

## 2025-01-08 VITALS
HEIGHT: 70 IN | BODY MASS INDEX: 45.1 KG/M2 | DIASTOLIC BLOOD PRESSURE: 90 MMHG | HEART RATE: 90 BPM | WEIGHT: 315 LBS | SYSTOLIC BLOOD PRESSURE: 114 MMHG | OXYGEN SATURATION: 97 %

## 2025-01-08 DIAGNOSIS — E83.39 OTHER DISORDERS OF PHOSPHORUS METABOLISM: ICD-10-CM

## 2025-01-08 DIAGNOSIS — N18.1 TYPE 2 DIABETES MELLITUS WITH DIABETIC CHRONIC KIDNEY DISEASE: ICD-10-CM

## 2025-01-08 DIAGNOSIS — E83.42 HYPOMAGNESEMIA: ICD-10-CM

## 2025-01-08 DIAGNOSIS — N20.0 CALCULUS OF KIDNEY: ICD-10-CM

## 2025-01-08 DIAGNOSIS — Z79.4 TYPE 2 DIABETES MELLITUS W/OUT COMPLICATIONS: ICD-10-CM

## 2025-01-08 DIAGNOSIS — E11.22 TYPE 2 DIABETES MELLITUS WITH DIABETIC CHRONIC KIDNEY DISEASE: ICD-10-CM

## 2025-01-08 DIAGNOSIS — I10 ESSENTIAL (PRIMARY) HYPERTENSION: ICD-10-CM

## 2025-01-08 DIAGNOSIS — E11.9 TYPE 2 DIABETES MELLITUS W/OUT COMPLICATIONS: ICD-10-CM

## 2025-01-08 PROCEDURE — G2211 COMPLEX E/M VISIT ADD ON: CPT

## 2025-01-08 PROCEDURE — 99214 OFFICE O/P EST MOD 30 MIN: CPT

## 2025-01-17 ENCOUNTER — LABORATORY RESULT (OUTPATIENT)
Age: 60
End: 2025-01-17

## 2025-01-23 ENCOUNTER — APPOINTMENT (OUTPATIENT)
Dept: ENDOCRINOLOGY | Facility: CLINIC | Age: 60
End: 2025-01-23
Payer: MEDICARE

## 2025-01-23 VITALS
BODY MASS INDEX: 45.1 KG/M2 | HEIGHT: 70 IN | HEART RATE: 81 BPM | OXYGEN SATURATION: 97 % | SYSTOLIC BLOOD PRESSURE: 114 MMHG | WEIGHT: 315 LBS | DIASTOLIC BLOOD PRESSURE: 70 MMHG

## 2025-01-23 DIAGNOSIS — E11.9 TYPE 2 DIABETES MELLITUS W/OUT COMPLICATIONS: ICD-10-CM

## 2025-01-23 DIAGNOSIS — E04.1 NONTOXIC SINGLE THYROID NODULE: ICD-10-CM

## 2025-01-23 DIAGNOSIS — E83.39 OTHER DISORDERS OF PHOSPHORUS METABOLISM: ICD-10-CM

## 2025-01-23 DIAGNOSIS — E66.01 MORBID (SEVERE) OBESITY DUE TO EXCESS CALORIES: ICD-10-CM

## 2025-01-23 DIAGNOSIS — E83.42 HYPOMAGNESEMIA: ICD-10-CM

## 2025-01-23 DIAGNOSIS — D64.9 ANEMIA, UNSPECIFIED: ICD-10-CM

## 2025-01-23 DIAGNOSIS — N18.1 TYPE 2 DIABETES MELLITUS WITH DIABETIC CHRONIC KIDNEY DISEASE: ICD-10-CM

## 2025-01-23 DIAGNOSIS — Z79.4 TYPE 2 DIABETES MELLITUS W/OUT COMPLICATIONS: ICD-10-CM

## 2025-01-23 DIAGNOSIS — E11.22 TYPE 2 DIABETES MELLITUS WITH DIABETIC CHRONIC KIDNEY DISEASE: ICD-10-CM

## 2025-01-23 DIAGNOSIS — E03.9 HYPOTHYROIDISM, UNSPECIFIED: ICD-10-CM

## 2025-01-23 PROCEDURE — 95251 CONT GLUC MNTR ANALYSIS I&R: CPT

## 2025-01-23 PROCEDURE — 99215 OFFICE O/P EST HI 40 MIN: CPT

## 2025-01-23 PROCEDURE — G2211 COMPLEX E/M VISIT ADD ON: CPT

## 2025-01-23 RX ORDER — TIRZEPATIDE 15 MG/.5ML
15 INJECTION, SOLUTION SUBCUTANEOUS
Qty: 3 | Refills: 2 | Status: ACTIVE | COMMUNITY
Start: 2025-01-23 | End: 1900-01-01

## 2025-01-24 RX ORDER — BLOOD-GLUCOSE METER
KIT MISCELLANEOUS
Qty: 2 | Refills: 3 | Status: ACTIVE | COMMUNITY
Start: 2025-01-23 | End: 1900-01-01

## 2025-01-24 RX ORDER — ALCOHOL ANTISEPTIC PADS
PADS, MEDICATED (EA) TOPICAL
Qty: 2 | Refills: 3 | Status: ACTIVE | COMMUNITY
Start: 2025-01-23 | End: 1900-01-01

## 2025-01-24 RX ORDER — BLOOD-GLUCOSE METER
W/DEVICE KIT MISCELLANEOUS
Qty: 1 | Refills: 0 | Status: ACTIVE | COMMUNITY
Start: 2025-01-23 | End: 1900-01-01

## 2025-01-30 ENCOUNTER — APPOINTMENT (OUTPATIENT)
Dept: CARDIOLOGY | Facility: CLINIC | Age: 60
End: 2025-01-30

## 2025-02-21 ENCOUNTER — APPOINTMENT (OUTPATIENT)
Dept: ORTHOPEDIC SURGERY | Facility: CLINIC | Age: 60
End: 2025-02-21
Payer: MEDICARE

## 2025-02-21 VITALS
OXYGEN SATURATION: 98 % | TEMPERATURE: 97.6 F | BODY MASS INDEX: 45.1 KG/M2 | HEART RATE: 75 BPM | RESPIRATION RATE: 16 BRPM | DIASTOLIC BLOOD PRESSURE: 65 MMHG | HEIGHT: 70 IN | SYSTOLIC BLOOD PRESSURE: 105 MMHG | WEIGHT: 315 LBS

## 2025-02-21 DIAGNOSIS — M48.061 SPINAL STENOSIS, LUMBAR REGION WITHOUT NEUROGENIC CLAUDICATION: ICD-10-CM

## 2025-02-21 PROCEDURE — 99204 OFFICE O/P NEW MOD 45 MIN: CPT

## 2025-02-21 PROCEDURE — 72110 X-RAY EXAM L-2 SPINE 4/>VWS: CPT

## 2025-02-21 PROCEDURE — G2211 COMPLEX E/M VISIT ADD ON: CPT

## 2025-02-21 PROCEDURE — 99214 OFFICE O/P EST MOD 30 MIN: CPT

## 2025-03-10 ENCOUNTER — RX RENEWAL (OUTPATIENT)
Age: 60
End: 2025-03-10

## 2025-03-11 ENCOUNTER — RX RENEWAL (OUTPATIENT)
Age: 60
End: 2025-03-11

## 2025-03-12 ENCOUNTER — RX RENEWAL (OUTPATIENT)
Age: 60
End: 2025-03-12

## 2025-03-17 ENCOUNTER — APPOINTMENT (OUTPATIENT)
Facility: CLINIC | Age: 60
End: 2025-03-17
Payer: MEDICARE

## 2025-03-17 VITALS — HEIGHT: 70 IN | WEIGHT: 315 LBS | BODY MASS INDEX: 45.1 KG/M2

## 2025-03-17 DIAGNOSIS — M43.16 SPONDYLOLISTHESIS, LUMBAR REGION: ICD-10-CM

## 2025-03-17 PROCEDURE — G2211 COMPLEX E/M VISIT ADD ON: CPT

## 2025-03-17 PROCEDURE — 99214 OFFICE O/P EST MOD 30 MIN: CPT

## 2025-04-22 ENCOUNTER — APPOINTMENT (OUTPATIENT)
Dept: CARDIOLOGY | Facility: CLINIC | Age: 60
End: 2025-04-22
Payer: MEDICARE

## 2025-04-22 ENCOUNTER — NON-APPOINTMENT (OUTPATIENT)
Age: 60
End: 2025-04-22

## 2025-04-22 VITALS
OXYGEN SATURATION: 97 % | DIASTOLIC BLOOD PRESSURE: 62 MMHG | BODY MASS INDEX: 45.1 KG/M2 | HEART RATE: 78 BPM | SYSTOLIC BLOOD PRESSURE: 110 MMHG | WEIGHT: 315 LBS | HEIGHT: 70 IN

## 2025-04-22 DIAGNOSIS — I10 ESSENTIAL (PRIMARY) HYPERTENSION: ICD-10-CM

## 2025-04-22 DIAGNOSIS — R60.0 LOCALIZED EDEMA: ICD-10-CM

## 2025-04-22 DIAGNOSIS — I48.20 CHRONIC ATRIAL FIBRILLATION, UNSP: ICD-10-CM

## 2025-04-22 DIAGNOSIS — R93.1 ABNORMAL FINDINGS ON DIAGNOSTIC IMAGING OF HEART AND CORONARY CIRCULATION: ICD-10-CM

## 2025-04-22 PROCEDURE — 93000 ELECTROCARDIOGRAM COMPLETE: CPT

## 2025-04-22 PROCEDURE — 99214 OFFICE O/P EST MOD 30 MIN: CPT

## 2025-04-22 RX ORDER — FUROSEMIDE 20 MG/1
20 TABLET ORAL DAILY
Qty: 30 | Refills: 1 | Status: ACTIVE | COMMUNITY
Start: 2025-04-22 | End: 1900-01-01

## 2025-04-23 ENCOUNTER — NON-APPOINTMENT (OUTPATIENT)
Age: 60
End: 2025-04-23

## 2025-04-25 ENCOUNTER — APPOINTMENT (OUTPATIENT)
Dept: GASTROENTEROLOGY | Facility: CLINIC | Age: 60
End: 2025-04-25
Payer: MEDICARE

## 2025-04-25 ENCOUNTER — APPOINTMENT (OUTPATIENT)
Dept: ENDOCRINOLOGY | Facility: CLINIC | Age: 60
End: 2025-04-25

## 2025-04-25 VITALS
BODY MASS INDEX: 45.1 KG/M2 | DIASTOLIC BLOOD PRESSURE: 66 MMHG | HEART RATE: 91 BPM | HEIGHT: 70 IN | OXYGEN SATURATION: 97 % | SYSTOLIC BLOOD PRESSURE: 112 MMHG | WEIGHT: 315 LBS

## 2025-04-25 VITALS
BODY MASS INDEX: 45.1 KG/M2 | WEIGHT: 315 LBS | RESPIRATION RATE: 97 BRPM | DIASTOLIC BLOOD PRESSURE: 60 MMHG | HEART RATE: 72 BPM | HEIGHT: 70 IN | SYSTOLIC BLOOD PRESSURE: 110 MMHG

## 2025-04-25 VITALS — DIASTOLIC BLOOD PRESSURE: 60 MMHG | SYSTOLIC BLOOD PRESSURE: 110 MMHG

## 2025-04-25 DIAGNOSIS — E83.42 HYPOMAGNESEMIA: ICD-10-CM

## 2025-04-25 DIAGNOSIS — D64.9 ANEMIA, UNSPECIFIED: ICD-10-CM

## 2025-04-25 DIAGNOSIS — E78.5 HYPERLIPIDEMIA, UNSPECIFIED: ICD-10-CM

## 2025-04-25 DIAGNOSIS — E11.9 TYPE 2 DIABETES MELLITUS W/OUT COMPLICATIONS: ICD-10-CM

## 2025-04-25 DIAGNOSIS — R15.2 FECAL URGENCY: ICD-10-CM

## 2025-04-25 DIAGNOSIS — E04.1 NONTOXIC SINGLE THYROID NODULE: ICD-10-CM

## 2025-04-25 DIAGNOSIS — E83.39 OTHER DISORDERS OF PHOSPHORUS METABOLISM: ICD-10-CM

## 2025-04-25 DIAGNOSIS — E11.22 TYPE 2 DIABETES MELLITUS WITH DIABETIC CHRONIC KIDNEY DISEASE: ICD-10-CM

## 2025-04-25 DIAGNOSIS — E66.01 MORBID (SEVERE) OBESITY DUE TO EXCESS CALORIES: ICD-10-CM

## 2025-04-25 DIAGNOSIS — E03.9 HYPOTHYROIDISM, UNSPECIFIED: ICD-10-CM

## 2025-04-25 DIAGNOSIS — Z86.0100 PERSONAL HISTORY OF COLON POLYPS, UNSPECIFIED: ICD-10-CM

## 2025-04-25 DIAGNOSIS — K21.9 GASTRO-ESOPHAGEAL REFLUX DISEASE W/OUT ESOPHAGITIS: ICD-10-CM

## 2025-04-25 DIAGNOSIS — N18.1 TYPE 2 DIABETES MELLITUS WITH DIABETIC CHRONIC KIDNEY DISEASE: ICD-10-CM

## 2025-04-25 DIAGNOSIS — Z79.4 TYPE 2 DIABETES MELLITUS W/OUT COMPLICATIONS: ICD-10-CM

## 2025-04-25 DIAGNOSIS — K76.0 FATTY (CHANGE OF) LIVER, NOT ELSEWHERE CLASSIFIED: ICD-10-CM

## 2025-04-25 LAB — GLUCOSE BLDC GLUCOMTR-MCNC: 124

## 2025-04-25 PROCEDURE — G2211 COMPLEX E/M VISIT ADD ON: CPT

## 2025-04-25 PROCEDURE — 95251 CONT GLUC MNTR ANALYSIS I&R: CPT

## 2025-04-25 PROCEDURE — 99214 OFFICE O/P EST MOD 30 MIN: CPT

## 2025-04-25 PROCEDURE — 99215 OFFICE O/P EST HI 40 MIN: CPT

## 2025-05-07 ENCOUNTER — NON-APPOINTMENT (OUTPATIENT)
Age: 60
End: 2025-05-07

## 2025-05-07 ENCOUNTER — APPOINTMENT (OUTPATIENT)
Dept: CARDIOLOGY | Facility: CLINIC | Age: 60
End: 2025-05-07
Payer: MEDICARE

## 2025-05-07 VITALS
BODY MASS INDEX: 47.78 KG/M2 | WEIGHT: 315 LBS | DIASTOLIC BLOOD PRESSURE: 70 MMHG | SYSTOLIC BLOOD PRESSURE: 112 MMHG | HEART RATE: 92 BPM | OXYGEN SATURATION: 98 %

## 2025-05-07 DIAGNOSIS — E11.22 TYPE 2 DIABETES MELLITUS WITH DIABETIC CHRONIC KIDNEY DISEASE: ICD-10-CM

## 2025-05-07 DIAGNOSIS — I87.2 VENOUS INSUFFICIENCY (CHRONIC) (PERIPHERAL): ICD-10-CM

## 2025-05-07 DIAGNOSIS — I77.810 THORACIC AORTIC ECTASIA: ICD-10-CM

## 2025-05-07 DIAGNOSIS — R06.09 OTHER FORMS OF DYSPNEA: ICD-10-CM

## 2025-05-07 DIAGNOSIS — E66.01 MORBID (SEVERE) OBESITY DUE TO EXCESS CALORIES: ICD-10-CM

## 2025-05-07 DIAGNOSIS — Z79.4 TYPE 2 DIABETES MELLITUS W/OUT COMPLICATIONS: ICD-10-CM

## 2025-05-07 DIAGNOSIS — E78.5 HYPERLIPIDEMIA, UNSPECIFIED: ICD-10-CM

## 2025-05-07 DIAGNOSIS — G47.33 OBSTRUCTIVE SLEEP APNEA (ADULT) (PEDIATRIC): ICD-10-CM

## 2025-05-07 DIAGNOSIS — E11.9 TYPE 2 DIABETES MELLITUS W/OUT COMPLICATIONS: ICD-10-CM

## 2025-05-07 DIAGNOSIS — N18.1 TYPE 2 DIABETES MELLITUS WITH DIABETIC CHRONIC KIDNEY DISEASE: ICD-10-CM

## 2025-05-07 PROCEDURE — 93000 ELECTROCARDIOGRAM COMPLETE: CPT

## 2025-05-07 PROCEDURE — 99215 OFFICE O/P EST HI 40 MIN: CPT

## 2025-05-07 PROCEDURE — 36415 COLL VENOUS BLD VENIPUNCTURE: CPT

## 2025-05-08 ENCOUNTER — RESULT REVIEW (OUTPATIENT)
Age: 60
End: 2025-05-08

## 2025-05-08 LAB
ANION GAP SERPL CALC-SCNC: 18 MMOL/L
BUN SERPL-MCNC: 16 MG/DL
CALCIUM SERPL-MCNC: 9 MG/DL
CHLORIDE SERPL-SCNC: 101 MMOL/L
CO2 SERPL-SCNC: 23 MMOL/L
CREAT SERPL-MCNC: 1.35 MG/DL
EGFRCR SERPLBLD CKD-EPI 2021: 59 ML/MIN/1.73M2
GLUCOSE SERPL-MCNC: 114 MG/DL
POTASSIUM SERPL-SCNC: 3.9 MMOL/L
SODIUM SERPL-SCNC: 142 MMOL/L

## 2025-05-14 ENCOUNTER — APPOINTMENT (OUTPATIENT)
Dept: CARDIOLOGY | Facility: CLINIC | Age: 60
End: 2025-05-14
Payer: MEDICARE

## 2025-05-14 VITALS
BODY MASS INDEX: 47.49 KG/M2 | WEIGHT: 315 LBS | OXYGEN SATURATION: 98 % | SYSTOLIC BLOOD PRESSURE: 104 MMHG | HEART RATE: 78 BPM | DIASTOLIC BLOOD PRESSURE: 64 MMHG

## 2025-05-14 DIAGNOSIS — R93.1 ABNORMAL FINDINGS ON DIAGNOSTIC IMAGING OF HEART AND CORONARY CIRCULATION: ICD-10-CM

## 2025-05-14 DIAGNOSIS — R60.0 LOCALIZED EDEMA: ICD-10-CM

## 2025-05-14 DIAGNOSIS — I10 ESSENTIAL (PRIMARY) HYPERTENSION: ICD-10-CM

## 2025-05-14 DIAGNOSIS — I48.20 CHRONIC ATRIAL FIBRILLATION, UNSP: ICD-10-CM

## 2025-05-14 PROCEDURE — 99214 OFFICE O/P EST MOD 30 MIN: CPT

## 2025-05-14 PROCEDURE — 36415 COLL VENOUS BLD VENIPUNCTURE: CPT

## 2025-05-14 RX ORDER — FUROSEMIDE 20 MG/1
20 TABLET ORAL
Qty: 30 | Refills: 5 | Status: ACTIVE | COMMUNITY
Start: 2025-05-08

## 2025-05-15 ENCOUNTER — TRANSCRIPTION ENCOUNTER (OUTPATIENT)
Age: 60
End: 2025-05-15

## 2025-05-15 LAB
ALBUMIN SERPL ELPH-MCNC: 4.4 G/DL
ALP BLD-CCNC: 81 U/L
ALT SERPL-CCNC: 25 U/L
ANION GAP SERPL CALC-SCNC: 16 MMOL/L
AST SERPL-CCNC: 25 U/L
BILIRUB SERPL-MCNC: 0.4 MG/DL
BUN SERPL-MCNC: 22 MG/DL
CALCIUM SERPL-MCNC: 10.1 MG/DL
CHLORIDE SERPL-SCNC: 98 MMOL/L
CO2 SERPL-SCNC: 26 MMOL/L
CREAT SERPL-MCNC: 1.4 MG/DL
EGFRCR SERPLBLD CKD-EPI 2021: 57 ML/MIN/1.73M2
GLUCOSE SERPL-MCNC: 149 MG/DL
POTASSIUM SERPL-SCNC: 3.7 MMOL/L
PROT SERPL-MCNC: 7.6 G/DL
SODIUM SERPL-SCNC: 141 MMOL/L

## 2025-05-19 ENCOUNTER — APPOINTMENT (OUTPATIENT)
Dept: VASCULAR SURGERY | Facility: CLINIC | Age: 60
End: 2025-05-19

## 2025-06-10 RX ORDER — PEN NEEDLE, DIABETIC 32GX 5/32"
32G X 4 MM NEEDLE, DISPOSABLE MISCELLANEOUS
Qty: 4 | Refills: 3 | Status: ACTIVE | COMMUNITY
Start: 2025-06-10 | End: 1900-01-01

## 2025-06-16 ENCOUNTER — APPOINTMENT (OUTPATIENT)
Dept: CARDIOLOGY | Facility: CLINIC | Age: 60
End: 2025-06-16
Payer: MEDICARE

## 2025-06-16 VITALS — OXYGEN SATURATION: 96 % | HEART RATE: 75 BPM | DIASTOLIC BLOOD PRESSURE: 60 MMHG | SYSTOLIC BLOOD PRESSURE: 104 MMHG

## 2025-06-16 PROCEDURE — 99214 OFFICE O/P EST MOD 30 MIN: CPT

## 2025-06-18 ENCOUNTER — APPOINTMENT (OUTPATIENT)
Dept: NEUROLOGY | Facility: CLINIC | Age: 60
End: 2025-06-18

## 2025-07-08 ENCOUNTER — APPOINTMENT (OUTPATIENT)
Dept: GASTROENTEROLOGY | Facility: HOSPITAL | Age: 60
End: 2025-07-08

## 2025-07-24 ENCOUNTER — APPOINTMENT (OUTPATIENT)
Dept: ENDOCRINOLOGY | Facility: CLINIC | Age: 60
End: 2025-07-24
Payer: MEDICARE

## 2025-07-24 VITALS
WEIGHT: 315 LBS | OXYGEN SATURATION: 98 % | HEART RATE: 90 BPM | HEIGHT: 70 IN | DIASTOLIC BLOOD PRESSURE: 80 MMHG | BODY MASS INDEX: 45.1 KG/M2 | SYSTOLIC BLOOD PRESSURE: 124 MMHG

## 2025-07-24 DIAGNOSIS — E03.9 HYPOTHYROIDISM, UNSPECIFIED: ICD-10-CM

## 2025-07-24 DIAGNOSIS — E78.5 HYPERLIPIDEMIA, UNSPECIFIED: ICD-10-CM

## 2025-07-24 DIAGNOSIS — E83.39 OTHER DISORDERS OF PHOSPHORUS METABOLISM: ICD-10-CM

## 2025-07-24 DIAGNOSIS — E11.22 TYPE 2 DIABETES MELLITUS WITH DIABETIC CHRONIC KIDNEY DISEASE: ICD-10-CM

## 2025-07-24 DIAGNOSIS — E83.42 HYPOMAGNESEMIA: ICD-10-CM

## 2025-07-24 DIAGNOSIS — E11.9 TYPE 2 DIABETES MELLITUS W/OUT COMPLICATIONS: ICD-10-CM

## 2025-07-24 DIAGNOSIS — D64.9 ANEMIA, UNSPECIFIED: ICD-10-CM

## 2025-07-24 DIAGNOSIS — E66.01 MORBID (SEVERE) OBESITY DUE TO EXCESS CALORIES: ICD-10-CM

## 2025-07-24 DIAGNOSIS — K76.0 FATTY (CHANGE OF) LIVER, NOT ELSEWHERE CLASSIFIED: ICD-10-CM

## 2025-07-24 DIAGNOSIS — Z79.4 TYPE 2 DIABETES MELLITUS W/OUT COMPLICATIONS: ICD-10-CM

## 2025-07-24 DIAGNOSIS — N18.1 TYPE 2 DIABETES MELLITUS WITH DIABETIC CHRONIC KIDNEY DISEASE: ICD-10-CM

## 2025-07-24 DIAGNOSIS — E04.1 NONTOXIC SINGLE THYROID NODULE: ICD-10-CM

## 2025-07-24 LAB — GLUCOSE BLDC GLUCOMTR-MCNC: 171

## 2025-07-24 PROCEDURE — G2211 COMPLEX E/M VISIT ADD ON: CPT

## 2025-07-24 PROCEDURE — 95251 CONT GLUC MNTR ANALYSIS I&R: CPT

## 2025-07-24 PROCEDURE — 99215 OFFICE O/P EST HI 40 MIN: CPT

## 2025-07-24 RX ORDER — SEMAGLUTIDE 0.68 MG/ML
2 INJECTION, SOLUTION SUBCUTANEOUS
Qty: 3 | Refills: 2 | Status: ACTIVE | COMMUNITY
Start: 2025-07-24 | End: 1900-01-01

## 2025-07-24 RX ORDER — MIRABEGRON 8 MG/8ML
8 GRANULE, FOR SUSPENSION, EXTENDED RELEASE ORAL
Refills: 0 | Status: ACTIVE | COMMUNITY

## 2025-08-07 ENCOUNTER — APPOINTMENT (OUTPATIENT)
Dept: PULMONOLOGY | Facility: CLINIC | Age: 60
End: 2025-08-07
Payer: MEDICARE

## 2025-08-07 VITALS
DIASTOLIC BLOOD PRESSURE: 70 MMHG | BODY MASS INDEX: 45.1 KG/M2 | HEART RATE: 93 BPM | HEIGHT: 70 IN | WEIGHT: 315 LBS | OXYGEN SATURATION: 97 % | SYSTOLIC BLOOD PRESSURE: 116 MMHG

## 2025-08-07 DIAGNOSIS — R06.09 OTHER FORMS OF DYSPNEA: ICD-10-CM

## 2025-08-07 PROCEDURE — 99214 OFFICE O/P EST MOD 30 MIN: CPT | Mod: 25

## 2025-08-07 PROCEDURE — 94727 GAS DIL/WSHOT DETER LNG VOL: CPT

## 2025-08-07 PROCEDURE — 94729 DIFFUSING CAPACITY: CPT

## 2025-08-07 PROCEDURE — 94060 EVALUATION OF WHEEZING: CPT

## 2025-08-08 ENCOUNTER — RESULT REVIEW (OUTPATIENT)
Age: 60
End: 2025-08-08

## 2025-08-20 ENCOUNTER — APPOINTMENT (OUTPATIENT)
Dept: ENDOCRINOLOGY | Facility: CLINIC | Age: 60
End: 2025-08-20
Payer: MEDICARE

## 2025-08-20 DIAGNOSIS — E11.9 TYPE 2 DIABETES MELLITUS W/OUT COMPLICATIONS: ICD-10-CM

## 2025-08-20 DIAGNOSIS — Z79.4 TYPE 2 DIABETES MELLITUS W/OUT COMPLICATIONS: ICD-10-CM

## 2025-08-20 PROCEDURE — 95251 CONT GLUC MNTR ANALYSIS I&R: CPT

## 2025-09-02 ENCOUNTER — RX RENEWAL (OUTPATIENT)
Age: 60
End: 2025-09-02